# Patient Record
Sex: FEMALE | Race: OTHER | NOT HISPANIC OR LATINO | URBAN - METROPOLITAN AREA
[De-identification: names, ages, dates, MRNs, and addresses within clinical notes are randomized per-mention and may not be internally consistent; named-entity substitution may affect disease eponyms.]

---

## 2018-03-01 ENCOUNTER — OUTPATIENT (OUTPATIENT)
Dept: OUTPATIENT SERVICES | Facility: HOSPITAL | Age: 53
LOS: 1 days | End: 2018-03-01
Payer: MEDICAID

## 2018-03-07 ENCOUNTER — INPATIENT (INPATIENT)
Facility: HOSPITAL | Age: 53
LOS: 0 days | Discharge: ANOTHER HOSPITAL | End: 2018-03-08
Attending: INTERNAL MEDICINE | Admitting: INTERNAL MEDICINE
Payer: MEDICAID

## 2018-03-07 VITALS — WEIGHT: 149.91 LBS | HEIGHT: 65 IN

## 2018-03-07 DIAGNOSIS — I10 ESSENTIAL (PRIMARY) HYPERTENSION: ICD-10-CM

## 2018-03-07 DIAGNOSIS — I21.4 NON-ST ELEVATION (NSTEMI) MYOCARDIAL INFARCTION: ICD-10-CM

## 2018-03-07 DIAGNOSIS — E11.9 TYPE 2 DIABETES MELLITUS WITHOUT COMPLICATIONS: ICD-10-CM

## 2018-03-07 LAB
ADD ON TEST-SPECIMEN IN LAB: SIGNIFICANT CHANGE UP
ALBUMIN SERPL ELPH-MCNC: 3.1 G/DL — LOW (ref 3.3–5)
ALP SERPL-CCNC: 57 U/L — SIGNIFICANT CHANGE UP (ref 40–120)
ALT FLD-CCNC: 57 U/L — SIGNIFICANT CHANGE UP (ref 12–78)
ANION GAP SERPL CALC-SCNC: 6 MMOL/L — SIGNIFICANT CHANGE UP (ref 5–17)
APTT BLD: 33 SEC — SIGNIFICANT CHANGE UP (ref 27.5–37.4)
AST SERPL-CCNC: 53 U/L — HIGH (ref 15–37)
BASOPHILS NFR BLD AUTO: 0 % — SIGNIFICANT CHANGE UP (ref 0–2)
BILIRUB SERPL-MCNC: 0.3 MG/DL — SIGNIFICANT CHANGE UP (ref 0.2–1.2)
BUN SERPL-MCNC: 16 MG/DL — SIGNIFICANT CHANGE UP (ref 7–23)
CALCIUM SERPL-MCNC: 8.7 MG/DL — SIGNIFICANT CHANGE UP (ref 8.5–10.1)
CHLORIDE SERPL-SCNC: 104 MMOL/L — SIGNIFICANT CHANGE UP (ref 96–108)
CK SERPL-CCNC: 103 U/L — SIGNIFICANT CHANGE UP (ref 26–192)
CO2 SERPL-SCNC: 30 MMOL/L — SIGNIFICANT CHANGE UP (ref 22–31)
CREAT SERPL-MCNC: 0.73 MG/DL — SIGNIFICANT CHANGE UP (ref 0.5–1.3)
D DIMER BLD IA.RAPID-MCNC: <150 NG/ML DDU — SIGNIFICANT CHANGE UP
EOSINOPHIL NFR BLD AUTO: 3 % — SIGNIFICANT CHANGE UP (ref 0–6)
GLUCOSE SERPL-MCNC: 174 MG/DL — HIGH (ref 70–99)
HCT VFR BLD CALC: 35.6 % — SIGNIFICANT CHANGE UP (ref 34.5–45)
HGB BLD-MCNC: 11.3 G/DL — LOW (ref 11.5–15.5)
INR BLD: 0.99 RATIO — SIGNIFICANT CHANGE UP (ref 0.88–1.16)
LYMPHOCYTES # BLD AUTO: 4.98 K/UL — HIGH (ref 1–3.3)
LYMPHOCYTES # BLD AUTO: 45 % — HIGH (ref 13–44)
MANUAL SMEAR VERIFICATION: YES — SIGNIFICANT CHANGE UP
MCHC RBC-ENTMCNC: 28.2 PG — SIGNIFICANT CHANGE UP (ref 27–34)
MCHC RBC-ENTMCNC: 31.7 GM/DL — LOW (ref 32–36)
MCV RBC AUTO: 88.8 FL — SIGNIFICANT CHANGE UP (ref 80–100)
MONOCYTES NFR BLD AUTO: 3 % — SIGNIFICANT CHANGE UP (ref 2–14)
NEUTROPHILS # BLD AUTO: 4.76 K/UL — SIGNIFICANT CHANGE UP (ref 1.8–7.4)
NEUTROPHILS NFR BLD AUTO: 43 % — SIGNIFICANT CHANGE UP (ref 43–77)
NRBC # BLD: 0 /100 — SIGNIFICANT CHANGE UP (ref 0–0)
NRBC # BLD: SIGNIFICANT CHANGE UP /100 WBCS (ref 0–0)
NT-PROBNP SERPL-SCNC: 640 PG/ML — HIGH (ref 0–125)
PLAT MORPH BLD: NORMAL — SIGNIFICANT CHANGE UP
PLATELET # BLD AUTO: 240 K/UL — SIGNIFICANT CHANGE UP (ref 150–400)
POTASSIUM SERPL-MCNC: 4.1 MMOL/L — SIGNIFICANT CHANGE UP (ref 3.5–5.3)
POTASSIUM SERPL-SCNC: 4.1 MMOL/L — SIGNIFICANT CHANGE UP (ref 3.5–5.3)
PROT SERPL-MCNC: 7 GM/DL — SIGNIFICANT CHANGE UP (ref 6–8.3)
PROTHROM AB SERPL-ACNC: 10.7 SEC — SIGNIFICANT CHANGE UP (ref 9.8–12.7)
RBC # BLD: 4.01 M/UL — SIGNIFICANT CHANGE UP (ref 3.8–5.2)
RBC # FLD: 13.1 % — SIGNIFICANT CHANGE UP (ref 10.3–14.5)
RBC BLD AUTO: NORMAL — SIGNIFICANT CHANGE UP
SODIUM SERPL-SCNC: 140 MMOL/L — SIGNIFICANT CHANGE UP (ref 135–145)
TROPONIN I SERPL-MCNC: 0.45 NG/ML — HIGH (ref 0.01–0.04)
TSH SERPL-MCNC: 4.82 UIU/ML — HIGH (ref 0.36–3.74)
VARIANT LYMPHS # BLD: 6 % — SIGNIFICANT CHANGE UP (ref 0–6)
WBC # BLD: 11.06 K/UL — HIGH (ref 3.8–10.5)
WBC # FLD AUTO: 11.06 K/UL — HIGH (ref 3.8–10.5)

## 2018-03-07 PROCEDURE — 99223 1ST HOSP IP/OBS HIGH 75: CPT

## 2018-03-07 PROCEDURE — 93010 ELECTROCARDIOGRAM REPORT: CPT

## 2018-03-07 PROCEDURE — 99291 CRITICAL CARE FIRST HOUR: CPT

## 2018-03-07 PROCEDURE — 71046 X-RAY EXAM CHEST 2 VIEWS: CPT | Mod: 26

## 2018-03-07 RX ORDER — DEXTROSE 50 % IN WATER 50 %
12.5 SYRINGE (ML) INTRAVENOUS ONCE
Qty: 0 | Refills: 0 | Status: DISCONTINUED | OUTPATIENT
Start: 2018-03-07 | End: 2018-03-08

## 2018-03-07 RX ORDER — INSULIN LISPRO 100/ML
VIAL (ML) SUBCUTANEOUS EVERY 6 HOURS
Qty: 0 | Refills: 0 | Status: DISCONTINUED | OUTPATIENT
Start: 2018-03-07 | End: 2018-03-08

## 2018-03-07 RX ORDER — HEPARIN SODIUM 5000 [USP'U]/ML
4400 INJECTION INTRAVENOUS; SUBCUTANEOUS ONCE
Qty: 0 | Refills: 0 | Status: COMPLETED | OUTPATIENT
Start: 2018-03-07 | End: 2018-03-07

## 2018-03-07 RX ORDER — ASPIRIN/CALCIUM CARB/MAGNESIUM 324 MG
325 TABLET ORAL ONCE
Qty: 0 | Refills: 0 | Status: COMPLETED | OUTPATIENT
Start: 2018-03-07 | End: 2018-03-07

## 2018-03-07 RX ORDER — DEXTROSE 50 % IN WATER 50 %
1 SYRINGE (ML) INTRAVENOUS ONCE
Qty: 0 | Refills: 0 | Status: DISCONTINUED | OUTPATIENT
Start: 2018-03-07 | End: 2018-03-08

## 2018-03-07 RX ORDER — ONDANSETRON 8 MG/1
4 TABLET, FILM COATED ORAL EVERY 6 HOURS
Qty: 0 | Refills: 0 | Status: DISCONTINUED | OUTPATIENT
Start: 2018-03-07 | End: 2018-03-08

## 2018-03-07 RX ORDER — ASPIRIN/CALCIUM CARB/MAGNESIUM 324 MG
162 TABLET ORAL DAILY
Qty: 0 | Refills: 0 | Status: DISCONTINUED | OUTPATIENT
Start: 2018-03-07 | End: 2018-03-08

## 2018-03-07 RX ORDER — METOPROLOL TARTRATE 50 MG
5 TABLET ORAL ONCE
Qty: 0 | Refills: 0 | Status: COMPLETED | OUTPATIENT
Start: 2018-03-07 | End: 2018-03-07

## 2018-03-07 RX ORDER — GLUCAGON INJECTION, SOLUTION 0.5 MG/.1ML
1 INJECTION, SOLUTION SUBCUTANEOUS ONCE
Qty: 0 | Refills: 0 | Status: DISCONTINUED | OUTPATIENT
Start: 2018-03-07 | End: 2018-03-08

## 2018-03-07 RX ORDER — SODIUM CHLORIDE 9 MG/ML
3 INJECTION INTRAMUSCULAR; INTRAVENOUS; SUBCUTANEOUS ONCE
Qty: 0 | Refills: 0 | Status: COMPLETED | OUTPATIENT
Start: 2018-03-07 | End: 2018-03-07

## 2018-03-07 RX ORDER — METOPROLOL TARTRATE 50 MG
12.5 TABLET ORAL
Qty: 0 | Refills: 0 | Status: DISCONTINUED | OUTPATIENT
Start: 2018-03-07 | End: 2018-03-08

## 2018-03-07 RX ORDER — LEVOTHYROXINE SODIUM 125 MCG
25 TABLET ORAL DAILY
Qty: 0 | Refills: 0 | Status: DISCONTINUED | OUTPATIENT
Start: 2018-03-07 | End: 2018-03-08

## 2018-03-07 RX ORDER — AMLODIPINE BESYLATE 2.5 MG/1
5 TABLET ORAL DAILY
Qty: 0 | Refills: 0 | Status: DISCONTINUED | OUTPATIENT
Start: 2018-03-07 | End: 2018-03-08

## 2018-03-07 RX ORDER — SODIUM CHLORIDE 9 MG/ML
1000 INJECTION, SOLUTION INTRAVENOUS
Qty: 0 | Refills: 0 | Status: DISCONTINUED | OUTPATIENT
Start: 2018-03-07 | End: 2018-03-08

## 2018-03-07 RX ORDER — DEXTROSE 50 % IN WATER 50 %
25 SYRINGE (ML) INTRAVENOUS ONCE
Qty: 0 | Refills: 0 | Status: DISCONTINUED | OUTPATIENT
Start: 2018-03-07 | End: 2018-03-08

## 2018-03-07 RX ORDER — CLOPIDOGREL BISULFATE 75 MG/1
300 TABLET, FILM COATED ORAL ONCE
Qty: 0 | Refills: 0 | Status: COMPLETED | OUTPATIENT
Start: 2018-03-07 | End: 2018-03-07

## 2018-03-07 RX ORDER — HEPARIN SODIUM 5000 [USP'U]/ML
INJECTION INTRAVENOUS; SUBCUTANEOUS
Qty: 25000 | Refills: 0 | Status: DISCONTINUED | OUTPATIENT
Start: 2018-03-07 | End: 2018-03-08

## 2018-03-07 RX ORDER — HEPARIN SODIUM 5000 [USP'U]/ML
4400 INJECTION INTRAVENOUS; SUBCUTANEOUS EVERY 6 HOURS
Qty: 0 | Refills: 0 | Status: DISCONTINUED | OUTPATIENT
Start: 2018-03-07 | End: 2018-03-08

## 2018-03-07 RX ORDER — LISINOPRIL 2.5 MG/1
5 TABLET ORAL DAILY
Qty: 0 | Refills: 0 | Status: COMPLETED | OUTPATIENT
Start: 2018-03-07 | End: 2018-03-07

## 2018-03-07 RX ORDER — CLOPIDOGREL BISULFATE 75 MG/1
75 TABLET, FILM COATED ORAL DAILY
Qty: 0 | Refills: 0 | Status: DISCONTINUED | OUTPATIENT
Start: 2018-03-08 | End: 2018-03-08

## 2018-03-07 RX ORDER — ATORVASTATIN CALCIUM 80 MG/1
40 TABLET, FILM COATED ORAL AT BEDTIME
Qty: 0 | Refills: 0 | Status: DISCONTINUED | OUTPATIENT
Start: 2018-03-07 | End: 2018-03-08

## 2018-03-07 RX ORDER — SODIUM CHLORIDE 9 MG/ML
1000 INJECTION INTRAMUSCULAR; INTRAVENOUS; SUBCUTANEOUS
Qty: 0 | Refills: 0 | Status: DISCONTINUED | OUTPATIENT
Start: 2018-03-07 | End: 2018-03-08

## 2018-03-07 RX ADMIN — Medication 5 MILLIGRAM(S): at 22:30

## 2018-03-07 RX ADMIN — SODIUM CHLORIDE 3 MILLILITER(S): 9 INJECTION INTRAMUSCULAR; INTRAVENOUS; SUBCUTANEOUS at 21:00

## 2018-03-07 RX ADMIN — CLOPIDOGREL BISULFATE 300 MILLIGRAM(S): 75 TABLET, FILM COATED ORAL at 22:50

## 2018-03-07 RX ADMIN — HEPARIN SODIUM 900 UNIT(S)/HR: 5000 INJECTION INTRAVENOUS; SUBCUTANEOUS at 23:08

## 2018-03-07 RX ADMIN — HEPARIN SODIUM 4400 UNIT(S): 5000 INJECTION INTRAVENOUS; SUBCUTANEOUS at 23:08

## 2018-03-07 NOTE — ED ADULT NURSE NOTE - OBJECTIVE STATEMENT
Pt presents to ER c/o midsternal CP and SOB. Pt reports onset of symptoms was a few days ago with sudden onset. Denies radiating pain. Equal b/l chest expansion, clear lung sounds b/l, normal breathing pattern with no difficulty. AO x 3 oriented to baseline.

## 2018-03-07 NOTE — H&P ADULT - ASSESSMENT
53 y.o. female with PMH HTN, hypothyroidism and DM2 presents with chest pain and shortness of breath for the past few days. Pt reports sudden CP, substernal, moderate nonradiating that is associated with shortness of breath. Pt denies prior episodes. Pt states symptoms worse with exertion and asympt at rest. Denies fever, chills, n/v, abd pain, diarrhea or dysuria. Denies hx of cardiac issues. Pt is noncompliant with her medications. Denies stressors recently.    #chest pain due to NSTEMI (type II MI) 53 y.o. female with PMH HTN, hypothyroidism and DM2 presents with chest pain and shortness of breath for the past few days. Pt reports sudden CP, substernal, moderate nonradiating that is associated with shortness of breath. Pt denies prior episodes. Pt states symptoms worse with exertion and asympt at rest. Denies fever, chills, n/v, abd pain, diarrhea or dysuria. Denies hx of cardiac issues. Pt is noncompliant with her medications. Denies stressors recently.    #chest pain due to NSTEMI (type II MI)  -admit to CCU  -serial cardiac enzymes and EKGs  -ASA, plavix, statin  -BB, ACEI  -heparin drip  -iv hydration  -echo  -NPO except meds for cardiac cath in AM  -cardio consult, Dr Galeano    #hypothyroidism  -cont synthroid    #HTN, uncontrolled  -as above    #DM2  -hold metformin  -fingerstick monitoring and ISS    #DVT ppx  -on heparin drip

## 2018-03-07 NOTE — CONSULT NOTE ADULT - SUBJECTIVE AND OBJECTIVE BOX
HPI:  53 y.o. female with PMH HTN, hypothyroidism and DM2 presents with chest pain and shortness of breath for the past few days. Pt reports sub sternal chest pressure, moderate intensity, non- radiating that is associated with shortness of breath occuring a rest  today which prompted her come to hospital.  She has also gotten similair symptoms with less intensity with exertion over the past few days. Denies palpitations, orthopnea, PND , or LE edema. Also denies fever, chills, n/v, abd pain, diarrhea or dysuria. When she presented she was markedly hypertensive and she received medication which has helped her BP.     PAST MEDICAL & SURGICAL HISTORY:  Hypothyroid  DM (diabetes mellitus)  HTN (hypertension)    SOCIAL HISTORY: Non-Smoker/Social ETOH/ No Ilicit Drug use.    FAMILY HISTORY:       Allergies    No Known Allergies    Intolerances        HOME MEDICATIONS:   Home Medications:  aspirin 81 mg oral tablet: 1 tab(s) orally once a day (07 Mar 2018 22:02)  levothyroxine 25 mcg (0.025 mg) oral tablet: 1 tab(s) orally once a day (07 Mar 2018 22:02)  metFORMIN 500 mg oral tablet, extended release: 1 tab(s) orally 2 times a day (07 Mar 2018 22:02)  Norvasc 10 mg oral tablet: 1 tab(s) orally once a day (07 Mar 2018 22:02)      HOSPITAL MEDICATIONS:   MEDICATIONS  (STANDING):  aspirin enteric coated 162 milliGRAM(s) Oral daily  atorvastatin 40 milliGRAM(s) Oral at bedtime  clopidogrel Tablet 75 milliGRAM(s) Oral daily  clopidogrel Tablet 300 milliGRAM(s) Oral once  heparin  Infusion.  Unit(s)/Hr (9 mL/Hr) IV Continuous <Continuous>  heparin  Injectable 4400 Unit(s) IV Push once  lisinopril 5 milliGRAM(s) Oral daily  metoprolol     tartrate 12.5 milliGRAM(s) Oral two times a day  sodium chloride 0.9%. 1000 milliLiter(s) (75 mL/Hr) IV Continuous <Continuous>    MEDICATIONS  (PRN):  heparin  Injectable 4400 Unit(s) IV Push every 6 hours PRN For aPTT less than 40  ondansetron Injectable 4 milliGRAM(s) IV Push every 6 hours PRN Nausea      REVIEW OF SYSTEMS: 13 systems were reviewed and all negative except for comments above.    Vital Signs Last 24 Hrs  T(C): 36.7 (07 Mar 2018 19:49), Max: 36.7 (07 Mar 2018 19:49)  T(F): 98.1 (07 Mar 2018 19:49), Max: 98.1 (07 Mar 2018 19:49)  HR: 86 (07 Mar 2018 19:49) (86 - 86)  BP: 193/101 (07 Mar 2018 19:49) (193/101 - 193/101)  BP(mean): --  RR: 17 (07 Mar 2018 19:49) (17 - 17)  SpO2: 98% (07 Mar 2018 19:49) (98% - 98%)Daily Height in cm: 165.1 (07 Mar 2018 19:40)    Daily I&O's Summary      PHYSICAL EXAM:    Constitutional: NAD, awake and alert, well-developed  HEENT: PERRLA, EOMI,  No oral cyanosis. Oropharynx Clean and Dry.  Neck:  supple,  No JVD, No Thyroid enlargement. No Carotid Bruits bilaterally.  Respiratory: Breath sounds are clear bilaterally, No wheezing, rales or rhonchi  Cardiovascular: NL S1 and S2, RRR, no Murmurs, gallops or rubs  Gastrointestinal: Bowel Sounds present, soft, NT, ND, No HSM.   Extremities: No peripheral edema. No clubbing or cyanosis.  Barbeau Test performed in R+L UE and Negative.  Vascular: 2+ peripheral pulses in LE   Neurological: A/O x 3, no focal motor or sensory deficits  Musculoskeletal: no calf tenderness or joint swelling.  Skin: No rashes or lessions.      LABS: All Labs Reviewed:                        11.3   11.06 )-----------( 240      ( 07 Mar 2018 20:53 )             35.6     07 Mar 2018 20:53    140    |  104    |  16     ----------------------------<  174    4.1     |  30     |  0.73     Ca    8.7        07 Mar 2018 20:53    TPro  7.0    /  Alb  3.1    /  TBili  0.3    /  DBili  x      /  AST  53     /  ALT  57     /  AlkPhos  57     07 Mar 2018 20:53    PT/INR - ( 07 Mar 2018 20:53 )   PT: 10.7 sec;   INR: 0.99 ratio         PTT - ( 07 Mar 2018 20:53 )  PTT:33.0 sec  CARDIAC MARKERS ( 07 Mar 2018 20:53 )  0.448 ng/mL / x     / 103 U/L / x     / x          03-07 @ 20:53  Pro Bnp 640        RADIOLOGY:    EKG:    ECHO:    CARDIAC CATHTERIZATION/STRESS TEST: HPI:  53 y.o. female with PMH HTN, hypothyroidism and DM2 presents with chest pain and shortness of breath for the past few days. Pt reports sub sternal chest pressure, moderate intensity, non- radiating that is associated with shortness of breath occuring a rest  today which prompted her come to hospital.  She has also gotten similair symptoms with less intensity with exertion over the past few days. Denies palpitations, orthopnea, PND , or LE edema. Also denies fever, chills, n/v, abd pain, diarrhea or dysuria. When she presented she was markedly hypertensive and she received medication which has helped her BP.     PAST MEDICAL & SURGICAL HISTORY:  Hypothyroid  DM (diabetes mellitus)  HTN (hypertension)    SOCIAL HISTORY: Non-Smoker/Social ETOH/ No Ilicit Drug use.    FAMILY HISTORY:       Allergies    No Known Allergies    Intolerances        HOME MEDICATIONS:   Home Medications:  aspirin 81 mg oral tablet: 1 tab(s) orally once a day (07 Mar 2018 22:02)  levothyroxine 25 mcg (0.025 mg) oral tablet: 1 tab(s) orally once a day (07 Mar 2018 22:02)  metFORMIN 500 mg oral tablet, extended release: 1 tab(s) orally 2 times a day (07 Mar 2018 22:02)  Norvasc 10 mg oral tablet: 1 tab(s) orally once a day (07 Mar 2018 22:02)      HOSPITAL MEDICATIONS:   MEDICATIONS  (STANDING):  aspirin enteric coated 162 milliGRAM(s) Oral daily  atorvastatin 40 milliGRAM(s) Oral at bedtime  clopidogrel Tablet 75 milliGRAM(s) Oral daily  clopidogrel Tablet 300 milliGRAM(s) Oral once  heparin  Infusion.  Unit(s)/Hr (9 mL/Hr) IV Continuous <Continuous>  heparin  Injectable 4400 Unit(s) IV Push once  lisinopril 5 milliGRAM(s) Oral daily  metoprolol     tartrate 12.5 milliGRAM(s) Oral two times a day  sodium chloride 0.9%. 1000 milliLiter(s) (75 mL/Hr) IV Continuous <Continuous>    MEDICATIONS  (PRN):  heparin  Injectable 4400 Unit(s) IV Push every 6 hours PRN For aPTT less than 40  ondansetron Injectable 4 milliGRAM(s) IV Push every 6 hours PRN Nausea      REVIEW OF SYSTEMS: 13 systems were reviewed and all negative except for comments above.    Vital Signs Last 24 Hrs  T(C): 36.7 (07 Mar 2018 19:49), Max: 36.7 (07 Mar 2018 19:49)  T(F): 98.1 (07 Mar 2018 19:49), Max: 98.1 (07 Mar 2018 19:49)  HR: 86 (07 Mar 2018 19:49) (86 - 86)  BP: 193/101 (07 Mar 2018 19:49) (193/101 - 163/90)  BP(mean): --  RR: 17 (07 Mar 2018 19:49) (17 - 17)  SpO2: 98% (07 Mar 2018 19:49) (98% - 98%)Daily Height in cm: 165.1 (07 Mar 2018 19:40)    Daily I&O's Summary      PHYSICAL EXAM:    Constitutional: NAD, awake and alert, well-developed  HEENT: PERRLA, EOMI,  No oral cyanosis. Oropharynx Clean and Dry.  Neck:  supple,  No JVD, No Thyroid enlargement. No Carotid Bruits bilaterally.  Respiratory: Breath sounds are clear bilaterally, No wheezing, rales or rhonchi  Cardiovascular: NL S1 and S2, RRR, +s4. 1/6 ANGLE to LUSB  Gastrointestinal: Bowel Sounds present, soft, NT, ND.   Extremities: No peripheral edema. No clubbing or cyanosis.   Vascular: 2+ peripheral pulses in LE   Neurological: A/O x 3, no focal motor deficits  Musculoskeletal: no calf tenderness.  Skin: No rashes.      LABS: All Labs Reviewed:                        11.3   11.06 )-----------( 240      ( 07 Mar 2018 20:53 )             35.6     07 Mar 2018 20:53    140    |  104    |  16     ----------------------------<  174    4.1     |  30     |  0.73     Ca    8.7        07 Mar 2018 20:53    TPro  7.0    /  Alb  3.1    /  TBili  0.3    /  DBili  x      /  AST  53     /  ALT  57     /  AlkPhos  57     07 Mar 2018 20:53    PT/INR - ( 07 Mar 2018 20:53 )   PT: 10.7 sec;   INR: 0.99 ratio         PTT - ( 07 Mar 2018 20:53 )  PTT:33.0 sec  CARDIAC MARKERS ( 07 Mar 2018 20:53 )  0.448 ng/mL / x     / 103 U/L / x     / x          03-07 @ 20:53  Pro Bnp 640    RADIOLOGY:    EKG:  < from: 12 Lead ECG (03.07.18 @ 19:45) >  Normal sinus rhythmwith sinus arrhythmia  Possible Left atrial enlargement  ST & T wave abnormality, consider inferolateral ischemia  When compared with ECG of 25-SEP-2015 11:30,  T wave inversion now evident in Inferior leads  T wave inversion now evident in Anterolateral leads  < end of copied text >    ECHO:  2015: NL LV FX. Mild MR and Mild MVP. HPI:  53 y.o. female with PMH HTN, hypothyroidism and DM2 presents with chest pain and shortness of breath for the past few days. Pt reports sub sternal chest pressure, moderate intensity, non- radiating that is associated with shortness of breath occuring a rest  today which prompted her come to hospital.  She has also gotten similair symptoms with less intensity with exertion over the past few days. Denies palpitations, orthopnea, PND , or LE edema. Also denies fever, chills, n/v, abd pain, diarrhea or dysuria. When she presented she was markedly hypertensive and she received medication which has helped her BP.     PAST MEDICAL & SURGICAL HISTORY:  Hypothyroid  DM (diabetes mellitus)  HTN (hypertension)    SOCIAL HISTORY: Non-Smoker/Social ETOH/ No Ilicit Drug use.    FAMILY HISTORY: Mom and brothers have htn. Dad  of heart attack at age 59.    Allergies    No Known Allergies    Intolerances        HOME MEDICATIONS:   Home Medications:  aspirin 81 mg oral tablet: 1 tab(s) orally once a day (07 Mar 2018 22:02)  levothyroxine 25 mcg (0.025 mg) oral tablet: 1 tab(s) orally once a day (07 Mar 2018 22:02)  metFORMIN 500 mg oral tablet, extended release: 1 tab(s) orally 2 times a day (07 Mar 2018 22:02)  Norvasc 10 mg oral tablet: 1 tab(s) orally once a day (07 Mar 2018 22:02)      HOSPITAL MEDICATIONS:   MEDICATIONS  (STANDING):  aspirin enteric coated 162 milliGRAM(s) Oral daily  atorvastatin 40 milliGRAM(s) Oral at bedtime  clopidogrel Tablet 75 milliGRAM(s) Oral daily  clopidogrel Tablet 300 milliGRAM(s) Oral once  heparin  Infusion.  Unit(s)/Hr (9 mL/Hr) IV Continuous <Continuous>  heparin  Injectable 4400 Unit(s) IV Push once  lisinopril 5 milliGRAM(s) Oral daily  metoprolol     tartrate 12.5 milliGRAM(s) Oral two times a day  sodium chloride 0.9%. 1000 milliLiter(s) (75 mL/Hr) IV Continuous <Continuous>    MEDICATIONS  (PRN):  heparin  Injectable 4400 Unit(s) IV Push every 6 hours PRN For aPTT less than 40  ondansetron Injectable 4 milliGRAM(s) IV Push every 6 hours PRN Nausea      REVIEW OF SYSTEMS: 13 systems were reviewed and all negative except for comments above.    Vital Signs Last 24 Hrs  T(C): 36.7 (07 Mar 2018 19:49), Max: 36.7 (07 Mar 2018 19:49)  T(F): 98.1 (07 Mar 2018 19:49), Max: 98.1 (07 Mar 2018 19:49)  HR: 86 (07 Mar 2018 19:49) (86 - 86)  BP: 193/101 (07 Mar 2018 19:49) (193/101 - 163/90)  BP(mean): --  RR: 17 (07 Mar 2018 19:49) (17 - 17)  SpO2: 98% (07 Mar 2018 19:49) (98% - 98%)Daily Height in cm: 165.1 (07 Mar 2018 19:40)    Daily I&O's Summary      PHYSICAL EXAM:    Constitutional: NAD, awake and alert, well-developed  HEENT: PERRLA, EOMI,  No oral cyanosis. Oropharynx Clean and Dry.  Neck:  supple,  No JVD, No Thyroid enlargement. No Carotid Bruits bilaterally.  Respiratory: Breath sounds are clear bilaterally, No wheezing, rales or rhonchi  Cardiovascular: NL S1 and S2, RRR, +s4. 1/6 ANGLE to LUSB  Gastrointestinal: Bowel Sounds present, soft, NT, ND.   Extremities: No peripheral edema. No clubbing or cyanosis.   Vascular: 2+ peripheral pulses in LE   Neurological: A/O x 3, no focal motor deficits  Musculoskeletal: no calf tenderness.  Skin: No rashes.      LABS: All Labs Reviewed:                        11.3   11.06 )-----------( 240      ( 07 Mar 2018 20:53 )             35.6     07 Mar 2018 20:53    140    |  104    |  16     ----------------------------<  174    4.1     |  30     |  0.73     Ca    8.7        07 Mar 2018 20:53    TPro  7.0    /  Alb  3.1    /  TBili  0.3    /  DBili  x      /  AST  53     /  ALT  57     /  AlkPhos  57     07 Mar 2018 20:53    PT/INR - ( 07 Mar 2018 20:53 )   PT: 10.7 sec;   INR: 0.99 ratio         PTT - ( 07 Mar 2018 20:53 )  PTT:33.0 sec  CARDIAC MARKERS ( 07 Mar 2018 20:53 )  0.448 ng/mL / x     / 103 U/L / x     / x          - @ 20:53  Pro Bnp 640    RADIOLOGY:    EKG:  < from: 12 Lead ECG (18 @ 19:45) >  Normal sinus rhythmwith sinus arrhythmia  Possible Left atrial enlargement  ST & T wave abnormality, consider inferolateral ischemia  When compared with ECG of 25-SEP-2015 11:30,  T wave inversion now evident in Inferior leads  T wave inversion now evident in Anterolateral leads  < end of copied text >    ECHO:  2015: NL LV FX. Mild MR and Mild MVP.

## 2018-03-07 NOTE — ED ADULT TRIAGE NOTE - CHIEF COMPLAINT QUOTE
Patient presents to ED complaining of difficulty breathing for one week. Patient denies chest pain. Patient reports history of pneumonia.

## 2018-03-07 NOTE — H&P ADULT - HISTORY OF PRESENT ILLNESS
53 y.o. female with PMH HTN, hypothyroidism and DM2 presents with chest pain and shortness of breath for the past few days. Pt reports sudden CP, substernal, moderate nonradiating that is associated with shortness of breath. Pt denies prior episodes. Pt states symptoms worse with exertion and asympt at rest. Denies fever, chills, n/v, abd pain, diarrhea or dysuria. Denies hx of cardiac issues. Pt is noncompliant with her medications. 53 y.o. female with PMH HTN, hypothyroidism and DM2 presents with chest pain and shortness of breath for the past few days. Pt reports sudden CP, substernal, moderate nonradiating that is associated with shortness of breath. Pt denies prior episodes. Pt states symptoms worse with exertion and asympt at rest. Denies fever, chills, n/v, abd pain, diarrhea or dysuria. Denies hx of cardiac issues. Pt is noncompliant with her medications. Denies stressors recently.    PMH: as above  PSH: denies  Social Hx: denies tobacco, EtOH, drugs  Family Hx: Mother and Father with diabetes  ROS: per HPI

## 2018-03-07 NOTE — ED STATDOCS - PROGRESS NOTE DETAILS
Sharyn JUSTIN for ED attending, Dr. Love: 54 y/o female with a PMHx of DM on Metformin, hypothyroid on Synthroid presents to the ED c/o trouble breathing for a few days on minimal exertion. No h/o heart problems. On ASA. PMD: Anna Thacker. Will send pt to the main ED for further evaluation.

## 2018-03-07 NOTE — ED PROVIDER NOTE - PROGRESS NOTE DETAILS
Roma MEDINA: Case discussed with Dr. Galeano- cardiology on call- would like patient placed in CCU; heparing gtt/bolus; plavix 300mg and lopressor at this time. Dr. Howard aware. Roma MEDINA: (+) troponin- NSTEMI; cardiology paged; endorsed to Dr. Howard for admission.

## 2018-03-07 NOTE — H&P ADULT - NSHPPHYSICALEXAM_GEN_ALL_CORE
Vital Signs Last 24 Hrs  T(C): 36.7 (07 Mar 2018 19:49), Max: 36.7 (07 Mar 2018 19:49)  T(F): 98.1 (07 Mar 2018 19:49), Max: 98.1 (07 Mar 2018 19:49)  HR: 86 (07 Mar 2018 19:49) (86 - 86)  BP: 193/101 (07 Mar 2018 19:49) (193/101 - 193/101)  BP(mean): --  RR: 17 (07 Mar 2018 19:49) (17 - 17)  SpO2: 98% (07 Mar 2018 19:49) (98% - 98%)    GEN: appears comfortable  Neuro: AAOx3, nonfocal  HEENT: NC/AT, EOMI  Neck: no thyroidmegaly, no JVD  Cardiovascular: S1S2 present, regular rhythm, no murmur  Respiratory: breath sounds normal bilaterally, no wheezing, no rales, no rhonchi  Gastrointestinal: bowel sounds normal, soft, no abdominal tenderness  Musculoskeletal: no muscle tenderness  Extremities: No edema  Skin: No rash

## 2018-03-07 NOTE — ED PROVIDER NOTE - OBJECTIVE STATEMENT
54 y/o female with a PMHx of HTN on medication, DM on Metformin, hypothyroid on Synthroid presents to the ED c/o intermittent difficulty breathing, CP x4-5 days. Pt states she has difficulty breathing, SOB with walking a few steps. +mild cough. No fever, leg swelling, recent travel. No h/o heart problems. On ASA 81mg. Pt states she last saw PMD 6 months ago and missed her last 2 appointments. No ETOH use. Non-smoker. No illicit drug use. No family cardiac Hx. PMD: Dr. Anna Brock. 52 y/o female with a PMHx of HTN on medication, DM on Metformin, hypothyroidism on Synthroid presents to the ED c/o intermittent difficulty breathing, CP x4-5 days. Pt states she has difficulty breathing, SOB with walking a few steps. +mild cough. No fever, leg swelling, recent travel. No h/o heart problems. On ASA 81mg. Pt states she last saw PMD 6 months ago and missed her last 2 appointments. No ETOH use. Non-smoker. No illicit drug use. No family cardiac Hx. PMD: Dr. Anna Brock.

## 2018-03-07 NOTE — CONSULT NOTE ADULT - PROBLEM SELECTOR RECOMMENDATION 9
Crescendo angina with NSTEMI.  Aspirin and plavix started. heparin drip. trend CE and repeat ECG in AM. Cardiac cath in AM for further evaluation and treatment. Echocardiogram for assessment of LV FX.

## 2018-03-08 ENCOUNTER — TRANSCRIPTION ENCOUNTER (OUTPATIENT)
Age: 53
End: 2018-03-08

## 2018-03-08 ENCOUNTER — INPATIENT (INPATIENT)
Facility: HOSPITAL | Age: 53
LOS: 8 days | Discharge: HOME | DRG: 235 | End: 2018-03-17
Attending: STUDENT IN AN ORGANIZED HEALTH CARE EDUCATION/TRAINING PROGRAM | Admitting: THORACIC SURGERY (CARDIOTHORACIC VASCULAR SURGERY)
Payer: MEDICAID

## 2018-03-08 VITALS — HEART RATE: 97 BPM | RESPIRATION RATE: 22 BRPM | OXYGEN SATURATION: 97 %

## 2018-03-08 VITALS
OXYGEN SATURATION: 97 % | SYSTOLIC BLOOD PRESSURE: 160 MMHG | TEMPERATURE: 98 F | WEIGHT: 159.39 LBS | RESPIRATION RATE: 25 BRPM | HEIGHT: 64 IN | DIASTOLIC BLOOD PRESSURE: 86 MMHG | HEART RATE: 88 BPM

## 2018-03-08 DIAGNOSIS — I25.10 ATHEROSCLEROTIC HEART DISEASE OF NATIVE CORONARY ARTERY WITHOUT ANGINA PECTORIS: ICD-10-CM

## 2018-03-08 DIAGNOSIS — I25.110 ATHEROSCLEROTIC HEART DISEASE OF NATIVE CORONARY ARTERY WITH UNSTABLE ANGINA PECTORIS: ICD-10-CM

## 2018-03-08 LAB
ALBUMIN SERPL ELPH-MCNC: 3.5 G/DL — SIGNIFICANT CHANGE UP (ref 3.3–5)
ALP SERPL-CCNC: 54 U/L — SIGNIFICANT CHANGE UP (ref 40–120)
ALT FLD-CCNC: 47 U/L RC — HIGH (ref 10–45)
ANION GAP SERPL CALC-SCNC: 13 MMOL/L — SIGNIFICANT CHANGE UP (ref 5–17)
ANION GAP SERPL CALC-SCNC: 6 MMOL/L — SIGNIFICANT CHANGE UP (ref 5–17)
APPEARANCE UR: CLEAR — SIGNIFICANT CHANGE UP
APTT BLD: 39.5 SEC — HIGH (ref 27.5–37.4)
APTT BLD: 59.9 SEC — HIGH (ref 27.5–37.4)
AST SERPL-CCNC: 55 U/L — HIGH (ref 10–40)
BASOPHILS # BLD AUTO: 0.05 K/UL — SIGNIFICANT CHANGE UP (ref 0–0.2)
BASOPHILS NFR BLD AUTO: 0.4 % — SIGNIFICANT CHANGE UP (ref 0–2)
BILIRUB SERPL-MCNC: 0.4 MG/DL — SIGNIFICANT CHANGE UP (ref 0.2–1.2)
BILIRUB UR-MCNC: NEGATIVE — SIGNIFICANT CHANGE UP
BLD GP AB SCN SERPL QL: NEGATIVE — SIGNIFICANT CHANGE UP
BUN SERPL-MCNC: 12 MG/DL — SIGNIFICANT CHANGE UP (ref 7–23)
BUN SERPL-MCNC: 8 MG/DL — SIGNIFICANT CHANGE UP (ref 7–23)
CALCIUM SERPL-MCNC: 8.6 MG/DL — SIGNIFICANT CHANGE UP (ref 8.5–10.1)
CALCIUM SERPL-MCNC: 9 MG/DL — SIGNIFICANT CHANGE UP (ref 8.4–10.5)
CHLORIDE SERPL-SCNC: 102 MMOL/L — SIGNIFICANT CHANGE UP (ref 96–108)
CHLORIDE SERPL-SCNC: 104 MMOL/L — SIGNIFICANT CHANGE UP (ref 96–108)
CHOLEST SERPL-MCNC: 214 MG/DL — HIGH (ref 10–199)
CK SERPL-CCNC: 85 U/L — SIGNIFICANT CHANGE UP (ref 26–192)
CO2 SERPL-SCNC: 26 MMOL/L — SIGNIFICANT CHANGE UP (ref 22–31)
CO2 SERPL-SCNC: 29 MMOL/L — SIGNIFICANT CHANGE UP (ref 22–31)
COLOR SPEC: YELLOW — SIGNIFICANT CHANGE UP
CREAT SERPL-MCNC: 0.6 MG/DL — SIGNIFICANT CHANGE UP (ref 0.5–1.3)
CREAT SERPL-MCNC: 0.66 MG/DL — SIGNIFICANT CHANGE UP (ref 0.5–1.3)
DIFF PNL FLD: ABNORMAL
EOSINOPHIL # BLD AUTO: 0.29 K/UL — SIGNIFICANT CHANGE UP (ref 0–0.5)
EOSINOPHIL NFR BLD AUTO: 2.5 % — SIGNIFICANT CHANGE UP (ref 0–6)
GLUCOSE BLDC GLUCOMTR-MCNC: 147 MG/DL — HIGH (ref 70–99)
GLUCOSE BLDC GLUCOMTR-MCNC: 154 MG/DL — HIGH (ref 70–99)
GLUCOSE BLDC GLUCOMTR-MCNC: 175 MG/DL — HIGH (ref 70–99)
GLUCOSE BLDC GLUCOMTR-MCNC: 220 MG/DL — HIGH (ref 70–99)
GLUCOSE SERPL-MCNC: 140 MG/DL — HIGH (ref 70–99)
GLUCOSE SERPL-MCNC: 176 MG/DL — HIGH (ref 70–99)
GLUCOSE UR QL: 50 MG/DL
HBA1C BLD-MCNC: 7.5 % — HIGH (ref 4–5.6)
HCT VFR BLD CALC: 33.2 % — LOW (ref 34.5–45)
HCT VFR BLD CALC: 34.2 % — LOW (ref 34.5–45)
HDLC SERPL-MCNC: 29 MG/DL — LOW (ref 40–125)
HGB BLD-MCNC: 10.5 G/DL — LOW (ref 11.5–15.5)
HGB BLD-MCNC: 12 G/DL — SIGNIFICANT CHANGE UP (ref 11.5–15.5)
IMM GRANULOCYTES NFR BLD AUTO: 0.2 % — SIGNIFICANT CHANGE UP (ref 0–1.5)
INR BLD: 1 RATIO — SIGNIFICANT CHANGE UP (ref 0.88–1.16)
KETONES UR-MCNC: NEGATIVE — SIGNIFICANT CHANGE UP
LEUKOCYTE ESTERASE UR-ACNC: NEGATIVE — SIGNIFICANT CHANGE UP
LIPID PNL WITH DIRECT LDL SERPL: 115 MG/DL — SIGNIFICANT CHANGE UP
LYMPHOCYTES # BLD AUTO: 55.1 % — HIGH (ref 13–44)
LYMPHOCYTES # BLD AUTO: 6.4 K/UL — HIGH (ref 1–3.3)
MAGNESIUM SERPL-MCNC: 1.9 MG/DL — SIGNIFICANT CHANGE UP (ref 1.6–2.6)
MCHC RBC-ENTMCNC: 28 PG — SIGNIFICANT CHANGE UP (ref 27–34)
MCHC RBC-ENTMCNC: 30.7 PG — SIGNIFICANT CHANGE UP (ref 27–34)
MCHC RBC-ENTMCNC: 31.6 GM/DL — LOW (ref 32–36)
MCHC RBC-ENTMCNC: 35 GM/DL — SIGNIFICANT CHANGE UP (ref 32–36)
MCV RBC AUTO: 87.7 FL — SIGNIFICANT CHANGE UP (ref 80–100)
MCV RBC AUTO: 88.5 FL — SIGNIFICANT CHANGE UP (ref 80–100)
MONOCYTES # BLD AUTO: 0.58 K/UL — SIGNIFICANT CHANGE UP (ref 0–0.9)
MONOCYTES NFR BLD AUTO: 5 % — SIGNIFICANT CHANGE UP (ref 2–14)
NEUTROPHILS # BLD AUTO: 4.27 K/UL — SIGNIFICANT CHANGE UP (ref 1.8–7.4)
NEUTROPHILS NFR BLD AUTO: 36.8 % — LOW (ref 43–77)
NITRITE UR-MCNC: NEGATIVE — SIGNIFICANT CHANGE UP
NRBC # BLD: 0 /100 WBCS — SIGNIFICANT CHANGE UP (ref 0–0)
NT-PROBNP SERPL-SCNC: 614 PG/ML — HIGH (ref 0–300)
PA ADP PRP-ACNC: 277 PRU — SIGNIFICANT CHANGE UP (ref 194–417)
PH UR: 6.5 — SIGNIFICANT CHANGE UP (ref 5–8)
PHOSPHATE SERPL-MCNC: 3.5 MG/DL — SIGNIFICANT CHANGE UP (ref 2.5–4.5)
PLATELET # BLD AUTO: 213 K/UL — SIGNIFICANT CHANGE UP (ref 150–400)
PLATELET # BLD AUTO: 233 K/UL — SIGNIFICANT CHANGE UP (ref 150–400)
POTASSIUM SERPL-MCNC: 3.8 MMOL/L — SIGNIFICANT CHANGE UP (ref 3.5–5.3)
POTASSIUM SERPL-MCNC: 3.8 MMOL/L — SIGNIFICANT CHANGE UP (ref 3.5–5.3)
POTASSIUM SERPL-SCNC: 3.8 MMOL/L — SIGNIFICANT CHANGE UP (ref 3.5–5.3)
POTASSIUM SERPL-SCNC: 3.8 MMOL/L — SIGNIFICANT CHANGE UP (ref 3.5–5.3)
PROT SERPL-MCNC: 7.2 G/DL — SIGNIFICANT CHANGE UP (ref 6–8.3)
PROT UR-MCNC: 150 MG/DL
PROTHROM AB SERPL-ACNC: 10.9 SEC — SIGNIFICANT CHANGE UP (ref 9.8–12.7)
RBC # BLD: 3.75 M/UL — LOW (ref 3.8–5.2)
RBC # BLD: 3.9 M/UL — SIGNIFICANT CHANGE UP (ref 3.8–5.2)
RBC # FLD: 12.6 % — SIGNIFICANT CHANGE UP (ref 10.3–14.5)
RBC # FLD: 13.2 % — SIGNIFICANT CHANGE UP (ref 10.3–14.5)
RH IG SCN BLD-IMP: POSITIVE — SIGNIFICANT CHANGE UP
SODIUM SERPL-SCNC: 139 MMOL/L — SIGNIFICANT CHANGE UP (ref 135–145)
SODIUM SERPL-SCNC: 140 MMOL/L — SIGNIFICANT CHANGE UP (ref 135–145)
SP GR SPEC: 1.02 — SIGNIFICANT CHANGE UP (ref 1.01–1.02)
TOTAL CHOLESTEROL/HDL RATIO MEASUREMENT: 7.4 RATIO — HIGH (ref 3.3–7.1)
TRIGL SERPL-MCNC: 351 MG/DL — HIGH (ref 10–149)
TROPONIN I SERPL-MCNC: 0.29 NG/ML — HIGH (ref 0.01–0.04)
TROPONIN I SERPL-MCNC: 0.56 NG/ML — HIGH (ref 0.01–0.04)
TROPONIN I SERPL-MCNC: 0.63 NG/ML — HIGH (ref 0.01–0.04)
TSH SERPL-MCNC: 4.52 UIU/ML — HIGH (ref 0.27–4.2)
UROBILINOGEN FLD QL: NEGATIVE — SIGNIFICANT CHANGE UP
WBC # BLD: 11.61 K/UL — HIGH (ref 3.8–10.5)
WBC # BLD: 12.2 K/UL — HIGH (ref 3.8–10.5)
WBC # FLD AUTO: 11.61 K/UL — HIGH (ref 3.8–10.5)
WBC # FLD AUTO: 12.2 K/UL — HIGH (ref 3.8–10.5)

## 2018-03-08 PROCEDURE — 93458 L HRT ARTERY/VENTRICLE ANGIO: CPT | Mod: 26

## 2018-03-08 PROCEDURE — 93306 TTE W/DOPPLER COMPLETE: CPT | Mod: 26

## 2018-03-08 PROCEDURE — 93010 ELECTROCARDIOGRAM REPORT: CPT | Mod: 76

## 2018-03-08 PROCEDURE — 93880 EXTRACRANIAL BILAT STUDY: CPT | Mod: 26

## 2018-03-08 PROCEDURE — 93010 ELECTROCARDIOGRAM REPORT: CPT

## 2018-03-08 PROCEDURE — 99233 SBSQ HOSP IP/OBS HIGH 50: CPT

## 2018-03-08 RX ORDER — INSULIN HUMAN 100 [IU]/ML
INJECTION, SOLUTION SUBCUTANEOUS
Qty: 0 | Refills: 0 | Status: DISCONTINUED | OUTPATIENT
Start: 2018-03-08 | End: 2018-03-08

## 2018-03-08 RX ORDER — CHLORHEXIDINE GLUCONATE 213 G/1000ML
15 SOLUTION TOPICAL
Qty: 0 | Refills: 0 | Status: COMPLETED | OUTPATIENT
Start: 2018-03-08 | End: 2018-03-10

## 2018-03-08 RX ORDER — METOPROLOL TARTRATE 50 MG
1 TABLET ORAL
Qty: 0 | Refills: 0 | COMMUNITY
Start: 2018-03-08

## 2018-03-08 RX ORDER — HEPARIN SODIUM 5000 [USP'U]/ML
900 INJECTION INTRAVENOUS; SUBCUTANEOUS
Qty: 25000 | Refills: 0 | Status: DISCONTINUED | OUTPATIENT
Start: 2018-03-08 | End: 2018-03-11

## 2018-03-08 RX ORDER — LABETALOL HCL 100 MG
20 TABLET ORAL ONCE
Qty: 0 | Refills: 0 | Status: COMPLETED | OUTPATIENT
Start: 2018-03-08 | End: 2018-03-08

## 2018-03-08 RX ORDER — CEFUROXIME AXETIL 250 MG
1500 TABLET ORAL ONCE
Qty: 0 | Refills: 0 | Status: DISCONTINUED | OUTPATIENT
Start: 2018-03-08 | End: 2018-03-10

## 2018-03-08 RX ORDER — SODIUM CHLORIDE 9 MG/ML
1000 INJECTION, SOLUTION INTRAVENOUS
Qty: 0 | Refills: 0 | Status: DISCONTINUED | OUTPATIENT
Start: 2018-03-08 | End: 2018-03-11

## 2018-03-08 RX ORDER — HEPARIN SODIUM 5000 [USP'U]/ML
4400 INJECTION INTRAVENOUS; SUBCUTANEOUS EVERY 6 HOURS
Qty: 0 | Refills: 0 | Status: DISCONTINUED | OUTPATIENT
Start: 2018-03-08 | End: 2018-03-08

## 2018-03-08 RX ORDER — ASPIRIN/CALCIUM CARB/MAGNESIUM 324 MG
81 TABLET ORAL DAILY
Qty: 0 | Refills: 0 | Status: DISCONTINUED | OUTPATIENT
Start: 2018-03-08 | End: 2018-03-11

## 2018-03-08 RX ORDER — HEPARIN SODIUM 5000 [USP'U]/ML
INJECTION INTRAVENOUS; SUBCUTANEOUS
Qty: 25000 | Refills: 0 | Status: DISCONTINUED | OUTPATIENT
Start: 2018-03-08 | End: 2018-03-08

## 2018-03-08 RX ORDER — ATORVASTATIN CALCIUM 80 MG/1
1 TABLET, FILM COATED ORAL
Qty: 0 | Refills: 0 | COMMUNITY
Start: 2018-03-08

## 2018-03-08 RX ORDER — LEVOTHYROXINE SODIUM 125 MCG
25 TABLET ORAL DAILY
Qty: 0 | Refills: 0 | Status: DISCONTINUED | OUTPATIENT
Start: 2018-03-08 | End: 2018-03-11

## 2018-03-08 RX ORDER — METOPROLOL TARTRATE 50 MG
25 TABLET ORAL
Qty: 0 | Refills: 0 | Status: DISCONTINUED | OUTPATIENT
Start: 2018-03-08 | End: 2018-03-08

## 2018-03-08 RX ORDER — METFORMIN HYDROCHLORIDE 850 MG/1
1 TABLET ORAL
Qty: 0 | Refills: 0 | COMMUNITY

## 2018-03-08 RX ORDER — AMLODIPINE BESYLATE 2.5 MG/1
10 TABLET ORAL DAILY
Qty: 0 | Refills: 0 | Status: DISCONTINUED | OUTPATIENT
Start: 2018-03-08 | End: 2018-03-11

## 2018-03-08 RX ORDER — METOPROLOL TARTRATE 50 MG
5 TABLET ORAL ONCE
Qty: 0 | Refills: 0 | Status: COMPLETED | OUTPATIENT
Start: 2018-03-08 | End: 2018-03-08

## 2018-03-08 RX ORDER — CHLORHEXIDINE GLUCONATE 213 G/1000ML
1 SOLUTION TOPICAL DAILY
Qty: 0 | Refills: 0 | Status: DISCONTINUED | OUTPATIENT
Start: 2018-03-08 | End: 2018-03-10

## 2018-03-08 RX ORDER — METOPROLOL TARTRATE 50 MG
25 TABLET ORAL EVERY 8 HOURS
Qty: 0 | Refills: 0 | Status: DISCONTINUED | OUTPATIENT
Start: 2018-03-08 | End: 2018-03-09

## 2018-03-08 RX ORDER — INSULIN HUMAN 100 [IU]/ML
3 INJECTION, SOLUTION SUBCUTANEOUS
Qty: 100 | Refills: 0 | Status: DISCONTINUED | OUTPATIENT
Start: 2018-03-08 | End: 2018-03-11

## 2018-03-08 RX ORDER — ATORVASTATIN CALCIUM 80 MG/1
40 TABLET, FILM COATED ORAL AT BEDTIME
Qty: 0 | Refills: 0 | Status: DISCONTINUED | OUTPATIENT
Start: 2018-03-08 | End: 2018-03-11

## 2018-03-08 RX ORDER — ASPIRIN/CALCIUM CARB/MAGNESIUM 324 MG
2 TABLET ORAL
Qty: 0 | Refills: 0 | COMMUNITY
Start: 2018-03-08

## 2018-03-08 RX ORDER — INSULIN HUMAN 100 [IU]/ML
8 INJECTION, SOLUTION SUBCUTANEOUS ONCE
Qty: 0 | Refills: 0 | Status: COMPLETED | OUTPATIENT
Start: 2018-03-08 | End: 2018-03-08

## 2018-03-08 RX ORDER — INSULIN LISPRO 100/ML
VIAL (ML) SUBCUTANEOUS
Qty: 0 | Refills: 0 | Status: DISCONTINUED | OUTPATIENT
Start: 2018-03-08 | End: 2018-03-08

## 2018-03-08 RX ORDER — CLOPIDOGREL BISULFATE 75 MG/1
1 TABLET, FILM COATED ORAL
Qty: 0 | Refills: 0 | COMMUNITY
Start: 2018-03-08

## 2018-03-08 RX ORDER — ASPIRIN/CALCIUM CARB/MAGNESIUM 324 MG
1 TABLET ORAL
Qty: 0 | Refills: 0 | COMMUNITY

## 2018-03-08 RX ADMIN — AMLODIPINE BESYLATE 10 MILLIGRAM(S): 2.5 TABLET ORAL at 21:06

## 2018-03-08 RX ADMIN — INSULIN HUMAN 2 UNIT(S)/HR: 100 INJECTION, SOLUTION SUBCUTANEOUS at 23:19

## 2018-03-08 RX ADMIN — CLOPIDOGREL BISULFATE 75 MILLIGRAM(S): 75 TABLET, FILM COATED ORAL at 07:24

## 2018-03-08 RX ADMIN — ATORVASTATIN CALCIUM 40 MILLIGRAM(S): 80 TABLET, FILM COATED ORAL at 21:56

## 2018-03-08 RX ADMIN — Medication 20 MILLIGRAM(S): at 10:10

## 2018-03-08 RX ADMIN — Medication 12.5 MILLIGRAM(S): at 05:19

## 2018-03-08 RX ADMIN — INSULIN HUMAN 8 UNIT(S): 100 INJECTION, SOLUTION SUBCUTANEOUS at 22:56

## 2018-03-08 RX ADMIN — AMLODIPINE BESYLATE 5 MILLIGRAM(S): 2.5 TABLET ORAL at 05:19

## 2018-03-08 RX ADMIN — Medication 25 MILLIGRAM(S): at 11:24

## 2018-03-08 RX ADMIN — SODIUM CHLORIDE 15 MILLILITER(S): 9 INJECTION, SOLUTION INTRAVENOUS at 23:19

## 2018-03-08 RX ADMIN — SODIUM CHLORIDE 75 MILLILITER(S): 9 INJECTION INTRAMUSCULAR; INTRAVENOUS; SUBCUTANEOUS at 01:49

## 2018-03-08 RX ADMIN — HEPARIN SODIUM 900 UNIT(S)/HR: 5000 INJECTION INTRAVENOUS; SUBCUTANEOUS at 15:06

## 2018-03-08 RX ADMIN — Medication 81 MILLIGRAM(S): at 21:06

## 2018-03-08 RX ADMIN — CHLORHEXIDINE GLUCONATE 15 MILLILITER(S): 213 SOLUTION TOPICAL at 22:55

## 2018-03-08 RX ADMIN — Medication 25 MILLIGRAM(S): at 21:06

## 2018-03-08 RX ADMIN — Medication 25 MILLIGRAM(S): at 21:56

## 2018-03-08 RX ADMIN — Medication 5 MILLIGRAM(S): at 21:56

## 2018-03-08 RX ADMIN — ATORVASTATIN CALCIUM 40 MILLIGRAM(S): 80 TABLET, FILM COATED ORAL at 00:46

## 2018-03-08 RX ADMIN — HEPARIN SODIUM 900 UNIT(S)/HR: 5000 INJECTION INTRAVENOUS; SUBCUTANEOUS at 06:42

## 2018-03-08 RX ADMIN — Medication 25 MICROGRAM(S): at 05:19

## 2018-03-08 RX ADMIN — Medication 25 MICROGRAM(S): at 21:06

## 2018-03-08 RX ADMIN — Medication 162 MILLIGRAM(S): at 07:24

## 2018-03-08 RX ADMIN — LISINOPRIL 5 MILLIGRAM(S): 2.5 TABLET ORAL at 00:46

## 2018-03-08 RX ADMIN — Medication 1: at 06:46

## 2018-03-08 NOTE — PROGRESS NOTE ADULT - PROBLEM SELECTOR PLAN 1
Cath shows 3 vessel CAD and mild impaired LV FX.  Will need CABG.  Txf to [facility] for Dr. Grant.  Restart heparin 6 hrs post sheath removal. Increase metoprolol. follow up echo results.

## 2018-03-08 NOTE — DISCHARGE NOTE ADULT - CARE PLAN
Principal Discharge DX:	NSTEMI (non-ST elevated myocardial infarction)  Goal:	transfer to [facility]  Assessment and plan of treatment:	transfer to [facility] for CABG  resume heparin 6hr post sheath removal  Secondary Diagnosis:	DM (diabetes mellitus)  Goal:	diabetic control  Assessment and plan of treatment:	insulin sliding scale  Secondary Diagnosis:	Hypothyroid

## 2018-03-08 NOTE — H&P ADULT - NSHPLABSRESULTS_GEN_ALL_CORE
10.5   11.61 )-----------( 213      ( 08 Mar 2018 05:01 )               33.2   03-08    139  |  104  |  12  ----------------------------<  140<H>  3.8   |  29  |  0.66    Ca    8.6      08 Mar 2018 05:01  Phos  3.5     03-08  Mg     1.9     03-08    TPro  7.0  /  Alb  3.1<L>  /  TBili  0.3  /  DBili  x   /  AST  53<H>  /  ALT  57  /  AlkPhos  57  03-07

## 2018-03-08 NOTE — DISCHARGE NOTE ADULT - HOSPITAL COURSE
53y female w/      *NSTEMI   s/p cath this morning showing left main and 3 vessel disease  - Cardio f/u appreciated- transfer to Dr. Rudy Hazel  - restart heparin 6hr post sheath removal  - echo pending  - -ASA, plavix, statin, BB

## 2018-03-08 NOTE — DISCHARGE NOTE ADULT - PLAN OF CARE
transfer to [facility] transfer to [facility] for CABG  resume heparin 6hr post sheath removal diabetic control insulin sliding scale

## 2018-03-08 NOTE — PROGRESS NOTE ADULT - SUBJECTIVE AND OBJECTIVE BOX
HPI:  53 y.o. female with PMH HTN, hypothyroidism and DM2 presents with chest pain and shortness of breath for the past few days. Pt reports sub sternal chest pressure, moderate intensity, non- radiating that is associated with shortness of breath occuring a rest  today which prompted her come to hospital.  She has also gotten similair symptoms with less intensity with exertion over the past few days. Denies palpitations, orthopnea, PND , or LE edema. Also denies fever, chills, n/v, abd pain, diarrhea or dysuria. When she presented she was markedly hypertensive and she received medication which has helped her BP.     3/8/18: s/p CC.  3 vessel CAD and mildly impaired LV FX.  Echo to be done this am and pending.    PAST MEDICAL & SURGICAL HISTORY:  Hypothyroid  DM (diabetes mellitus)  HTN (hypertension)    SOCIAL HISTORY: Non-Smoker/Social ETOH/ No Ilicit Drug use.    FAMILY HISTORY: Mom and brothers have htn. Dad  of heart attack at age 59.    Allergies    No Known Allergies    Intolerances    HOME MEDICATIONS:   Home Medications:  aspirin 81 mg oral tablet: 1 tab(s) orally once a day (07 Mar 2018 22:02)  levothyroxine 25 mcg (0.025 mg) oral tablet: 1 tab(s) orally once a day (07 Mar 2018 22:02)  metFORMIN 500 mg oral tablet, extended release: 1 tab(s) orally 2 times a day (07 Mar 2018 22:02)  Norvasc 10 mg oral tablet: 1 tab(s) orally once a day (07 Mar 2018 22:02)      MEDICATIONS  (STANDING):  amLODIPine   Tablet 5 milliGRAM(s) Oral daily  aspirin enteric coated 162 milliGRAM(s) Oral daily  atorvastatin 40 milliGRAM(s) Oral at bedtime  clopidogrel Tablet 75 milliGRAM(s) Oral daily  dextrose 5%. 1000 milliLiter(s) (50 mL/Hr) IV Continuous <Continuous>  dextrose 50% Injectable 12.5 Gram(s) IV Push once  dextrose 50% Injectable 25 Gram(s) IV Push once  dextrose 50% Injectable 25 Gram(s) IV Push once  insulin lispro (HumaLOG) corrective regimen sliding scale   SubCutaneous every 6 hours  levothyroxine 25 MICROGram(s) Oral daily  metoprolol     tartrate 12.5 milliGRAM(s) Oral two times a day  sodium chloride 0.9%. 1000 milliLiter(s) (75 mL/Hr) IV Continuous <Continuous>    MEDICATIONS  (PRN):  dextrose Gel 1 Dose(s) Oral once PRN Blood Glucose LESS THAN 70 milliGRAM(s)/deciliter  glucagon  Injectable 1 milliGRAM(s) IntraMuscular once PRN Glucose LESS THAN 70 milligrams/deciliter  ondansetron Injectable 4 milliGRAM(s) IV Push every 6 hours PRN Nausea    Vital Signs Last 24 Hrs  T(C): 36.6 (08 Mar 2018 07:50), Max: 36.9 (08 Mar 2018 05:00)  T(F): 97.8 (08 Mar 2018 07:50), Max: 98.4 (08 Mar 2018 05:00)  HR: 83 (08 Mar 2018 07:50) (71 - 88)  BP: 177/95 (08 Mar 2018 07:50) (144/75 - 193/101)  BP(mean): 101 (08 Mar 2018 06:01) (101 - 115)  RR: 16 (08 Mar 2018 07:50) (16 - 26)  SpO2: 99% (08 Mar 2018 07:50) (93% - 99%)    I&O's Detail    07 Mar 2018 07:01  -  08 Mar 2018 07:00  --------------------------------------------------------  IN:    heparin  Infusion.: 66.4 mL    sodium chloride 0.9%.: 368.2 mL  Total IN: 434.6 mL    OUT:  Total OUT: 0 mL    Total NET: 434.6 mL          Daily Height in cm: 165.1 (07 Mar 2018 19:40)    Daily Weight in k.2 (08 Mar 2018 01:55)    REVIEW OF SYSTEMS: 13 systems were reviewed and all negative except for comments above.    PHYSICAL EXAM:    Constitutional: NAD, awake and alert, well-developed  HEENT: PERRLA, EOMI,  No oral cyanosis. Oropharynx Clean and Dry.  Neck:  supple,  No JVD, No Thyroid enlargement. No Carotid Bruits bilaterally.  Respiratory: Breath sounds are clear bilaterally, No wheezing, rales or rhonchi  Cardiovascular: NL S1 and S2, RRR, +s4. 1/6 ANGLE to LUSB  Gastrointestinal: Bowel Sounds present, soft, NT, ND.   Extremities: No peripheral edema. No clubbing or cyanosis.   Vascular: 2+ peripheral pulses in LE   Neurological: A/O x 3, no focal motor deficits  Musculoskeletal: no calf tenderness.  Skin: No rashes.      LABS: All Labs Reviewed:                                   10.5   11.61 )-----------( 213      ( 08 Mar 2018 05:01 )             33.2     03-08    139  |  104  |  12  ----------------------------<  140<H>  3.8   |  29  |  0.66    Ca    8.6      08 Mar 2018 05:01  Phos  3.5     03-08  Mg     1.9     03-08    TPro  7.0  /  Alb  3.1<L>  /  TBili  0.3  /  DBili  x   /  AST  53<H>  /  ALT  57  /  AlkPhos  57  03-07    CARDIAC MARKERS ( 08 Mar 2018 05:01 )  0.559 ng/mL / x     / x     / x     / x      CARDIAC MARKERS ( 07 Mar 2018 23:36 )  0.626 ng/mL / x     / x     / x     / x      CARDIAC MARKERS ( 07 Mar 2018 20:53 )  0.448 ng/mL / x     / 103 U/L / x     / x          LIVER FUNCTIONS - ( 07 Mar 2018 20:53 )  Alb: 3.1 g/dL / Pro: 7.0 gm/dL / ALK PHOS: 57 U/L / ALT: 57 U/L / AST: 53 U/L / GGT: x           PT/INR - ( 07 Mar 2018 20:53 )   PT: 10.7 sec;   INR: 0.99 ratio         PTT - ( 08 Mar 2018 05:01 )  PTT:59.9 sec    RADIOLOGY:    EKG:  < from: 12 Lead ECG (18 @ 19:45) >  Normal sinus rhythmwith sinus arrhythmia  Possible Left atrial enlargement  ST & T wave abnormality, consider inferolateral ischemia  When compared with ECG of 25-SEP-2015 11:30,  T wave inversion now evident in Inferior leads  T wave inversion now evident in Anterolateral leads  < end of copied text >    ECHO:  2015: NL LV FX. Mild MR and Mild MVP.    Cardiac cath:  L main and 3 vessel CAD and Mild Impaired LV FX.

## 2018-03-08 NOTE — H&P ADULT - ASSESSMENT
This is a 53 y.o. female with PMH HTN, hypothyroidism and DM2 presented to [facility] 3/8/18  with c/o  with chest pain and shortness of breath for the past few days. Pt reports sudden CP, substernal, moderate non radiating that is associated with shortness of breath.  Reports unstable angina . Denies fever, chills, n/v, abd pain, diarrhea or dysuria. Denies hx of cardiac issues. Pt is noncompliant with her medications. Denies stressors recently.     Ruled in NSTEMI Underwent cardiac cath via right radial at [facility] found  to have Left main and 3 vessel CAD with Mildly impaired LV FX. Plavix loaded. Transferred to  [facility]  for CABG in am.  Heparin GTT -P2y12 stat and again in am scheduled for CABG 3/8/17

## 2018-03-08 NOTE — H&P ADULT - NSHPREVIEWOFSYSTEMS_GEN_ALL_CORE
GENERAL SYMPTOMS: No weakness, fevers or chills  ENT: No visual changes;  No vertigo or throat pain   SKIN/BREAST: Negative  NECK: No pain or stiffness  RESPIRATORY/THORAX:  PRADHAN No cough, wheezing, hemoptysis  CARDIOVASCULAR: No chest pain or palpitations  GASTROINTESTINAL: No abdominal or epigastric pain. No nausea, vomiting, or hematemesis; No diarrhea or constipation.   GENITOURINARY: No dysuria, frequency or hematuria  NEUROLOGICAL: No numbness or weakness  MUSCULOSKELETAL: equal strength throughout  SKIN: No itching, burning, rashes, or lesions   All other review of systems is negative unless indicated above.  HEMATOLOGY/LYMPHATICS: Negative  ENDOCRINE: Negative

## 2018-03-08 NOTE — PROGRESS NOTE ADULT - SUBJECTIVE AND OBJECTIVE BOX
Cardiac Surgery Pre-op Note:    CC: Patient is a 53y old  Female who presents with a chief complaint of I need heart surgery  Referring Physician:Dr Galeano                                                                                                           Surgeon:Dr Rudy brito: 3/9/18)CABG  No Known Allergies    HPI:  This is a a53 y.o. female with PMH HTN, hypothyroidism and DM2 presented to [facility] 3/8/18  with c/o  with chest pain and shortness of breath for the past few days. Pt reports sudden CP, substernal, moderate non radiating that is associated with shortness of breath.  Reports unstable angina . Denies fever, chills, n/v, abd pain, diarrhea or dysuria. Denies hx of cardiac issues. Pt is noncompliant with her medications. Denies stressors recently.     Ruled in NSTEMI Underwent cardiac cath via right radial at [facility] found  to have Left main and 3 vessel CAD with Mildly impaired LV FX. Plavix loaded.   Transferred to  [facility]  for CABG in am. (08 Mar 2018 17:32)      PAST MEDICAL & SURGICAL HISTORY:  Hypothyroid  DM (diabetes mellitus)  HTN (hypertension)  No significant past surgical history      MEDICATIONS  (STANDING):  amLODIPine   Tablet 10 milliGRAM(s) Oral daily  aspirin enteric coated 81 milliGRAM(s) Oral daily  atorvastatin 40 milliGRAM(s) Oral at bedtime  cefuroxime  IVPB 1500 milliGRAM(s) IV Intermittent once  chlorhexidine 0.12% Liquid 15 milliLiter(s) Swish and Spit two times a day  chlorhexidine 4% Liquid 1 Application(s) Topical daily  heparin  Infusion 900 Unit(s)/Hr (9 mL/Hr) IV Continuous <Continuous>  insulin lispro (HumaLOG) corrective regimen sliding scale   SubCutaneous Before meals and at bedtime  levothyroxine 25 MICROGram(s) Oral daily  metoprolol     tartrate 25 milliGRAM(s) Oral two times a day    MEDICATIONS  (PRN):        Labs:                        12.0   12.2  )-----------( 233      ( 08 Mar 2018 18:34 )             34.2     03-08    140  |  102  |  x   ----------------------------<  x   3.8   |  x   |  x     Ca    8.6      08 Mar 2018 05:01  Phos  3.5     03-08  Mg     1.9     03-08    TPro  7.0  /  Alb  3.1<L>  /  TBili  0.3  /  DBili  x   /  AST  53<H>  /  ALT  57  /  AlkPhos  57      PT/INR - ( 08 Mar 2018 18:34 )   PT: 10.9 sec;   INR: 1.00 ratio         PTT - ( 08 Mar 2018 18:34 )  PTT:39.5 sec    Blood Type:   HGB A1C: Hemoglobin A1C, Whole Blood: 7.5 % ( @ 05:01)    Prealbumin:   Pro-BNP: Serum Pro-Brain Natriuretic Peptide: 640 pg/mL ( @ 20:53)    Thyroid Panel:  @ 20:53/4.820  --/--/--    MRSA:  / MSSA:   Urinalysis Basic - ( 08 Mar 2018 18:34 )    Color: Yellow / Appearance: Clear / S.018 / pH: x  Gluc: x / Ketone: Negative  / Bili: Negative / Urobili: Negative   Blood: x / Protein: 150 mg/dL / Nitrite: Negative   Leuk Esterase: Negative / RBC: 25-50 /HPF / WBC 2-5 /HPF   Sq Epi: x / Non Sq Epi: Occasional /HPF / Bacteria: x        CXR: < from: Xray Chest 2 Views PA/Lat (18 @ 21:12) >  e lungs are clear.  Heart: The heart is normal in size.  Mediastinum: The mediastinum is within normal limits.    IMPRESSION:    Clear lungs.  EKG:< from: 12 Lead ECG (18 @ 06:46) >  ssible Left atrial enlargement  ST & T wave abnormality, consider inferolateral ischemia  Abnormal ECG  When compared with ECG of 07-MAR-2018 19:45,  No significant change was found  Carotid Duplex:      PFT's:pending    Echocardiogram :pending     Cardiac catheterization:< from: Cardiac Cath Lab - Adult (18 @ 10:42) >   Left main and 3 vessel CAd with Mildly impaired LV FX.     Recommendations     Urgent CABG is recommended.    Vein Mapping:    Gen:  WN/WD NAD  Neuro:  AAOx3, nonfocal  Pulm:  CTA B/L  CV: RRR, S1S2  Abd:  Soft, NT, ND +BS  Ext: No edema, + peripheral pulses no varicosities       Pt has AICD/PPM [ ] Yes  [x ] No             Brand Name:  Pre-op Beta Blocker ordered within 24 hrs of surgery (CABG ONLY)?  [ x] Yes  [ ] No  If not, Why?  Type & Cross  [x ] Yes  [ ] No  NPO after Midnight [ x] Yes  [ ] No  Pre-op ABX ordered, to be taped on chart:  [ x] Yes  [ ] No     Hibiclens/Peridex ordered [x ] Yes  [ ] No  Intraop on Hold: PRBCs, CXR, MADDIE x ]   Consent obtained  [x ] Yes  [ ] No

## 2018-03-08 NOTE — DISCHARGE NOTE ADULT - MEDICATION SUMMARY - MEDICATIONS TO TAKE
I will START or STAY ON the medications listed below when I get home from the hospital:    aspirin 81 mg oral delayed release tablet  -- 2 tab(s) by mouth once a day  -- Indication: For CAD    atorvastatin 40 mg oral tablet  -- 1 tab(s) by mouth once a day (at bedtime)  -- Indication: For CAD    clopidogrel 75 mg oral tablet  -- 1 tab(s) by mouth once a day  -- Indication: For CAD    metoprolol tartrate 25 mg oral tablet  -- 1 tab(s) by mouth 2 times a day  -- Indication: For CAD    Norvasc 10 mg oral tablet  -- 1 tab(s) by mouth once a day  -- Indication: For HTN (hypertension)    levothyroxine 25 mcg (0.025 mg) oral tablet  -- 1 tab(s) by mouth once a day  -- Indication: For Hypothyroid

## 2018-03-08 NOTE — DISCHARGE NOTE ADULT - MEDICATION SUMMARY - MEDICATIONS TO STOP TAKING
I will STOP taking the medications listed below when I get home from the hospital:    metFORMIN 500 mg oral tablet, extended release  -- 1 tab(s) by mouth 2 times a day

## 2018-03-08 NOTE — DISCHARGE NOTE ADULT - CARE PROVIDER_API CALL
Artemio Grant), Surgery; Thoracic and Cardiac Surgery  [address] [place] [address]  Phone: [phone]  Fax: [phone]

## 2018-03-08 NOTE — H&P ADULT - HISTORY OF PRESENT ILLNESS
This is a a53 y.o. female with PMH HTN, hypothyroidism and DM2 presented to [facility] 3/8/18  with c/o  with chest pain and shortness of breath for the past few days. Pt reports sudden CP, substernal, moderate non radiating that is associated with shortness of breath.  Reports unstable angina . Denies fever, chills, n/v, abd pain, diarrhea or dysuria. Denies hx of cardiac issues. Pt is noncompliant with her medications. Denies stressors recently.     Ruled in NSTEMI [facility] found  to have Left main and 3 vessel CAD with Mildly impaired LV FX. Plavix loaded.   Transferred to  [facility]  for CABG in am.

## 2018-03-08 NOTE — DISCHARGE NOTE ADULT - PATIENT PORTAL LINK FT
You can access the [facility] Patient Portal, offered by [facility], by registering with the following website: [email or web address]

## 2018-03-08 NOTE — PROGRESS NOTE ADULT - SUBJECTIVE AND OBJECTIVE BOX
s/p C revealing 3V CAD    Patient feels well.  Denies chest pain, shortness of breath, dizziness or palpitations at this time    Right radial hemoband in place.  Procedure site CDI, no bleeding, no hematoma, able to move all digits with capillary refill < 3 seconds, fingers warm      HPI:  53 y.o. female with PMH HTN, hypothyroidism and DM2 presents with chest pain and shortness of breath for the past few days. Pt reports sudden CP, substernal, moderate nonradiating that is associated with shortness of breath. Pt denies prior episodes. Pt states symptoms worse with exertion and asympt at rest. Pt is noncompliant with her medications. Denies stressors recently.  now s/p [facility] revealing 3V obstructive CAD    -transfer to [facility] for CT surgery consult  -continue tele in CCU  -continue hospitalist service  -restart Heparin gtt 6 hours post radial band removal  -vital signs, labs, diet and activity per post procedure orders  -ambulate ad alex post bedrest  -iv hydration  -encourage PO fluids  -plan of care discussed with patient, family and MD Galeano  -post procedure instructions reviewed  -Discussed therapeutic lifestyle changes to reduce risk factors such as following a cardiac diet, weight loss, maintaining a healthy weight, exercise, smoking cessation, medication compliance, and regular follow-up  with MD to know your numbers (BP, cholesterol, weight, and glucose) s/p C revealing 3V CAD    Patient feels well.  Denies chest pain, shortness of breath, dizziness or palpitations at this time    Right radial hemoband in place.  Procedure site CDI, no bleeding, no hematoma, able to move all digits with capillary refill < 3 seconds, fingers warm      HPI:  53 y.o. female with PMH HTN, hypothyroidism and DM2 presents with chest pain and shortness of breath for the past few days. Pt reports sudden CP, substernal, moderate nonradiating that is associated with shortness of breath. Pt denies prior episodes. Pt states symptoms worse with exertion and asympt at rest. Pt is noncompliant with her medications. Denies stressors recently.  now s/p [facility] revealing 3V CAD    -transfer to [facility] for CT surgery consult with MD Grant  -continue tele in CCU  -continue hospitalist service, MD Wood aware  -restart Heparin gtt 6 hours post radial band removal  -vital signs, labs, diet and activity per post procedure orders  -ambulate ad alex post bedrest  -iv hydration  -encourage PO fluids  -plan of care discussed with patient, family and MD Galeano  -post procedure instructions reviewed  -Discussed therapeutic lifestyle changes to reduce risk factors such as following a cardiac diet, weight loss, maintaining a healthy weight, exercise, smoking cessation, medication compliance, and regular follow-up  with MD to know your numbers (BP, cholesterol, weight, and glucose)  -awaiting transport  -echo pending

## 2018-03-08 NOTE — PROGRESS NOTE ADULT - SUBJECTIVE AND OBJECTIVE BOX
cc: cp  hpi: 53y female w/ PMH HTN, hypothryoidism, T2D presents w/ sob, cp and admitted w/ NSTEMI.   Now s/p cath w/ 3 vessel disease and will be transferred to [place].  Pt seen early this morning w/  at bedside. She denies cp, sob, palp, abd pain, n/v/d.  No wrist pain.      ros- 10pt ros neg    Vital Signs Last 24 Hrs  T(C): 36.6 (08 Mar 2018 07:50), Max: 36.9 (08 Mar 2018 05:00)  T(F): 97.8 (08 Mar 2018 07:50), Max: 98.4 (08 Mar 2018 05:00)  HR: 95 (08 Mar 2018 13:21) (71 - 95)  BP: 156/84 (08 Mar 2018 13:00) (144/75 - 193/101)  BP(mean): 101 (08 Mar 2018 13:00) (97 - 115)  RR: 23 (08 Mar 2018 13:21) (13 - 26)  SpO2: 93% (08 Mar 2018 11:00) (90% - 99%)      PHYSICAL EXAM:  General: NAD, comfortable  Neuro: AAOx3, no focal deficits  HEENT: NCAT  Neck: Soft and supple, No JVD  Respiratory: CTA b/l, no w/r/r  Cardiovascular: S1 and S2, RRR  Gastrointestinal: +BS, non ttp   Extremities: No c/c/e; right wrist bandaged  Vascular: 2+ peripheral pulses  Musculoskeletal: full rom         LABS: All Labs Reviewed:                        10.5   11.61 )-----------( 213      ( 08 Mar 2018 05:01 )             33.2     03-08    139  |  104  |  12  ----------------------------<  140<H>  3.8   |  29  |  0.66    Ca    8.6      08 Mar 2018 05:01  Phos  3.5     03-08  Mg     1.9     03-08    TPro  7.0  /  Alb  3.1<L>  /  TBili  0.3  /  DBili  x   /  AST  53<H>  /  ALT  57  /  AlkPhos  57  03-07    PT/INR - ( 07 Mar 2018 20:53 )   PT: 10.7 sec;   INR: 0.99 ratio         PTT - ( 08 Mar 2018 05:01 )  PTT:59.9 sec  CARDIAC MARKERS ( 08 Mar 2018 10:54 )  0.289 ng/mL / x     / 85 U/L / x     / x      CARDIAC MARKERS ( 08 Mar 2018 05:01 )  0.559 ng/mL / x     / x     / x     / x      CARDIAC MARKERS ( 07 Mar 2018 23:36 )  0.626 ng/mL / x     / x     / x     / x      CARDIAC MARKERS ( 07 Mar 2018 20:53 )  0.448 ng/mL / x     / 103 U/L / x     / x        ekg- twi inferior and lateral leads     MEDICATIONS  (STANDING):  amLODIPine   Tablet 5 milliGRAM(s) Oral daily  aspirin enteric coated 162 milliGRAM(s) Oral daily  atorvastatin 40 milliGRAM(s) Oral at bedtime  clopidogrel Tablet 75 milliGRAM(s) Oral daily  dextrose 5%. 1000 milliLiter(s) (50 mL/Hr) IV Continuous <Continuous>  dextrose 50% Injectable 12.5 Gram(s) IV Push once  dextrose 50% Injectable 25 Gram(s) IV Push once  dextrose 50% Injectable 25 Gram(s) IV Push once  insulin lispro (HumaLOG) corrective regimen sliding scale   SubCutaneous every 6 hours  levothyroxine 25 MICROGram(s) Oral daily  metoprolol     tartrate 25 milliGRAM(s) Oral two times a day  sodium chloride 0.9%. 1000 milliLiter(s) (75 mL/Hr) IV Continuous <Continuous>    MEDICATIONS  (PRN):  dextrose Gel 1 Dose(s) Oral once PRN Blood Glucose LESS THAN 70 milliGRAM(s)/deciliter  glucagon  Injectable 1 milliGRAM(s) IntraMuscular once PRN Glucose LESS THAN 70 milligrams/deciliter  ondansetron Injectable 4 milliGRAM(s) IV Push every 6 hours PRN Nausea    Assessment and Plan:           53y female w/      *NSTEMI   s/p cath this morning showing left main and 3 vessel disease  - Cardio f/u appreciated- transfer to Dr. Rudy Hazel  - restart heparin 6hr post sheath removal  - echo pending  - -ASA, plavix, statin, BB     *hypothyroidism  -cont synthroid    *HTN, uncontrolled  -as above    *DM2  -hold metformin  -fingerstick monitoring and ISS    #DVT px  -heparin drip

## 2018-03-08 NOTE — H&P ADULT - NSHPPHYSICALEXAM_GEN_ALL_CORE
Constitutional:  Well developed, well nourished  Eyes:  EOMI,PERRL  ENMT:  WDL  Neck:  no bruits ,no thyromegaly  Breasts :not examined  Back: no deformities no kyphosis  Respiratory: Breath  sounds equal& clear throughout  Cardiovascular:  S1 S2 RRR regular rate and rhythm no murmurs rubs  Gastrointestinal: Soft nontender positive bowels sounds   Genitourinary:  not examined  Rectal: not examined  Extremities:  B/L LE warm + pedal pulse no edema  Vascular :Equal and normal throughout  Neurological: Alert and oriented no focal deficits  Skin: normal no rashes   Lymph Nodes: non tender   Musculoskeletal: equal  strength throughout

## 2018-03-08 NOTE — H&P ADULT - PROBLEM SELECTOR PLAN 1
heparin gtt for CAD  NPO at midnight  P2y12 now and in am   LABS Type and screen  Carotids  ECHO in am  EKG Hibiclens tonight and in am  CABG 3/9/18

## 2018-03-09 DIAGNOSIS — E11.9 TYPE 2 DIABETES MELLITUS WITHOUT COMPLICATIONS: ICD-10-CM

## 2018-03-09 DIAGNOSIS — E03.9 HYPOTHYROIDISM, UNSPECIFIED: ICD-10-CM

## 2018-03-09 LAB
ANION GAP SERPL CALC-SCNC: 11 MMOL/L — SIGNIFICANT CHANGE UP (ref 5–17)
APTT BLD: 58.3 SEC — HIGH (ref 27.5–37.4)
APTT BLD: 72.1 SEC — HIGH (ref 27.5–37.4)
APTT BLD: 73.1 SEC — HIGH (ref 27.5–37.4)
BUN SERPL-MCNC: 10 MG/DL — SIGNIFICANT CHANGE UP (ref 7–23)
CALCIUM SERPL-MCNC: 9.1 MG/DL — SIGNIFICANT CHANGE UP (ref 8.4–10.5)
CHLORIDE SERPL-SCNC: 103 MMOL/L — SIGNIFICANT CHANGE UP (ref 96–108)
CO2 SERPL-SCNC: 25 MMOL/L — SIGNIFICANT CHANGE UP (ref 22–31)
CREAT SERPL-MCNC: 0.68 MG/DL — SIGNIFICANT CHANGE UP (ref 0.5–1.3)
GLUCOSE BLDC GLUCOMTR-MCNC: 105 MG/DL — HIGH (ref 70–99)
GLUCOSE BLDC GLUCOMTR-MCNC: 106 MG/DL — HIGH (ref 70–99)
GLUCOSE BLDC GLUCOMTR-MCNC: 108 MG/DL — HIGH (ref 70–99)
GLUCOSE BLDC GLUCOMTR-MCNC: 110 MG/DL — HIGH (ref 70–99)
GLUCOSE BLDC GLUCOMTR-MCNC: 118 MG/DL — HIGH (ref 70–99)
GLUCOSE BLDC GLUCOMTR-MCNC: 121 MG/DL — HIGH (ref 70–99)
GLUCOSE BLDC GLUCOMTR-MCNC: 122 MG/DL — HIGH (ref 70–99)
GLUCOSE BLDC GLUCOMTR-MCNC: 129 MG/DL — HIGH (ref 70–99)
GLUCOSE BLDC GLUCOMTR-MCNC: 129 MG/DL — HIGH (ref 70–99)
GLUCOSE BLDC GLUCOMTR-MCNC: 133 MG/DL — HIGH (ref 70–99)
GLUCOSE BLDC GLUCOMTR-MCNC: 136 MG/DL — HIGH (ref 70–99)
GLUCOSE BLDC GLUCOMTR-MCNC: 137 MG/DL — HIGH (ref 70–99)
GLUCOSE BLDC GLUCOMTR-MCNC: 139 MG/DL — HIGH (ref 70–99)
GLUCOSE BLDC GLUCOMTR-MCNC: 141 MG/DL — HIGH (ref 70–99)
GLUCOSE BLDC GLUCOMTR-MCNC: 142 MG/DL — HIGH (ref 70–99)
GLUCOSE BLDC GLUCOMTR-MCNC: 144 MG/DL — HIGH (ref 70–99)
GLUCOSE BLDC GLUCOMTR-MCNC: 148 MG/DL — HIGH (ref 70–99)
GLUCOSE BLDC GLUCOMTR-MCNC: 150 MG/DL — HIGH (ref 70–99)
GLUCOSE BLDC GLUCOMTR-MCNC: 167 MG/DL — HIGH (ref 70–99)
GLUCOSE BLDC GLUCOMTR-MCNC: 179 MG/DL — HIGH (ref 70–99)
GLUCOSE BLDC GLUCOMTR-MCNC: 191 MG/DL — HIGH (ref 70–99)
GLUCOSE BLDC GLUCOMTR-MCNC: 222 MG/DL — HIGH (ref 70–99)
GLUCOSE SERPL-MCNC: 154 MG/DL — HIGH (ref 70–99)
HCT VFR BLD CALC: 31.7 % — LOW (ref 34.5–45)
HGB BLD-MCNC: 11 G/DL — LOW (ref 11.5–15.5)
INR BLD: 1.03 RATIO — SIGNIFICANT CHANGE UP (ref 0.88–1.16)
INR BLD: 1.06 RATIO — SIGNIFICANT CHANGE UP (ref 0.88–1.16)
MCHC RBC-ENTMCNC: 30.5 PG — SIGNIFICANT CHANGE UP (ref 27–34)
MCHC RBC-ENTMCNC: 34.8 GM/DL — SIGNIFICANT CHANGE UP (ref 32–36)
MCV RBC AUTO: 87.7 FL — SIGNIFICANT CHANGE UP (ref 80–100)
MRSA PCR RESULT.: SIGNIFICANT CHANGE UP
PA ADP PRP-ACNC: 307 PRU — SIGNIFICANT CHANGE UP (ref 194–417)
PLATELET # BLD AUTO: 239 K/UL — SIGNIFICANT CHANGE UP (ref 150–400)
POTASSIUM SERPL-MCNC: 3.5 MMOL/L — SIGNIFICANT CHANGE UP (ref 3.5–5.3)
POTASSIUM SERPL-SCNC: 3.5 MMOL/L — SIGNIFICANT CHANGE UP (ref 3.5–5.3)
PROTHROM AB SERPL-ACNC: 11.2 SEC — SIGNIFICANT CHANGE UP (ref 9.8–12.7)
PROTHROM AB SERPL-ACNC: 11.6 SEC — SIGNIFICANT CHANGE UP (ref 9.8–12.7)
RBC # BLD: 3.61 M/UL — LOW (ref 3.8–5.2)
RBC # FLD: 12.5 % — SIGNIFICANT CHANGE UP (ref 10.3–14.5)
RH IG SCN BLD-IMP: POSITIVE — SIGNIFICANT CHANGE UP
S AUREUS DNA NOSE QL NAA+PROBE: SIGNIFICANT CHANGE UP
SODIUM SERPL-SCNC: 139 MMOL/L — SIGNIFICANT CHANGE UP (ref 135–145)
WBC # BLD: 12.6 K/UL — HIGH (ref 3.8–10.5)
WBC # FLD AUTO: 12.6 K/UL — HIGH (ref 3.8–10.5)

## 2018-03-09 PROCEDURE — 93010 ELECTROCARDIOGRAM REPORT: CPT

## 2018-03-09 PROCEDURE — 93306 TTE W/DOPPLER COMPLETE: CPT | Mod: 26

## 2018-03-09 PROCEDURE — 94010 BREATHING CAPACITY TEST: CPT | Mod: 26

## 2018-03-09 RX ORDER — POTASSIUM CHLORIDE 20 MEQ
10 PACKET (EA) ORAL
Qty: 0 | Refills: 0 | Status: DISCONTINUED | OUTPATIENT
Start: 2018-03-11 | End: 2018-03-12

## 2018-03-09 RX ORDER — ASPIRIN/CALCIUM CARB/MAGNESIUM 324 MG
325 TABLET ORAL DAILY
Qty: 0 | Refills: 0 | Status: DISCONTINUED | OUTPATIENT
Start: 2018-03-11 | End: 2018-03-12

## 2018-03-09 RX ORDER — DEXTROSE 50 % IN WATER 50 %
50 SYRINGE (ML) INTRAVENOUS
Qty: 0 | Refills: 0 | Status: DISCONTINUED | OUTPATIENT
Start: 2018-03-11 | End: 2018-03-17

## 2018-03-09 RX ORDER — METOPROLOL TARTRATE 50 MG
25 TABLET ORAL ONCE
Qty: 0 | Refills: 0 | Status: COMPLETED | OUTPATIENT
Start: 2018-03-09 | End: 2018-03-09

## 2018-03-09 RX ORDER — DOCUSATE SODIUM 100 MG
100 CAPSULE ORAL THREE TIMES A DAY
Qty: 0 | Refills: 0 | Status: DISCONTINUED | OUTPATIENT
Start: 2018-03-11 | End: 2018-03-17

## 2018-03-09 RX ORDER — SODIUM CHLORIDE 9 MG/ML
1000 INJECTION, SOLUTION INTRAVENOUS
Qty: 0 | Refills: 0 | Status: DISCONTINUED | OUTPATIENT
Start: 2018-03-11 | End: 2018-03-13

## 2018-03-09 RX ORDER — INFLUENZA VIRUS VACCINE 15; 15; 15; 15 UG/.5ML; UG/.5ML; UG/.5ML; UG/.5ML
0.5 SUSPENSION INTRAMUSCULAR ONCE
Qty: 0 | Refills: 0 | Status: DISCONTINUED | OUTPATIENT
Start: 2018-03-09 | End: 2018-03-12

## 2018-03-09 RX ORDER — POTASSIUM CHLORIDE 20 MEQ
10 PACKET (EA) ORAL ONCE
Qty: 0 | Refills: 0 | Status: DISCONTINUED | OUTPATIENT
Start: 2018-03-11 | End: 2018-03-12

## 2018-03-09 RX ORDER — NITROGLYCERIN 6.5 MG
1 CAPSULE, EXTENDED RELEASE ORAL EVERY 6 HOURS
Qty: 0 | Refills: 0 | Status: DISCONTINUED | OUTPATIENT
Start: 2018-03-09 | End: 2018-03-11

## 2018-03-09 RX ORDER — DEXTROSE 50 % IN WATER 50 %
25 SYRINGE (ML) INTRAVENOUS
Qty: 0 | Refills: 0 | Status: DISCONTINUED | OUTPATIENT
Start: 2018-03-11 | End: 2018-03-17

## 2018-03-09 RX ORDER — INSULIN HUMAN 100 [IU]/ML
2 INJECTION, SOLUTION SUBCUTANEOUS
Qty: 100 | Refills: 0 | Status: DISCONTINUED | OUTPATIENT
Start: 2018-03-11 | End: 2018-03-13

## 2018-03-09 RX ORDER — METOPROLOL TARTRATE 50 MG
50 TABLET ORAL EVERY 8 HOURS
Qty: 0 | Refills: 0 | Status: DISCONTINUED | OUTPATIENT
Start: 2018-03-09 | End: 2018-03-11

## 2018-03-09 RX ORDER — PANTOPRAZOLE SODIUM 20 MG/1
40 TABLET, DELAYED RELEASE ORAL DAILY
Qty: 0 | Refills: 0 | Status: DISCONTINUED | OUTPATIENT
Start: 2018-03-11 | End: 2018-03-12

## 2018-03-09 RX ADMIN — Medication 25 MILLIGRAM(S): at 14:49

## 2018-03-09 RX ADMIN — CHLORHEXIDINE GLUCONATE 15 MILLILITER(S): 213 SOLUTION TOPICAL at 06:00

## 2018-03-09 RX ADMIN — CHLORHEXIDINE GLUCONATE 15 MILLILITER(S): 213 SOLUTION TOPICAL at 17:36

## 2018-03-09 RX ADMIN — Medication 1 INCH(S): at 14:52

## 2018-03-09 RX ADMIN — Medication 25 MICROGRAM(S): at 06:01

## 2018-03-09 RX ADMIN — SODIUM CHLORIDE 15 MILLILITER(S): 9 INJECTION, SOLUTION INTRAVENOUS at 16:22

## 2018-03-09 RX ADMIN — HEPARIN SODIUM 12.5 UNIT(S)/HR: 5000 INJECTION INTRAVENOUS; SUBCUTANEOUS at 08:56

## 2018-03-09 RX ADMIN — Medication 25 MILLIGRAM(S): at 06:00

## 2018-03-09 RX ADMIN — ATORVASTATIN CALCIUM 40 MILLIGRAM(S): 80 TABLET, FILM COATED ORAL at 21:06

## 2018-03-09 RX ADMIN — Medication 81 MILLIGRAM(S): at 13:25

## 2018-03-09 RX ADMIN — Medication 25 MILLIGRAM(S): at 13:31

## 2018-03-09 RX ADMIN — Medication 50 MILLIGRAM(S): at 21:06

## 2018-03-09 RX ADMIN — Medication 1 INCH(S): at 20:45

## 2018-03-09 NOTE — PROVIDER CONTACT NOTE (OTHER) - ACTION/TREATMENT ORDERED:
NP and MD made aware. Metoprolol to be increased and nitro-paste added. 25mg metoprolol to be given now.

## 2018-03-09 NOTE — PROGRESS NOTE ADULT - SUBJECTIVE AND OBJECTIVE BOX
HPI:  This is a a53 y.o. female with PMH HTN, hypothyroidism and DM2 presented to [facility] 3/8/18  with c/o  with chest pain and shortness of breath for the past few days. Pt reports sudden CP, substernal, moderate non radiating that is associated with shortness of breath.  Reports unstable angina . Denies fever, chills, n/v, abd pain, diarrhea or dysuria. Denies hx of cardiac issues. Pt is noncompliant with her medications. Denies stressors recently.     Ruled in NSTEMI Underwent cardiac cath via right radial at [facility] found  to have Left main and 3 vessel CAD with Mildly impaired LV FX. Plavix loaded.   Transferred to  [facility]  for CABG in am. (08 Mar 2018 17:32)  Patient has history of diabetes, on oral meds at home, no recent hypoglycemic episodes, no polyuria polydipsia. Patient follows up with PCP for diabetes management.    PAST MEDICAL & SURGICAL HISTORY:  Hypothyroid  DM (diabetes mellitus)  HTN (hypertension)  No significant past surgical history      FAMILY HISTORY:      Social History:    Outpatient Medications:    MEDICATIONS  (STANDING):  amLODIPine   Tablet 10 milliGRAM(s) Oral daily  aspirin enteric coated 81 milliGRAM(s) Oral daily  atorvastatin 40 milliGRAM(s) Oral at bedtime  cefuroxime  IVPB 1500 milliGRAM(s) IV Intermittent once  chlorhexidine 0.12% Liquid 15 milliLiter(s) Swish and Spit two times a day  chlorhexidine 4% Liquid 1 Application(s) Topical daily  dextrose 5%. 1000 milliLiter(s) (15 mL/Hr) IV Continuous <Continuous>  heparin  Infusion 900 Unit(s)/Hr (12.5 mL/Hr) IV Continuous <Continuous>  insulin Infusion 3 Unit(s)/Hr (3 mL/Hr) IV Continuous <Continuous>  levothyroxine 25 MICROGram(s) Oral daily  metoprolol     tartrate 25 milliGRAM(s) Oral every 8 hours    MEDICATIONS  (PRN):      Allergies    No Known Allergies    Intolerances      Review of Systems:  Constitutional: No fever, no chills  Eyes: No blurry vision  Neuro: No tremors  HEENT: No pain, no neck swelling  Cardiovascular: No chest pain, no palpitations  Respiratory: Has SOB, no cough  GI: No nausea, vomiting, abdominal pain  : No dysuria  Skin: no rash  MSK: Has leg swelling, no foot ulcers.  Psych: no depression  Endocrine: no polyuria, polydipsia    ALL OTHER SYSTEMS REVIEWED AND NEGATIVE    UNABLE TO OBTAIN    PHYSICAL EXAM:  VITALS: T(C): 36.6 (03-09-18 @ 11:00)  T(F): 97.9 (03-09-18 @ 11:00), Max: 98.6 (03-08-18 @ 23:22)  HR: 74 (03-09-18 @ 11:00) (68 - 98)  BP: 149/83 (03-09-18 @ 11:00) (123/69 - 176/81)  RR:  (12 - 33)  SpO2:  (93% - 98%)  Wt(kg): --  GENERAL: NAD, well-groomed, well-developed  EYES: No proptosis, no lid lag  HEENT:  Atraumatic, Normocephalic  THYROID: Normal size, no palpable nodules  RESPIRATORY: Clear to auscultation bilaterally; No rales, rhonchi, wheezing  CARDIOVASCULAR: Si S2, No murmurs;  GI: Soft, non distended, normal bowel sounds  SKIN: Dry, intact, No rashes or lesions  MUSCULOSKELETAL: Has BL lower extremity edema.  NEURO:  no tremor, sensation decreased in feet BL,    POCT Blood Glucose.: 108 mg/dL (03-09-18 @ 11:00)  POCT Blood Glucose.: 110 mg/dL (03-09-18 @ 09:59)  POCT Blood Glucose.: 142 mg/dL (03-09-18 @ 09:00)  POCT Blood Glucose.: 137 mg/dL (03-09-18 @ 08:01)  POCT Blood Glucose.: 139 mg/dL (03-09-18 @ 06:59)  POCT Blood Glucose.: 129 mg/dL (03-09-18 @ 06:05)  POCT Blood Glucose.: 121 mg/dL (03-09-18 @ 04:56)  POCT Blood Glucose.: 133 mg/dL (03-09-18 @ 04:05)  POCT Blood Glucose.: 136 mg/dL (03-09-18 @ 03:08)  POCT Blood Glucose.: 148 mg/dL (03-09-18 @ 02:12)  POCT Blood Glucose.: 144 mg/dL (03-09-18 @ 00:59)  POCT Blood Glucose.: 129 mg/dL (03-08-18 @ 23:58)  POCT Blood Glucose.: 220 mg/dL (03-08-18 @ 22:06)  POCT Blood Glucose.: 175 mg/dL (03-08-18 @ 18:12)  POCT Blood Glucose.: 154 mg/dL (03-08-18 @ 06:43)  POCT Blood Glucose.: 147 mg/dL (03-08-18 @ 01:46)                            11.0   12.6  )-----------( 239      ( 09 Mar 2018 02:21 )             31.7       03-09    139  |  103  |  10  ----------------------------<  154<H>  3.5   |  25  |  0.68    EGFR if : 115  EGFR if non : 99    Ca    9.1      03-09  Mg     1.9     03-08  Phos  3.5     03-08    TPro  7.2  /  Alb  3.5  /  TBili  0.4  /  DBili  x   /  AST  55<H>  /  ALT  47<H>  /  AlkPhos  54  03-08      Thyroid Function Tests:  03-08 @ 20:40 TSH 4.52 FreeT4 -- T3 -- Anti TPO -- Anti Thyroglobulin Ab -- TSI --  03-07 @ 20:53 TSH 4.820 FreeT4 -- T3 -- Anti TPO -- Anti Thyroglobulin Ab -- TSI --      Hemoglobin A1C, Whole Blood: 7.5 % <H> [4.0 - 5.6] (03-08-18 @ 05:01)      03-08 Chol 214<H>  HDL 29<L> Trig 351<H>    Radiology:

## 2018-03-09 NOTE — PROVIDER CONTACT NOTE (OTHER) - ASSESSMENT
Patient with second therapeutic PTT on patient specific heparin infusion at 1250 units/hour.  PTT = 72.1

## 2018-03-09 NOTE — PROVIDER CONTACT NOTE (OTHER) - ASSESSMENT
Patient alert and oriented x4 with complaint of intermittent 3/10 left arm pain, but not at this time. RN obtained 12-lead ekg, no noted changes from previous EKG.

## 2018-03-09 NOTE — PROVIDER CONTACT NOTE (OTHER) - ACTION/TREATMENT ORDERED:
NP made aware and stated to maintain infusion at current rate. Next PTT due in AM as per patient specific protocol.

## 2018-03-10 DIAGNOSIS — Z83.3 FAMILY HISTORY OF DIABETES MELLITUS: ICD-10-CM

## 2018-03-10 DIAGNOSIS — I10 ESSENTIAL (PRIMARY) HYPERTENSION: ICD-10-CM

## 2018-03-10 DIAGNOSIS — Z91.14 PATIENT'S OTHER NONCOMPLIANCE WITH MEDICATION REGIMEN: ICD-10-CM

## 2018-03-10 DIAGNOSIS — I21.A1 MYOCARDIAL INFARCTION TYPE 2: ICD-10-CM

## 2018-03-10 DIAGNOSIS — Z79.82 LONG TERM (CURRENT) USE OF ASPIRIN: ICD-10-CM

## 2018-03-10 DIAGNOSIS — E03.9 HYPOTHYROIDISM, UNSPECIFIED: ICD-10-CM

## 2018-03-10 DIAGNOSIS — E11.9 TYPE 2 DIABETES MELLITUS WITHOUT COMPLICATIONS: ICD-10-CM

## 2018-03-10 DIAGNOSIS — Z79.84 LONG TERM (CURRENT) USE OF ORAL HYPOGLYCEMIC DRUGS: ICD-10-CM

## 2018-03-10 LAB
ALBUMIN SERPL ELPH-MCNC: 3.4 G/DL — SIGNIFICANT CHANGE UP (ref 3.3–5)
ALP SERPL-CCNC: 50 U/L — SIGNIFICANT CHANGE UP (ref 40–120)
ALT FLD-CCNC: 34 U/L RC — SIGNIFICANT CHANGE UP (ref 10–45)
ANION GAP SERPL CALC-SCNC: 12 MMOL/L — SIGNIFICANT CHANGE UP (ref 5–17)
APTT BLD: 67.9 SEC — HIGH (ref 27.5–37.4)
AST SERPL-CCNC: 36 U/L — SIGNIFICANT CHANGE UP (ref 10–40)
BILIRUB SERPL-MCNC: 0.5 MG/DL — SIGNIFICANT CHANGE UP (ref 0.2–1.2)
BUN SERPL-MCNC: 14 MG/DL — SIGNIFICANT CHANGE UP (ref 7–23)
CALCIUM SERPL-MCNC: 8.8 MG/DL — SIGNIFICANT CHANGE UP (ref 8.4–10.5)
CHLORIDE SERPL-SCNC: 102 MMOL/L — SIGNIFICANT CHANGE UP (ref 96–108)
CO2 SERPL-SCNC: 26 MMOL/L — SIGNIFICANT CHANGE UP (ref 22–31)
CREAT SERPL-MCNC: 0.71 MG/DL — SIGNIFICANT CHANGE UP (ref 0.5–1.3)
GAS PNL BLDA: SIGNIFICANT CHANGE UP
GLUCOSE BLDC GLUCOMTR-MCNC: 106 MG/DL — HIGH (ref 70–99)
GLUCOSE BLDC GLUCOMTR-MCNC: 114 MG/DL — HIGH (ref 70–99)
GLUCOSE BLDC GLUCOMTR-MCNC: 115 MG/DL — HIGH (ref 70–99)
GLUCOSE BLDC GLUCOMTR-MCNC: 120 MG/DL — HIGH (ref 70–99)
GLUCOSE BLDC GLUCOMTR-MCNC: 128 MG/DL — HIGH (ref 70–99)
GLUCOSE BLDC GLUCOMTR-MCNC: 128 MG/DL — HIGH (ref 70–99)
GLUCOSE BLDC GLUCOMTR-MCNC: 144 MG/DL — HIGH (ref 70–99)
GLUCOSE BLDC GLUCOMTR-MCNC: 164 MG/DL — HIGH (ref 70–99)
GLUCOSE BLDC GLUCOMTR-MCNC: 167 MG/DL — HIGH (ref 70–99)
GLUCOSE BLDC GLUCOMTR-MCNC: 178 MG/DL — HIGH (ref 70–99)
GLUCOSE BLDC GLUCOMTR-MCNC: 182 MG/DL — HIGH (ref 70–99)
GLUCOSE BLDC GLUCOMTR-MCNC: 191 MG/DL — HIGH (ref 70–99)
GLUCOSE BLDC GLUCOMTR-MCNC: 202 MG/DL — HIGH (ref 70–99)
GLUCOSE BLDC GLUCOMTR-MCNC: 221 MG/DL — HIGH (ref 70–99)
GLUCOSE BLDC GLUCOMTR-MCNC: 224 MG/DL — HIGH (ref 70–99)
GLUCOSE BLDC GLUCOMTR-MCNC: 231 MG/DL — HIGH (ref 70–99)
GLUCOSE BLDC GLUCOMTR-MCNC: 267 MG/DL — HIGH (ref 70–99)
GLUCOSE BLDC GLUCOMTR-MCNC: 275 MG/DL — HIGH (ref 70–99)
GLUCOSE BLDC GLUCOMTR-MCNC: 96 MG/DL — SIGNIFICANT CHANGE UP (ref 70–99)
GLUCOSE BLDC GLUCOMTR-MCNC: 98 MG/DL — SIGNIFICANT CHANGE UP (ref 70–99)
GLUCOSE SERPL-MCNC: 119 MG/DL — HIGH (ref 70–99)
HCT VFR BLD CALC: 32.3 % — LOW (ref 34.5–45)
HGB BLD-MCNC: 11.2 G/DL — LOW (ref 11.5–15.5)
INR BLD: 1.07 RATIO — SIGNIFICANT CHANGE UP (ref 0.88–1.16)
MAGNESIUM SERPL-MCNC: 2 MG/DL — SIGNIFICANT CHANGE UP (ref 1.6–2.6)
MCHC RBC-ENTMCNC: 30.6 PG — SIGNIFICANT CHANGE UP (ref 27–34)
MCHC RBC-ENTMCNC: 34.6 GM/DL — SIGNIFICANT CHANGE UP (ref 32–36)
MCV RBC AUTO: 88.5 FL — SIGNIFICANT CHANGE UP (ref 80–100)
PHOSPHATE SERPL-MCNC: 4 MG/DL — SIGNIFICANT CHANGE UP (ref 2.5–4.5)
PLATELET # BLD AUTO: 221 K/UL — SIGNIFICANT CHANGE UP (ref 150–400)
POTASSIUM SERPL-MCNC: 3.9 MMOL/L — SIGNIFICANT CHANGE UP (ref 3.5–5.3)
POTASSIUM SERPL-SCNC: 3.9 MMOL/L — SIGNIFICANT CHANGE UP (ref 3.5–5.3)
PROT SERPL-MCNC: 7 G/DL — SIGNIFICANT CHANGE UP (ref 6–8.3)
PROTHROM AB SERPL-ACNC: 11.6 SEC — SIGNIFICANT CHANGE UP (ref 9.8–12.7)
RBC # BLD: 3.65 M/UL — LOW (ref 3.8–5.2)
RBC # FLD: 12.7 % — SIGNIFICANT CHANGE UP (ref 10.3–14.5)
SODIUM SERPL-SCNC: 140 MMOL/L — SIGNIFICANT CHANGE UP (ref 135–145)
WBC # BLD: 12.9 K/UL — HIGH (ref 3.8–10.5)
WBC # FLD AUTO: 12.9 K/UL — HIGH (ref 3.8–10.5)

## 2018-03-10 PROCEDURE — 36620 INSERTION CATHETER ARTERY: CPT

## 2018-03-10 PROCEDURE — 99291 CRITICAL CARE FIRST HOUR: CPT | Mod: 25

## 2018-03-10 RX ORDER — FUROSEMIDE 40 MG
60 TABLET ORAL ONCE
Qty: 0 | Refills: 0 | Status: DISCONTINUED | OUTPATIENT
Start: 2018-03-10 | End: 2018-03-10

## 2018-03-10 RX ORDER — FENTANYL CITRATE 50 UG/ML
50 INJECTION INTRAVENOUS ONCE
Qty: 0 | Refills: 0 | Status: DISCONTINUED | OUTPATIENT
Start: 2018-03-10 | End: 2018-03-10

## 2018-03-10 RX ORDER — CHLORHEXIDINE GLUCONATE 213 G/1000ML
1 SOLUTION TOPICAL ONCE
Qty: 0 | Refills: 0 | Status: COMPLETED | OUTPATIENT
Start: 2018-03-10 | End: 2018-03-11

## 2018-03-10 RX ORDER — CEFUROXIME AXETIL 250 MG
1500 TABLET ORAL ONCE
Qty: 0 | Refills: 0 | Status: DISCONTINUED | OUTPATIENT
Start: 2018-03-10 | End: 2018-03-11

## 2018-03-10 RX ADMIN — Medication 1 INCH(S): at 03:12

## 2018-03-10 RX ADMIN — Medication 25 MICROGRAM(S): at 06:15

## 2018-03-10 RX ADMIN — Medication 81 MILLIGRAM(S): at 12:58

## 2018-03-10 RX ADMIN — Medication 1 INCH(S): at 21:23

## 2018-03-10 RX ADMIN — Medication 1 INCH(S): at 02:55

## 2018-03-10 RX ADMIN — CHLORHEXIDINE GLUCONATE 1 APPLICATION(S): 213 SOLUTION TOPICAL at 00:00

## 2018-03-10 RX ADMIN — AMLODIPINE BESYLATE 10 MILLIGRAM(S): 2.5 TABLET ORAL at 06:15

## 2018-03-10 RX ADMIN — Medication 1 INCH(S): at 16:54

## 2018-03-10 RX ADMIN — FENTANYL CITRATE 50 MICROGRAM(S): 50 INJECTION INTRAVENOUS at 19:15

## 2018-03-10 RX ADMIN — ATORVASTATIN CALCIUM 40 MILLIGRAM(S): 80 TABLET, FILM COATED ORAL at 21:23

## 2018-03-10 RX ADMIN — Medication 50 MILLIGRAM(S): at 06:15

## 2018-03-10 RX ADMIN — FENTANYL CITRATE 50 MICROGRAM(S): 50 INJECTION INTRAVENOUS at 19:00

## 2018-03-10 RX ADMIN — Medication 1 INCH(S): at 09:22

## 2018-03-10 RX ADMIN — CHLORHEXIDINE GLUCONATE 15 MILLILITER(S): 213 SOLUTION TOPICAL at 06:15

## 2018-03-10 RX ADMIN — Medication 50 MILLIGRAM(S): at 14:47

## 2018-03-10 RX ADMIN — Medication 1 INCH(S): at 09:13

## 2018-03-10 RX ADMIN — Medication 50 MILLIGRAM(S): at 21:23

## 2018-03-10 NOTE — PROGRESS NOTE ADULT - SUBJECTIVE AND OBJECTIVE BOX
Chief complaint  Patient is a 53y old  Female who presents with a chief complaint of I need heart surgery (08 Mar 2018 17:32)   Review of systems  Patient in bed, looks comfortable, no fever, no hypoglycemia.    Labs and Fingersticks  CAPILLARY BLOOD GLUCOSE  178 (10 Mar 2018 15:00)  167 (10 Mar 2018 14:00)  191 (10 Mar 2018 13:00)  98 (10 Mar 2018 12:00)  221 (10 Mar 2018 11:00)  275 (10 Mar 2018 10:00)  224 (10 Mar 2018 09:00)  128 (10 Mar 2018 08:30)  98 (10 Mar 2018 08:00)  128 (10 Mar 2018 07:00)  114 (10 Mar 2018 06:00)  114 (10 Mar 2018 05:00)  120 (10 Mar 2018 04:00)  115 (10 Mar 2018 03:00)  105 (10 Mar 2018 02:00)  120 (10 Mar 2018 01:00)  114 (10 Mar 2018 00:00)  122 (09 Mar 2018 23:00)  122 (09 Mar 2018 22:00)  141 (09 Mar 2018 21:00)  222 (09 Mar 2018 20:00)  179 (09 Mar 2018 19:00)      POCT Blood Glucose.: 178 mg/dL (10 Mar 2018 15:29)  POCT Blood Glucose.: 167 mg/dL (10 Mar 2018 14:44)  POCT Blood Glucose.: 191 mg/dL (10 Mar 2018 13:54)  POCT Blood Glucose.: 96 mg/dL (10 Mar 2018 12:45)  POCT Blood Glucose.: 221 mg/dL (10 Mar 2018 11:28)  POCT Blood Glucose.: 275 mg/dL (10 Mar 2018 10:32)  POCT Blood Glucose.: 224 mg/dL (10 Mar 2018 09:28)  POCT Blood Glucose.: 128 mg/dL (10 Mar 2018 08:33)  POCT Blood Glucose.: 98 mg/dL (10 Mar 2018 08:00)  POCT Blood Glucose.: 128 mg/dL (10 Mar 2018 06:55)  POCT Blood Glucose.: 114 mg/dL (10 Mar 2018 06:13)  POCT Blood Glucose.: 114 mg/dL (10 Mar 2018 05:11)  POCT Blood Glucose.: 120 mg/dL (10 Mar 2018 04:12)  POCT Blood Glucose.: 115 mg/dL (10 Mar 2018 03:10)  POCT Blood Glucose.: 106 mg/dL (10 Mar 2018 02:09)  POCT Blood Glucose.: 120 mg/dL (10 Mar 2018 01:23)  POCT Blood Glucose.: 114 mg/dL (10 Mar 2018 00:22)  POCT Blood Glucose.: 122 mg/dL (09 Mar 2018 23:18)  POCT Blood Glucose.: 122 mg/dL (09 Mar 2018 22:09)  POCT Blood Glucose.: 141 mg/dL (09 Mar 2018 21:01)  POCT Blood Glucose.: 222 mg/dL (09 Mar 2018 20:02)  POCT Blood Glucose.: 179 mg/dL (09 Mar 2018 18:42)  POCT Blood Glucose.: 122 mg/dL (09 Mar 2018 17:59)  POCT Blood Glucose.: 118 mg/dL (09 Mar 2018 17:01)  POCT Blood Glucose.: 150 mg/dL (09 Mar 2018 15:57)      Anion Gap, Serum: 12 (03-10 @ 01:26)  Anion Gap, Serum: 11 (03-09 @ 02:21)  Anion Gap, Serum: 13 (03-08 @ 18:34)      Calcium, Total Serum: 8.8 (03-10 @ 01:26)  Calcium, Total Serum: 9.1 (03-09 @ 02:21)  Calcium, Total Serum: 9.0 (03-08 @ 18:34)  Albumin, Serum: 3.4 (03-10 @ 01:26)  Albumin, Serum: 3.5 (03-08 @ 18:34)    Alanine Aminotransferase (ALT/SGPT): 34 (03-10 @ 01:26)  Alanine Aminotransferase (ALT/SGPT): 47 <H> (03-08 @ 18:34)  Alkaline Phosphatase, Serum: 50 (03-10 @ 01:26)  Alkaline Phosphatase, Serum: 54 (03-08 @ 18:34)  Aspartate Aminotransferase (AST/SGOT): 36 (03-10 @ 01:26)  Aspartate Aminotransferase (AST/SGOT): 55 <H> (03-08 @ 18:34)        03-10    140  |  102  |  14  ----------------------------<  119<H>  3.9   |  26  |  0.71    Ca    8.8      10 Mar 2018 01:26  Phos  4.0     03-10  Mg     2.0     03-10    TPro  7.0  /  Alb  3.4  /  TBili  0.5  /  DBili  x   /  AST  36  /  ALT  34  /  AlkPhos  50  03-10                        11.2   12.9  )-----------( 221      ( 10 Mar 2018 01:26 )             32.3     Medications  MEDICATIONS  (STANDING):  amLODIPine   Tablet 10 milliGRAM(s) Oral daily  aspirin enteric coated 81 milliGRAM(s) Oral daily  atorvastatin 40 milliGRAM(s) Oral at bedtime  cefuroxime  IVPB 1500 milliGRAM(s) IV Intermittent once  chlorhexidine 4% Liquid 1 Application(s) Topical daily  dextrose 5%. 1000 milliLiter(s) (15 mL/Hr) IV Continuous <Continuous>  heparin  Infusion 900 Unit(s)/Hr (12.5 mL/Hr) IV Continuous <Continuous>  influenza   Vaccine 0.5 milliLiter(s) IntraMuscular once  insulin Infusion 3 Unit(s)/Hr (3 mL/Hr) IV Continuous <Continuous>  levothyroxine 25 MICROGram(s) Oral daily  metoprolol     tartrate 50 milliGRAM(s) Oral every 8 hours  nitroglycerin    2% Ointment 1 Inch(s) Transdermal every 6 hours      Physical Exam  General: Patient comfortable in bed  Vital Signs Last 12 Hrs  T(F): 98.1 (03-10-18 @ 12:00), Max: 98.1 (03-10-18 @ 12:00)  HR: 78 (03-10-18 @ 15:00) (63 - 83)  BP: 153/74 (03-10-18 @ 15:00) (119/57 - 156/81)  BP(mean): 105 (03-10-18 @ 15:00) (77 - 111)  RR: 23 (03-10-18 @ 15:00) (17 - 44)  SpO2: 100% (03-10-18 @ 15:00) (95% - 100%)  Neck: No palpable thyroid nodules.  CVS: S1S2, No murmurs  Respiratory: No wheezing, no crepitations  GI: Abdomen soft, bowel sounds positive  Musculoskeletal:  edema lower extremities.   Skin: No skin rashes, no ecchymosis    Diagnostics

## 2018-03-10 NOTE — PROGRESS NOTE ADULT - SUBJECTIVE AND OBJECTIVE BOX
6pm-6:30 pm    Remained in ICU, ongoing management for CAD, Hyperglycemia.     OBJECTIVE:  ICU Vital Signs Last 24 Hrs  T(C): 36.8 (10 Mar 2018 16:00), Max: 37 (09 Mar 2018 18:26)  T(F): 98.3 (10 Mar 2018 16:00), Max: 98.6 (09 Mar 2018 18:26)  HR: 66 (10 Mar 2018 17:00) (63 - 96)  BP: 140/72 (10 Mar 2018 17:00) (119/57 - 157/76)  BP(mean): 99 (10 Mar 2018 17:00) (77 - 119)  ABP: --  ABP(mean): --  RR: 34 (10 Mar 2018 17:00) (17 - 44)  SpO2: 99% (10 Mar 2018 17:00) (95% - 100%)         @ 07:01  -  10 @ 07:00  --------------------------------------------------------  IN: 666.5 mL / OUT: 1200 mL / NET: -533.5 mL    03-10 @ 06:01  -  03-10 @ 18:04  --------------------------------------------------------  IN: 27.5 mL / OUT: 300 mL / NET: -272.5 mL      CAPILLARY BLOOD GLUCOSE  120 (10 Mar 2018 17:00)      POCT Blood Glucose.: 120 mg/dL (10 Mar 2018 16:56)      PHYSICAL EXAM:Daily     Daily Weight in k.9 (10 Mar 2018 00:00)  General: WN/WD NAD  Neurology: A&Ox3, nonfocal, MAYORGA x 4  Eyes: PERRLA/ EOMI, Gross vision intact  ENT/Neck: Neck supple, trachea midline, No JVD, Gross hearing intact  Respiratory: CTA B/L, No wheezing, rales, rhonchi  CV: RRR, S1S2, no murmurs, rubs or gallops  Abdominal: Soft, NT, ND +BS,   Extremities: No edema, + peripheral pulses  Skin: No Rashes, Hematoma, Ecchymosis           HOSPITAL MEDICATIONS:  MEDICATIONS  (STANDING):  amLODIPine   Tablet 10 milliGRAM(s) Oral daily  aspirin enteric coated 81 milliGRAM(s) Oral daily  atorvastatin 40 milliGRAM(s) Oral at bedtime  cefuroxime  IVPB 1500 milliGRAM(s) IV Intermittent once  chlorhexidine 4% Liquid 1 Application(s) Topical once  dextrose 5%. 1000 milliLiter(s) (15 mL/Hr) IV Continuous <Continuous>  heparin  Infusion 900 Unit(s)/Hr (12.5 mL/Hr) IV Continuous <Continuous>  influenza   Vaccine 0.5 milliLiter(s) IntraMuscular once  insulin Infusion 3 Unit(s)/Hr (3 mL/Hr) IV Continuous <Continuous>  levothyroxine 25 MICROGram(s) Oral daily  metoprolol     tartrate 50 milliGRAM(s) Oral every 8 hours  nitroglycerin    2% Ointment 1 Inch(s) Transdermal every 6 hours    MEDICATIONS  (PRN):      LABS:                        11.2   12.9  )-----------( 221      ( 10 Mar 2018 01:26 )             32.3     03-10    140  |  102  |  14  ----------------------------<  119<H>  3.9   |  26  |  0.71    Ca    8.8      10 Mar 2018 01:26  Phos  4.0     03-10  Mg     2.0     03-10    TPro  7.0  /  Alb  3.4  /  TBili  0.5  /  DBili  x   /  AST  36  /  ALT  34  /  AlkPhos  50  03-10    PT/INR - ( 10 Mar 2018 01:26 )   PT: 11.6 sec;   INR: 1.07 ratio         PTT - ( 10 Mar 2018 01:26 )  PTT:67.9 sec  Urinalysis Basic - ( 08 Mar 2018 18:34 )    Color: Yellow / Appearance: Clear / S.018 / pH: x  Gluc: x / Ketone: Negative  / Bili: Negative / Urobili: Negative   Blood: x / Protein: 150 mg/dL / Nitrite: Negative   Leuk Esterase: Negative / RBC: 25-50 /HPF / WBC 2-5 /HPF   Sq Epi: x / Non Sq Epi: Occasional /HPF / Bacteria: x            LIVER FUNCTIONS - ( 10 Mar 2018 01:26 )  Alb: 3.4 g/dL / Pro: 7.0 g/dL / ALK PHOS: 50 U/L / ALT: 34 U/L RC / AST: 36 U/L / GGT: x           Urinalysis Basic - ( 08 Mar 2018 18:34 )    Color: Yellow / Appearance: Clear / S.018 / pH: x  Gluc: x / Ketone: Negative  / Bili: Negative / Urobili: Negative   Blood: x / Protein: 150 mg/dL / Nitrite: Negative   Leuk Esterase: Negative / RBC: 25-50 /HPF / WBC 2-5 /HPF   Sq Epi: x / Non Sq Epi: Occasional /HPF / Bacteria: x    MICROBIOLOGY:     RADIOLOGY:  X Reviewed and interpreted by me

## 2018-03-11 ENCOUNTER — APPOINTMENT (OUTPATIENT)
Dept: CARDIOTHORACIC SURGERY | Facility: HOSPITAL | Age: 53
End: 2018-03-11

## 2018-03-11 LAB
ALBUMIN SERPL ELPH-MCNC: 2.1 G/DL — LOW (ref 3.3–5)
ALBUMIN SERPL ELPH-MCNC: 3.1 G/DL — LOW (ref 3.3–5)
ALP SERPL-CCNC: 31 U/L — LOW (ref 40–120)
ALP SERPL-CCNC: 50 U/L — SIGNIFICANT CHANGE UP (ref 40–120)
ALT FLD-CCNC: 27 U/L RC — SIGNIFICANT CHANGE UP (ref 10–45)
ALT FLD-CCNC: 38 U/L RC — SIGNIFICANT CHANGE UP (ref 10–45)
ANION GAP SERPL CALC-SCNC: 13 MMOL/L — SIGNIFICANT CHANGE UP (ref 5–17)
ANION GAP SERPL CALC-SCNC: 13 MMOL/L — SIGNIFICANT CHANGE UP (ref 5–17)
APTT BLD: 34.2 SEC — SIGNIFICANT CHANGE UP (ref 27.5–37.4)
APTT BLD: 68.4 SEC — HIGH (ref 27.5–37.4)
AST SERPL-CCNC: 36 U/L — SIGNIFICANT CHANGE UP (ref 10–40)
AST SERPL-CCNC: 70 U/L — HIGH (ref 10–40)
BILIRUB SERPL-MCNC: 0.5 MG/DL — SIGNIFICANT CHANGE UP (ref 0.2–1.2)
BILIRUB SERPL-MCNC: 0.7 MG/DL — SIGNIFICANT CHANGE UP (ref 0.2–1.2)
BUN SERPL-MCNC: 11 MG/DL — SIGNIFICANT CHANGE UP (ref 7–23)
BUN SERPL-MCNC: 9 MG/DL — SIGNIFICANT CHANGE UP (ref 7–23)
CALCIUM SERPL-MCNC: 9.1 MG/DL — SIGNIFICANT CHANGE UP (ref 8.4–10.5)
CALCIUM SERPL-MCNC: 9.1 MG/DL — SIGNIFICANT CHANGE UP (ref 8.4–10.5)
CHLORIDE SERPL-SCNC: 103 MMOL/L — SIGNIFICANT CHANGE UP (ref 96–108)
CHLORIDE SERPL-SCNC: 107 MMOL/L — SIGNIFICANT CHANGE UP (ref 96–108)
CK MB BLD-MCNC: 10.8 % — HIGH (ref 0–3.5)
CK MB CFR SERPL CALC: 44.5 NG/ML — HIGH (ref 0–3.8)
CK SERPL-CCNC: 411 U/L — HIGH (ref 25–170)
CO2 SERPL-SCNC: 22 MMOL/L — SIGNIFICANT CHANGE UP (ref 22–31)
CO2 SERPL-SCNC: 22 MMOL/L — SIGNIFICANT CHANGE UP (ref 22–31)
CREAT SERPL-MCNC: 0.69 MG/DL — SIGNIFICANT CHANGE UP (ref 0.5–1.3)
CREAT SERPL-MCNC: 0.71 MG/DL — SIGNIFICANT CHANGE UP (ref 0.5–1.3)
FIBRINOGEN PPP-MCNC: 266 MG/DL — LOW (ref 310–510)
GAS PNL BLDA: SIGNIFICANT CHANGE UP
GLUCOSE BLDC GLUCOMTR-MCNC: 100 MG/DL — HIGH (ref 70–99)
GLUCOSE BLDC GLUCOMTR-MCNC: 105 MG/DL — HIGH (ref 70–99)
GLUCOSE BLDC GLUCOMTR-MCNC: 133 MG/DL — HIGH (ref 70–99)
GLUCOSE BLDC GLUCOMTR-MCNC: 137 MG/DL — HIGH (ref 70–99)
GLUCOSE BLDC GLUCOMTR-MCNC: 165 MG/DL — HIGH (ref 70–99)
GLUCOSE BLDC GLUCOMTR-MCNC: 181 MG/DL — HIGH (ref 70–99)
GLUCOSE SERPL-MCNC: 161 MG/DL — HIGH (ref 70–99)
GLUCOSE SERPL-MCNC: 178 MG/DL — HIGH (ref 70–99)
HCT VFR BLD CALC: 31.5 % — LOW (ref 34.5–45)
HCT VFR BLD CALC: 35 % — SIGNIFICANT CHANGE UP (ref 34.5–45)
HGB BLD-MCNC: 10.9 G/DL — LOW (ref 11.5–15.5)
HGB BLD-MCNC: 11.5 G/DL — SIGNIFICANT CHANGE UP (ref 11.5–15.5)
INR BLD: 1.33 RATIO — HIGH (ref 0.88–1.16)
MCHC RBC-ENTMCNC: 29.1 PG — SIGNIFICANT CHANGE UP (ref 27–34)
MCHC RBC-ENTMCNC: 30.7 PG — SIGNIFICANT CHANGE UP (ref 27–34)
MCHC RBC-ENTMCNC: 33 GM/DL — SIGNIFICANT CHANGE UP (ref 32–36)
MCHC RBC-ENTMCNC: 34.7 GM/DL — SIGNIFICANT CHANGE UP (ref 32–36)
MCV RBC AUTO: 88.2 FL — SIGNIFICANT CHANGE UP (ref 80–100)
MCV RBC AUTO: 88.6 FL — SIGNIFICANT CHANGE UP (ref 80–100)
PLATELET # BLD AUTO: 165 K/UL — SIGNIFICANT CHANGE UP (ref 150–400)
PLATELET # BLD AUTO: 208 K/UL — SIGNIFICANT CHANGE UP (ref 150–400)
POTASSIUM SERPL-MCNC: 3.8 MMOL/L — SIGNIFICANT CHANGE UP (ref 3.5–5.3)
POTASSIUM SERPL-MCNC: 5.1 MMOL/L — SIGNIFICANT CHANGE UP (ref 3.5–5.3)
POTASSIUM SERPL-SCNC: 3.8 MMOL/L — SIGNIFICANT CHANGE UP (ref 3.5–5.3)
POTASSIUM SERPL-SCNC: 5.1 MMOL/L — SIGNIFICANT CHANGE UP (ref 3.5–5.3)
PROT SERPL-MCNC: 4 G/DL — LOW (ref 6–8.3)
PROT SERPL-MCNC: 6.9 G/DL — SIGNIFICANT CHANGE UP (ref 6–8.3)
PROTHROM AB SERPL-ACNC: 14.5 SEC — HIGH (ref 9.8–12.7)
RBC # BLD: 3.55 M/UL — LOW (ref 3.8–5.2)
RBC # BLD: 3.97 M/UL — SIGNIFICANT CHANGE UP (ref 3.8–5.2)
RBC # FLD: 12.6 % — SIGNIFICANT CHANGE UP (ref 10.3–14.5)
RBC # FLD: 13.8 % — SIGNIFICANT CHANGE UP (ref 10.3–14.5)
SODIUM SERPL-SCNC: 138 MMOL/L — SIGNIFICANT CHANGE UP (ref 135–145)
SODIUM SERPL-SCNC: 142 MMOL/L — SIGNIFICANT CHANGE UP (ref 135–145)
TROPONIN T SERPL-MCNC: 0.85 NG/ML — HIGH (ref 0–0.06)
WBC # BLD: 13.4 K/UL — HIGH (ref 3.8–10.5)
WBC # BLD: 33.1 K/UL — HIGH (ref 3.8–10.5)
WBC # FLD AUTO: 13.4 K/UL — HIGH (ref 3.8–10.5)
WBC # FLD AUTO: 33.1 K/UL — HIGH (ref 3.8–10.5)

## 2018-03-11 PROCEDURE — 33533 CABG ARTERIAL SINGLE: CPT | Mod: AS

## 2018-03-11 PROCEDURE — 33533 CABG ARTERIAL SINGLE: CPT

## 2018-03-11 PROCEDURE — 33519 CABG ARTERY-VEIN THREE: CPT | Mod: AS

## 2018-03-11 PROCEDURE — 71045 X-RAY EXAM CHEST 1 VIEW: CPT | Mod: 26

## 2018-03-11 PROCEDURE — 33519 CABG ARTERY-VEIN THREE: CPT

## 2018-03-11 PROCEDURE — 93010 ELECTROCARDIOGRAM REPORT: CPT

## 2018-03-11 RX ORDER — VASOPRESSIN 20 [USP'U]/ML
0.1 INJECTION INTRAVENOUS
Qty: 100 | Refills: 0 | Status: DISCONTINUED | OUTPATIENT
Start: 2018-03-11 | End: 2018-03-12

## 2018-03-11 RX ORDER — ALBUMIN HUMAN 25 %
250 VIAL (ML) INTRAVENOUS ONCE
Qty: 0 | Refills: 0 | Status: COMPLETED | OUTPATIENT
Start: 2018-03-11 | End: 2018-03-11

## 2018-03-11 RX ORDER — ACETAMINOPHEN 500 MG
650 TABLET ORAL ONCE
Qty: 0 | Refills: 0 | Status: COMPLETED | OUTPATIENT
Start: 2018-03-11 | End: 2018-03-11

## 2018-03-11 RX ORDER — CEFUROXIME AXETIL 250 MG
1500 TABLET ORAL EVERY 8 HOURS
Qty: 0 | Refills: 0 | Status: COMPLETED | OUTPATIENT
Start: 2018-03-11 | End: 2018-03-13

## 2018-03-11 RX ORDER — POTASSIUM CHLORIDE 20 MEQ
10 PACKET (EA) ORAL
Qty: 0 | Refills: 0 | Status: COMPLETED | OUTPATIENT
Start: 2018-03-11 | End: 2018-03-11

## 2018-03-11 RX ORDER — MEPERIDINE HYDROCHLORIDE 50 MG/ML
25 INJECTION INTRAMUSCULAR; INTRAVENOUS; SUBCUTANEOUS ONCE
Qty: 0 | Refills: 0 | Status: DISCONTINUED | OUTPATIENT
Start: 2018-03-11 | End: 2018-03-11

## 2018-03-11 RX ORDER — ACETAMINOPHEN 500 MG
1000 TABLET ORAL ONCE
Qty: 0 | Refills: 0 | Status: COMPLETED | OUTPATIENT
Start: 2018-03-11 | End: 2018-03-11

## 2018-03-11 RX ORDER — FENTANYL CITRATE 50 UG/ML
50 INJECTION INTRAVENOUS ONCE
Qty: 0 | Refills: 0 | Status: DISCONTINUED | OUTPATIENT
Start: 2018-03-11 | End: 2018-03-11

## 2018-03-11 RX ORDER — SODIUM CHLORIDE 9 MG/ML
500 INJECTION, SOLUTION INTRAVENOUS ONCE
Qty: 0 | Refills: 0 | Status: COMPLETED | OUTPATIENT
Start: 2018-03-11 | End: 2018-03-11

## 2018-03-11 RX ORDER — ALBUMIN HUMAN 25 %
250 VIAL (ML) INTRAVENOUS
Qty: 0 | Refills: 0 | Status: DISCONTINUED | OUTPATIENT
Start: 2018-03-11 | End: 2018-03-11

## 2018-03-11 RX ORDER — SODIUM CHLORIDE 9 MG/ML
250 INJECTION, SOLUTION INTRAVENOUS ONCE
Qty: 0 | Refills: 0 | Status: COMPLETED | OUTPATIENT
Start: 2018-03-11 | End: 2018-03-11

## 2018-03-11 RX ORDER — LEVOTHYROXINE SODIUM 125 MCG
25 TABLET ORAL DAILY
Qty: 0 | Refills: 0 | Status: DISCONTINUED | OUTPATIENT
Start: 2018-03-11 | End: 2018-03-17

## 2018-03-11 RX ORDER — PROPOFOL 10 MG/ML
5 INJECTION, EMULSION INTRAVENOUS
Qty: 500 | Refills: 0 | Status: DISCONTINUED | OUTPATIENT
Start: 2018-03-11 | End: 2018-03-11

## 2018-03-11 RX ORDER — LEVOTHYROXINE SODIUM 125 MCG
12.5 TABLET ORAL ONCE
Qty: 0 | Refills: 0 | Status: COMPLETED | OUTPATIENT
Start: 2018-03-11 | End: 2018-03-11

## 2018-03-11 RX ORDER — NOREPINEPHRINE BITARTRATE/D5W 8 MG/250ML
0.07 PLASTIC BAG, INJECTION (ML) INTRAVENOUS
Qty: 8 | Refills: 0 | Status: DISCONTINUED | OUTPATIENT
Start: 2018-03-11 | End: 2018-03-12

## 2018-03-11 RX ADMIN — Medication 100 MILLIGRAM(S): at 18:00

## 2018-03-11 RX ADMIN — SODIUM CHLORIDE 1000 MILLILITER(S): 9 INJECTION, SOLUTION INTRAVENOUS at 15:55

## 2018-03-11 RX ADMIN — Medication 12.5 MICROGRAM(S): at 05:59

## 2018-03-11 RX ADMIN — Medication 10 MICROGRAM(S)/KG/MIN: at 20:29

## 2018-03-11 RX ADMIN — Medication 1 INCH(S): at 04:00

## 2018-03-11 RX ADMIN — Medication 100 MILLIGRAM(S): at 23:10

## 2018-03-11 RX ADMIN — FENTANYL CITRATE 50 MICROGRAM(S): 50 INJECTION INTRAVENOUS at 23:10

## 2018-03-11 RX ADMIN — VASOPRESSIN 6 UNIT(S)/MIN: 20 INJECTION INTRAVENOUS at 20:29

## 2018-03-11 RX ADMIN — Medication 100 MILLIEQUIVALENT(S): at 18:28

## 2018-03-11 RX ADMIN — Medication 325 MILLIGRAM(S): at 23:10

## 2018-03-11 RX ADMIN — INSULIN HUMAN 2 UNIT(S)/HR: 100 INJECTION, SOLUTION SUBCUTANEOUS at 20:29

## 2018-03-11 RX ADMIN — SODIUM CHLORIDE 2000 MILLILITER(S): 9 INJECTION, SOLUTION INTRAVENOUS at 16:49

## 2018-03-11 RX ADMIN — HEPARIN SODIUM 12.5 UNIT(S)/HR: 5000 INJECTION INTRAVENOUS; SUBCUTANEOUS at 04:38

## 2018-03-11 RX ADMIN — FENTANYL CITRATE 50 MICROGRAM(S): 50 INJECTION INTRAVENOUS at 23:25

## 2018-03-11 RX ADMIN — CHLORHEXIDINE GLUCONATE 1 APPLICATION(S): 213 SOLUTION TOPICAL at 05:59

## 2018-03-11 RX ADMIN — Medication 1500 MILLILITER(S): at 20:28

## 2018-03-11 RX ADMIN — Medication 650 MILLIGRAM(S): at 01:45

## 2018-03-11 RX ADMIN — Medication 125 MILLILITER(S): at 20:54

## 2018-03-11 RX ADMIN — Medication 100 MILLIEQUIVALENT(S): at 18:00

## 2018-03-11 RX ADMIN — PANTOPRAZOLE SODIUM 40 MILLIGRAM(S): 20 TABLET, DELAYED RELEASE ORAL at 15:56

## 2018-03-11 RX ADMIN — Medication 400 MILLIGRAM(S): at 20:28

## 2018-03-11 RX ADMIN — Medication 650 MILLIGRAM(S): at 01:15

## 2018-03-11 NOTE — AIRWAY REMOVAL NOTE  ADULT & PEDS - ARTIFICAL AIRWAY REMOVAL COMMENTS
Written order for extubation verified. The patient was identified by full name and birth date compared to the identification band. Present during the procedure was ZACK Olivo.

## 2018-03-11 NOTE — PRE-OP CHECKLIST - HOW ADMINISTERED
See MAR for last dose taken
Metoprolol 25 mg/See MAR for last dose taken
See MAR for last dose taken/see above

## 2018-03-11 NOTE — PROGRESS NOTE ADULT - SUBJECTIVE AND OBJECTIVE BOX
Chief complaint  Patient is a 53y old  Female who presents with a chief complaint of I need heart surgery (08 Mar 2018 17:32)   Review of systems  Patient in bed, looks comfortable, intubated ,sedated,S/P CABG this AM  had no hypoglycemia.    Labs and Fingersticks  CAPILLARY BLOOD GLUCOSE  100 (11 Mar 2018 06:00)  105 (11 Mar 2018 05:00)  137 (11 Mar 2018 04:00)  167 (11 Mar 2018 03:00)  165 (11 Mar 2018 01:00)  181 (11 Mar 2018 00:00)  182 (10 Mar 2018 23:00)  164 (10 Mar 2018 22:00)  231 (10 Mar 2018 21:00)  202 (10 Mar 2018 20:00)  144 (10 Mar 2018 19:00)  267 (10 Mar 2018 18:00)  120 (10 Mar 2018 17:00)      POCT Blood Glucose.: 100 mg/dL (11 Mar 2018 05:58)  POCT Blood Glucose.: 105 mg/dL (11 Mar 2018 05:19)  POCT Blood Glucose.: 137 mg/dL (11 Mar 2018 04:26)  POCT Blood Glucose.: 165 mg/dL (11 Mar 2018 01:14)  POCT Blood Glucose.: 181 mg/dL (11 Mar 2018 00:23)  POCT Blood Glucose.: 182 mg/dL (10 Mar 2018 23:08)  POCT Blood Glucose.: 164 mg/dL (10 Mar 2018 22:22)  POCT Blood Glucose.: 231 mg/dL (10 Mar 2018 21:14)  POCT Blood Glucose.: 202 mg/dL (10 Mar 2018 20:21)  POCT Blood Glucose.: 144 mg/dL (10 Mar 2018 18:46)  POCT Blood Glucose.: 267 mg/dL (10 Mar 2018 18:07)  POCT Blood Glucose.: 120 mg/dL (10 Mar 2018 16:56)      Anion Gap, Serum: 13 (03-11 @ 03:11)  Anion Gap, Serum: 12 (03-10 @ 01:26)      Calcium, Total Serum: 9.1 (03-11 @ 03:11)  Calcium, Total Serum: 8.8 (03-10 @ 01:26)  Albumin, Serum: 3.1 <L> (03-11 @ 03:11)  Albumin, Serum: 3.4 (03-10 @ 01:26)    Alanine Aminotransferase (ALT/SGPT): 38 (03-11 @ 03:11)  Alanine Aminotransferase (ALT/SGPT): 34 (03-10 @ 01:26)  Alkaline Phosphatase, Serum: 50 (03-11 @ 03:11)  Alkaline Phosphatase, Serum: 50 (03-10 @ 01:26)  Aspartate Aminotransferase (AST/SGOT): 36 (03-11 @ 03:11)  Aspartate Aminotransferase (AST/SGOT): 36 (03-10 @ 01:26)        03-11    138  |  103  |  11  ----------------------------<  161<H>  3.8   |  22  |  0.69    Ca    9.1      11 Mar 2018 03:11  Phos  4.0     03-10  Mg     2.0     03-10    TPro  6.9  /  Alb  3.1<L>  /  TBili  0.5  /  DBili  x   /  AST  36  /  ALT  38  /  AlkPhos  50  03-11                        10.9   13.4  )-----------( 208      ( 11 Mar 2018 03:11 )             31.5     Medications  MEDICATIONS  (STANDING):  aspirin enteric coated 325 milliGRAM(s) Oral daily  cefuroxime  IVPB 1500 milliGRAM(s) IV Intermittent every 8 hours  dextrose 50% Injectable 50 milliLiter(s) IV Push every 15 minutes  dextrose 50% Injectable 25 milliLiter(s) IV Push every 15 minutes  docusate sodium 100 milliGRAM(s) Oral three times a day  influenza   Vaccine 0.5 milliLiter(s) IntraMuscular once  insulin Infusion 2 Unit(s)/Hr (2 mL/Hr) IV Continuous <Continuous>  lactated ringers Bolus 250 milliLiter(s) IV Bolus once  levothyroxine 25 MICROGram(s) Oral daily  norepinephrine Infusion 0.074 MICROgram(s)/kG/Min (10 mL/Hr) IV Continuous <Continuous>  pantoprazole  Injectable 40 milliGRAM(s) IV Push daily  potassium chloride  10 mEq/50 mL IVPB 10 milliEquivalent(s) IV Intermittent every 1 hour  potassium chloride  10 mEq/50 mL IVPB 10 milliEquivalent(s) IV Intermittent every 1 hour  potassium chloride  10 mEq/50 mL IVPB 10 milliEquivalent(s) IV Intermittent once  propofol Infusion 5 MICROgram(s)/kG/Min (2.169 mL/Hr) IV Continuous <Continuous>  sodium chloride 0.45%. 1000 milliLiter(s) (10 mL/Hr) IV Continuous <Continuous>  vasopressin Infusion 0.1 Unit(s)/Min (6 mL/Hr) IV Continuous <Continuous>      Physical Exam  General: Patient comfortable in bed, intubated  Vital Signs Last 12 Hrs  T(F): 97.8 (03-11-18 @ 07:26), Max: 97.8 (03-11-18 @ 07:26)  HR: 85 (03-11-18 @ 15:51) (62 - 85)  BP: 173/73 (03-11-18 @ 07:26) (131/64 - 173/73)  BP(mean): 112 (03-11-18 @ 06:00) (91 - 112)  RR: 19 (03-11-18 @ 07:45) (16 - 36)  SpO2: 100% (03-11-18 @ 15:51) (96% - 100%)  Neck: No palpable thyroid nodules.  CVS: S1S2, No murmurs  Respiratory: No wheezing, no crepitations  GI: Abdomen soft, bowel sounds positive  Musculoskeletal:  edema lower extremities.   Skin: No skin rashes, no ecchymosis    Diagnostics

## 2018-03-11 NOTE — BRIEF OPERATIVE NOTE - COMMENTS
*** ESTIMATED BLOOD LOSS NOT APPLICABLE DUE TO USE OF CARDIOTOMY AND CELL SAVER SUCTION *** *** ESTIMATED BLOOD LOSS NOT APPLICABLE DUE TO USE OF CARDIOTOMY AND CELL SAVER SUCTION ***  Aortic Cross Clamp Time: 134 minutes

## 2018-03-11 NOTE — BRIEF OPERATIVE NOTE - PROCEDURE
<<-----Click on this checkbox to enter Procedure CABG (coronary artery bypass graft)  03/11/2018  CABG x 4 with LIMA  LIMA to LAD  SVG to OM1  SVG to OM2  SVG to PDA  Active  [id number]

## 2018-03-12 LAB
ALBUMIN SERPL ELPH-MCNC: 2.9 G/DL — LOW (ref 3.3–5)
ALP SERPL-CCNC: 27 U/L — LOW (ref 40–120)
ALT FLD-CCNC: 30 U/L RC — SIGNIFICANT CHANGE UP (ref 10–45)
ANION GAP SERPL CALC-SCNC: 12 MMOL/L — SIGNIFICANT CHANGE UP (ref 5–17)
AST SERPL-CCNC: 105 U/L — HIGH (ref 10–40)
BASE EXCESS BLDV CALC-SCNC: -0.4 MMOL/L — SIGNIFICANT CHANGE UP (ref -2–2)
BASE EXCESS BLDV CALC-SCNC: -2 MMOL/L — SIGNIFICANT CHANGE UP (ref -2–2)
BILIRUB SERPL-MCNC: 0.9 MG/DL — SIGNIFICANT CHANGE UP (ref 0.2–1.2)
BUN SERPL-MCNC: 12 MG/DL — SIGNIFICANT CHANGE UP (ref 7–23)
CA-I SERPL-SCNC: 1.12 MMOL/L — SIGNIFICANT CHANGE UP (ref 1.12–1.3)
CALCIUM SERPL-MCNC: 8.5 MG/DL — SIGNIFICANT CHANGE UP (ref 8.4–10.5)
CHLORIDE BLDV-SCNC: 106 MMOL/L — SIGNIFICANT CHANGE UP (ref 96–108)
CHLORIDE SERPL-SCNC: 109 MMOL/L — HIGH (ref 96–108)
CK MB BLD-MCNC: 8.5 % — HIGH (ref 0–3.5)
CK MB CFR SERPL CALC: 75.3 NG/ML — HIGH (ref 0–3.8)
CK SERPL-CCNC: 884 U/L — HIGH (ref 25–170)
CO2 BLDV-SCNC: 24 MMOL/L — SIGNIFICANT CHANGE UP (ref 22–30)
CO2 BLDV-SCNC: 26 MMOL/L — SIGNIFICANT CHANGE UP (ref 22–30)
CO2 SERPL-SCNC: 22 MMOL/L — SIGNIFICANT CHANGE UP (ref 22–31)
CREAT SERPL-MCNC: 0.98 MG/DL — SIGNIFICANT CHANGE UP (ref 0.5–1.3)
GAS PNL BLDA: SIGNIFICANT CHANGE UP
GAS PNL BLDV: 139 MMOL/L — SIGNIFICANT CHANGE UP (ref 136–145)
GAS PNL BLDV: SIGNIFICANT CHANGE UP
GLUCOSE BLDC GLUCOMTR-MCNC: 106 MG/DL — HIGH (ref 70–99)
GLUCOSE BLDC GLUCOMTR-MCNC: 108 MG/DL — HIGH (ref 70–99)
GLUCOSE BLDC GLUCOMTR-MCNC: 111 MG/DL — HIGH (ref 70–99)
GLUCOSE BLDC GLUCOMTR-MCNC: 113 MG/DL — HIGH (ref 70–99)
GLUCOSE BLDC GLUCOMTR-MCNC: 115 MG/DL — HIGH (ref 70–99)
GLUCOSE BLDC GLUCOMTR-MCNC: 116 MG/DL — HIGH (ref 70–99)
GLUCOSE BLDC GLUCOMTR-MCNC: 117 MG/DL — HIGH (ref 70–99)
GLUCOSE BLDC GLUCOMTR-MCNC: 123 MG/DL — HIGH (ref 70–99)
GLUCOSE BLDC GLUCOMTR-MCNC: 125 MG/DL — HIGH (ref 70–99)
GLUCOSE BLDC GLUCOMTR-MCNC: 125 MG/DL — HIGH (ref 70–99)
GLUCOSE BLDC GLUCOMTR-MCNC: 126 MG/DL — HIGH (ref 70–99)
GLUCOSE BLDC GLUCOMTR-MCNC: 127 MG/DL — HIGH (ref 70–99)
GLUCOSE BLDC GLUCOMTR-MCNC: 127 MG/DL — HIGH (ref 70–99)
GLUCOSE BLDC GLUCOMTR-MCNC: 132 MG/DL — HIGH (ref 70–99)
GLUCOSE BLDC GLUCOMTR-MCNC: 133 MG/DL — HIGH (ref 70–99)
GLUCOSE BLDC GLUCOMTR-MCNC: 99 MG/DL — SIGNIFICANT CHANGE UP (ref 70–99)
GLUCOSE BLDV-MCNC: 98 MG/DL — SIGNIFICANT CHANGE UP (ref 70–99)
GLUCOSE SERPL-MCNC: 129 MG/DL — HIGH (ref 70–99)
HCO3 BLDV-SCNC: 23 MMOL/L — SIGNIFICANT CHANGE UP (ref 21–29)
HCO3 BLDV-SCNC: 25 MMOL/L — SIGNIFICANT CHANGE UP (ref 21–29)
HCT VFR BLD CALC: 27.9 % — LOW (ref 34.5–45)
HCT VFR BLDA CALC: 25 % — LOW (ref 39–50)
HGB BLD CALC-MCNC: 7.9 G/DL — LOW (ref 11.5–15.5)
HGB BLD-MCNC: 9.7 G/DL — LOW (ref 11.5–15.5)
HOROWITZ INDEX BLDV+IHG-RTO: 36 — SIGNIFICANT CHANGE UP
HOROWITZ INDEX BLDV+IHG-RTO: 36 — SIGNIFICANT CHANGE UP
LACTATE BLDV-MCNC: 1.3 MMOL/L — SIGNIFICANT CHANGE UP (ref 0.7–2)
MAGNESIUM SERPL-MCNC: 2.1 MG/DL — SIGNIFICANT CHANGE UP (ref 1.6–2.6)
MCHC RBC-ENTMCNC: 30.6 PG — SIGNIFICANT CHANGE UP (ref 27–34)
MCHC RBC-ENTMCNC: 34.9 GM/DL — SIGNIFICANT CHANGE UP (ref 32–36)
MCV RBC AUTO: 87.7 FL — SIGNIFICANT CHANGE UP (ref 80–100)
NIGHT BLUE STAIN TISS: SIGNIFICANT CHANGE UP
OTHER CELLS CSF MANUAL: 5 ML/DL — LOW (ref 18–22)
PCO2 BLDV: 43 MMHG — SIGNIFICANT CHANGE UP (ref 35–50)
PCO2 BLDV: 46 MMHG — SIGNIFICANT CHANGE UP (ref 35–50)
PH BLDV: 7.34 — LOW (ref 7.35–7.45)
PH BLDV: 7.35 — SIGNIFICANT CHANGE UP (ref 7.35–7.45)
PHOSPHATE SERPL-MCNC: 5.4 MG/DL — HIGH (ref 2.5–4.5)
PLATELET # BLD AUTO: 155 K/UL — SIGNIFICANT CHANGE UP (ref 150–400)
PO2 BLDV: 30 MMHG — SIGNIFICANT CHANGE UP (ref 25–45)
PO2 BLDV: 33 MMHG — SIGNIFICANT CHANGE UP (ref 25–45)
POTASSIUM BLDV-SCNC: 4.7 MMOL/L — SIGNIFICANT CHANGE UP (ref 3.5–5)
POTASSIUM SERPL-MCNC: 5.2 MMOL/L — SIGNIFICANT CHANGE UP (ref 3.5–5.3)
POTASSIUM SERPL-SCNC: 5.2 MMOL/L — SIGNIFICANT CHANGE UP (ref 3.5–5.3)
PROT SERPL-MCNC: 5.3 G/DL — LOW (ref 6–8.3)
RBC # BLD: 3.18 M/UL — LOW (ref 3.8–5.2)
RBC # FLD: 14.2 % — SIGNIFICANT CHANGE UP (ref 10.3–14.5)
SAO2 % BLDV: 50 % — LOW (ref 67–88)
SAO2 % BLDV: 54 % — LOW (ref 67–88)
SODIUM SERPL-SCNC: 143 MMOL/L — SIGNIFICANT CHANGE UP (ref 135–145)
SPECIMEN SOURCE: SIGNIFICANT CHANGE UP
T4 FREE SERPL-MCNC: 1.3 NG/DL — SIGNIFICANT CHANGE UP (ref 0.9–1.8)
TROPONIN T SERPL-MCNC: 1.82 NG/ML — HIGH (ref 0–0.06)
WBC # BLD: 23.2 K/UL — HIGH (ref 3.8–10.5)
WBC # FLD AUTO: 23.2 K/UL — HIGH (ref 3.8–10.5)

## 2018-03-12 PROCEDURE — 93010 ELECTROCARDIOGRAM REPORT: CPT

## 2018-03-12 PROCEDURE — 71045 X-RAY EXAM CHEST 1 VIEW: CPT | Mod: 26

## 2018-03-12 PROCEDURE — 99233 SBSQ HOSP IP/OBS HIGH 50: CPT

## 2018-03-12 RX ORDER — ATORVASTATIN CALCIUM 80 MG/1
40 TABLET, FILM COATED ORAL AT BEDTIME
Qty: 0 | Refills: 0 | Status: DISCONTINUED | OUTPATIENT
Start: 2018-03-12 | End: 2018-03-17

## 2018-03-12 RX ORDER — CLOPIDOGREL BISULFATE 75 MG/1
75 TABLET, FILM COATED ORAL DAILY
Qty: 0 | Refills: 0 | Status: DISCONTINUED | OUTPATIENT
Start: 2018-03-12 | End: 2018-03-17

## 2018-03-12 RX ORDER — FENTANYL CITRATE 50 UG/ML
25 INJECTION INTRAVENOUS ONCE
Qty: 0 | Refills: 0 | Status: DISCONTINUED | OUTPATIENT
Start: 2018-03-12 | End: 2018-03-12

## 2018-03-12 RX ORDER — ALBUMIN HUMAN 25 %
250 VIAL (ML) INTRAVENOUS
Qty: 0 | Refills: 0 | Status: COMPLETED | OUTPATIENT
Start: 2018-03-12 | End: 2018-03-12

## 2018-03-12 RX ORDER — OXYCODONE AND ACETAMINOPHEN 5; 325 MG/1; MG/1
1 TABLET ORAL EVERY 4 HOURS
Qty: 0 | Refills: 0 | Status: DISCONTINUED | OUTPATIENT
Start: 2018-03-12 | End: 2018-03-17

## 2018-03-12 RX ORDER — FUROSEMIDE 40 MG
10 TABLET ORAL ONCE
Qty: 0 | Refills: 0 | Status: COMPLETED | OUTPATIENT
Start: 2018-03-12 | End: 2018-03-12

## 2018-03-12 RX ORDER — ATORVASTATIN CALCIUM 80 MG/1
80 TABLET, FILM COATED ORAL AT BEDTIME
Qty: 0 | Refills: 0 | Status: DISCONTINUED | OUTPATIENT
Start: 2018-03-12 | End: 2018-03-12

## 2018-03-12 RX ORDER — HYDROMORPHONE HYDROCHLORIDE 2 MG/ML
0.5 INJECTION INTRAMUSCULAR; INTRAVENOUS; SUBCUTANEOUS ONCE
Qty: 0 | Refills: 0 | Status: DISCONTINUED | OUTPATIENT
Start: 2018-03-12 | End: 2018-03-12

## 2018-03-12 RX ORDER — PANTOPRAZOLE SODIUM 20 MG/1
40 TABLET, DELAYED RELEASE ORAL
Qty: 0 | Refills: 0 | Status: DISCONTINUED | OUTPATIENT
Start: 2018-03-12 | End: 2018-03-17

## 2018-03-12 RX ORDER — FUROSEMIDE 40 MG
20 TABLET ORAL ONCE
Qty: 0 | Refills: 0 | Status: COMPLETED | OUTPATIENT
Start: 2018-03-12 | End: 2018-03-12

## 2018-03-12 RX ORDER — HEPARIN SODIUM 5000 [USP'U]/ML
5000 INJECTION INTRAVENOUS; SUBCUTANEOUS EVERY 8 HOURS
Qty: 0 | Refills: 0 | Status: DISCONTINUED | OUTPATIENT
Start: 2018-03-12 | End: 2018-03-17

## 2018-03-12 RX ORDER — DOBUTAMINE HCL 250MG/20ML
1 VIAL (ML) INTRAVENOUS
Qty: 500 | Refills: 0 | Status: DISCONTINUED | OUTPATIENT
Start: 2018-03-12 | End: 2018-03-12

## 2018-03-12 RX ORDER — ASPIRIN/CALCIUM CARB/MAGNESIUM 324 MG
81 TABLET ORAL DAILY
Qty: 0 | Refills: 0 | Status: DISCONTINUED | OUTPATIENT
Start: 2018-03-12 | End: 2018-03-17

## 2018-03-12 RX ORDER — OXYCODONE AND ACETAMINOPHEN 5; 325 MG/1; MG/1
2 TABLET ORAL EVERY 6 HOURS
Qty: 0 | Refills: 0 | Status: DISCONTINUED | OUTPATIENT
Start: 2018-03-12 | End: 2018-03-15

## 2018-03-12 RX ORDER — ALBUMIN HUMAN 25 %
250 VIAL (ML) INTRAVENOUS ONCE
Qty: 0 | Refills: 0 | Status: COMPLETED | OUTPATIENT
Start: 2018-03-12 | End: 2018-03-12

## 2018-03-12 RX ORDER — FUROSEMIDE 40 MG
40 TABLET ORAL ONCE
Qty: 0 | Refills: 0 | Status: COMPLETED | OUTPATIENT
Start: 2018-03-12 | End: 2018-03-12

## 2018-03-12 RX ADMIN — Medication 100 MILLIGRAM(S): at 09:03

## 2018-03-12 RX ADMIN — Medication 40 MILLIGRAM(S): at 14:30

## 2018-03-12 RX ADMIN — OXYCODONE AND ACETAMINOPHEN 1 TABLET(S): 5; 325 TABLET ORAL at 12:30

## 2018-03-12 RX ADMIN — Medication 125 MILLILITER(S): at 06:03

## 2018-03-12 RX ADMIN — FENTANYL CITRATE 25 MICROGRAM(S): 50 INJECTION INTRAVENOUS at 16:00

## 2018-03-12 RX ADMIN — OXYCODONE AND ACETAMINOPHEN 1 TABLET(S): 5; 325 TABLET ORAL at 11:22

## 2018-03-12 RX ADMIN — Medication 2.17 MICROGRAM(S)/KG/MIN: at 06:54

## 2018-03-12 RX ADMIN — Medication 10 MILLIGRAM(S): at 04:09

## 2018-03-12 RX ADMIN — Medication 125 MILLILITER(S): at 10:55

## 2018-03-12 RX ADMIN — Medication 100 MILLIGRAM(S): at 15:49

## 2018-03-12 RX ADMIN — HYDROMORPHONE HYDROCHLORIDE 0.5 MILLIGRAM(S): 2 INJECTION INTRAMUSCULAR; INTRAVENOUS; SUBCUTANEOUS at 02:10

## 2018-03-12 RX ADMIN — Medication 325 MILLIGRAM(S): at 11:03

## 2018-03-12 RX ADMIN — CLOPIDOGREL BISULFATE 75 MILLIGRAM(S): 75 TABLET, FILM COATED ORAL at 11:03

## 2018-03-12 RX ADMIN — ATORVASTATIN CALCIUM 40 MILLIGRAM(S): 80 TABLET, FILM COATED ORAL at 21:06

## 2018-03-12 RX ADMIN — Medication 100 MILLIGRAM(S): at 21:06

## 2018-03-12 RX ADMIN — Medication 4.34 MICROGRAM(S)/KG/MIN: at 06:04

## 2018-03-12 RX ADMIN — HEPARIN SODIUM 5000 UNIT(S): 5000 INJECTION INTRAVENOUS; SUBCUTANEOUS at 06:03

## 2018-03-12 RX ADMIN — FENTANYL CITRATE 25 MICROGRAM(S): 50 INJECTION INTRAVENOUS at 20:45

## 2018-03-12 RX ADMIN — Medication 100 MILLIGRAM(S): at 06:03

## 2018-03-12 RX ADMIN — HYDROMORPHONE HYDROCHLORIDE 0.5 MILLIGRAM(S): 2 INJECTION INTRAMUSCULAR; INTRAVENOUS; SUBCUTANEOUS at 01:55

## 2018-03-12 RX ADMIN — Medication 20 MILLIGRAM(S): at 23:35

## 2018-03-12 RX ADMIN — Medication 25 MICROGRAM(S): at 06:03

## 2018-03-12 RX ADMIN — Medication 100 MILLIGRAM(S): at 17:25

## 2018-03-12 RX ADMIN — Medication 20 MILLIGRAM(S): at 09:02

## 2018-03-12 RX ADMIN — FENTANYL CITRATE 25 MICROGRAM(S): 50 INJECTION INTRAVENOUS at 20:30

## 2018-03-12 RX ADMIN — HEPARIN SODIUM 5000 UNIT(S): 5000 INJECTION INTRAVENOUS; SUBCUTANEOUS at 15:49

## 2018-03-12 RX ADMIN — HEPARIN SODIUM 5000 UNIT(S): 5000 INJECTION INTRAVENOUS; SUBCUTANEOUS at 21:06

## 2018-03-12 RX ADMIN — Medication 100 MILLIGRAM(S): at 01:52

## 2018-03-12 RX ADMIN — FENTANYL CITRATE 25 MICROGRAM(S): 50 INJECTION INTRAVENOUS at 16:15

## 2018-03-12 NOTE — DIETITIAN INITIAL EVALUATION ADULT. - NS AS NUTRI INTERV ED CONTENT
Other (specify)/Verbally educated Pt's  regarding increased nutrient needs and good protein sources for wound healing.  verbalzied understanding. Written education materials for BM and Heart health eating nutrition therapy provided to pt's  at bedside for later review.  RD remains available to monitor PO intake, wt, labs and diet education review

## 2018-03-12 NOTE — PROGRESS NOTE ADULT - SUBJECTIVE AND OBJECTIVE BOX
Operation ; C4L  POD; 1    Patient is doing well, resting comfortably; complains of mild incisional pain that is relieved by pain medication.    Vital Signs Last 24 Hrs  T(C): 38.3 (12 Mar 2018 00:00), Max: 38.6 (11 Mar 2018 20:00)  T(F): 100.9 (12 Mar 2018 00:00), Max: 101.5 (11 Mar 2018 20:00)  HR: 84 (12 Mar 2018 03:00) (62 - 89)  BP: 173/73 (11 Mar 2018 07:26) (131/64 - 173/73)  BP(mean): 112 (11 Mar 2018 06:00) (91 - 112)  RR: 23 (12 Mar 2018 02:00) (12 - 41)  SpO2: 100% (12 Mar 2018 02:00) (96% - 100%)  I&O's Detail    10 Mar 2018 06:01  -  11 Mar 2018 07:00  --------------------------------------------------------  IN:    dextrose 5%.: 15 mL    heparin Infusion: 287.5 mL    insulin Infusion: 83 mL    Oral Fluid: 370 mL  Total IN: 755.5 mL    OUT:    Voided: 1775 mL  Total OUT: 1775 mL    Total NET: -1019.5 mL      11 Mar 2018 07:01  -  12 Mar 2018 03:52  --------------------------------------------------------  IN:    0.9% NaCl: 500 mL    Albumin 5%  - 250 mL: 1000 mL    insulin Infusion: 29 mL    IV PiggyBack: 300 mL    Lactated Ringers IV Bolus: 750 mL    norepinephrine Infusion: 72 mL    Oral Fluid: 210 mL    propofol Infusion: 25.5 mL    sodium chloride 0.45%.: 120 mL    vasopressin Infusion: 16 mL  Total IN: 3022.5 mL    OUT:    Bulb: 45 mL    Chest Tube: 140 mL    Chest Tube: 270 mL    Indwelling Catheter - Urethral: 830 mL  Total OUT: 1285 mL    Total NET: 1737.5 mL          CHEST TUBE:  on Suction  NORRIS DRAIN:    EPICARDIAL WIRES: Ventricular wires   GOODSON: Yes/No.    MEDICATIONS  (STANDING):  aspirin enteric coated 325 milliGRAM(s) Oral daily  atorvastatin 80 milliGRAM(s) Oral at bedtime  cefuroxime  IVPB 1500 milliGRAM(s) IV Intermittent every 8 hours  dextrose 50% Injectable 50 milliLiter(s) IV Push every 15 minutes  dextrose 50% Injectable 25 milliLiter(s) IV Push every 15 minutes  docusate sodium 100 milliGRAM(s) Oral three times a day  insulin Infusion 2 Unit(s)/Hr (2 mL/Hr) IV Continuous <Continuous>  levothyroxine 25 MICROGram(s) Oral daily  norepinephrine Infusion 0.074 MICROgram(s)/kG/Min (10 mL/Hr) IV Continuous <Continuous>  pantoprazole  Injectable 40 milliGRAM(s) IV Push daily  sodium chloride 0.45%. 1000 milliLiter(s) (10 mL/Hr) IV Continuous <Continuous>    MEDICATIONS  (PRN):      General: A+Ox3, in NAD  Chest: sternal dressing C/D/I  Heart: S1, S2, RRR  Lungs: CTA B/L, without W/R/R  Abdomen: Soft, ND, NT, positive BS  Extremeties: without edema B/L LE, positive DP pulses B/L     Does the patient have a history of CHF?  -If yes, systolic or diastolic  -pre-operative EF    Extubation within 24 hours?    CXR reviewed with attending.     Does the patient have a history of kidney disease?   -give CKD stage if applicable  -what is patient's baseline Creatinine    Beta Blockers contraindicated for the first 24 hours due to vasopressor/inotropic medication      Did the patient receive transfusion of blood and/or products?  -If yes, indication    DVT PPX with vendodynes as well as chemical prophylaxis.    Sowmya-operative antibiotics to be discontinued within 48 hours of CTU admission    Patient care discussed on morning interdisciplinary rounds with CTS team. Operation ; C4L  POD; 1    Patient is  resting comfortably; complains of mild incisional pain that is relieved by pain medication. EKG/ S{PO2 monitoring in progress    Vital Signs Last 24 Hrs  T(C): 38.3 (12 Mar 2018 00:00), Max: 38.6 (11 Mar 2018 20:00)  T(F): 100.9 (12 Mar 2018 00:00), Max: 101.5 (11 Mar 2018 20:00)  HR: 84 (12 Mar 2018 03:00) (62 - 89)  BP: 173/73 (11 Mar 2018 07:26) (131/64 - 173/73)  BP(mean): 112 (11 Mar 2018 06:00) (91 - 112)  RR: 23 (12 Mar 2018 02:00) (12 - 41)  SpO2: 100% (12 Mar 2018 02:00) (96% - 100%)  I&O's Detail    10 Mar 2018 06:01  -  11 Mar 2018 07:00  --------------------------------------------------------  IN:    dextrose 5%.: 15 mL    heparin Infusion: 287.5 mL    insulin Infusion: 83 mL    Oral Fluid: 370 mL  Total IN: 755.5 mL    OUT:    Voided: 1775 mL  Total OUT: 1775 mL    Total NET: -1019.5 mL      11 Mar 2018 07:01  -  12 Mar 2018 03:52  --------------------------------------------------------  IN:    0.9% NaCl: 500 mL    Albumin 5%  - 250 mL: 1000 mL    insulin Infusion: 29 mL    IV PiggyBack: 300 mL    Lactated Ringers IV Bolus: 750 mL    norepinephrine Infusion: 72 mL    Oral Fluid: 210 mL    propofol Infusion: 25.5 mL    sodium chloride 0.45%.: 120 mL    vasopressin Infusion: 16 mL  Total IN: 3022.5 mL    OUT:    Bulb: 45 mL    Chest Tube: 140 mL    Chest Tube: 270 mL    Indwelling Catheter - Urethral: 830 mL  Total OUT: 1285 mL    Total NET: 1737.5 mL          CHEST TUBE:  on Suction  EPICARDIAL WIRES: Ventricular wires   GOODSON: Yes    MEDICATIONS  (STANDING):  aspirin enteric coated 325 milliGRAM(s) Oral daily  atorvastatin 80 milliGRAM(s) Oral at bedtime  cefuroxime  IVPB 1500 milliGRAM(s) IV Intermittent every 8 hours  dextrose 50% Injectable 50 milliLiter(s) IV Push every 15 minutes  dextrose 50% Injectable 25 milliLiter(s) IV Push every 15 minutes  docusate sodium 100 milliGRAM(s) Oral three times a day  insulin Infusion 2 Unit(s)/Hr (2 mL/Hr) IV Continuous <Continuous>  levothyroxine 25 MICROGram(s) Oral daily  norepinephrine Infusion 0.074 MICROgram(s)/kG/Min (10 mL/Hr) IV Continuous <Continuous>  pantoprazole  Injectable 40 milliGRAM(s) IV Push daily  sodium chloride 0.45%. 1000 milliLiter(s) (10 mL/Hr) IV Continuous <Continuous>    MEDICATIONS  (PRN):      General: A+Ox3, in NAD  Chest: sternal dressing C/D/I  Heart: S1, S2, RRR  Lungs: CTA B/L,diminished at the bases  Abdomen: Soft, ND, NT, positive BS  Extremeties: without edema B/L LE, positive DP pulses B/L     Does the patient have a history of CHF?  no  -If yes, systolic or diastolic  -pre-operative EF  nL    Extubation within 24 hours?  yes    CXR reviewed with attending.     Does the patient have a history of kidney disease? no  -give CKD stage if applicable  -what is patient's baseline Creatinine 0.9    Beta Blockers contraindicated for the first 24 hours due to vasopressor/inotropic medication      Did the patient receive transfusion of blood and/or products? no products    DVT PPX with vendodynes as well as chemical prophylaxis.    Sowmya-operative antibiotics to be discontinued within 48 hours of CTU admission    Patient care discussed on morning interdisciplinary rounds with CTS team.

## 2018-03-12 NOTE — DIETITIAN INITIAL EVALUATION ADULT. - PROBLEM SELECTOR PROBLEM 1
Syncope
Coronary artery disease involving native coronary artery of native heart with unstable angina pectoris

## 2018-03-12 NOTE — PROGRESS NOTE ADULT - SUBJECTIVE AND OBJECTIVE BOX
CARMENCITA WINKLER  MRN#: [id number]    This is a a53 y.o. female with PMH HTN, hypothyroidism and DM2 presented to [facility] 3/8/18 admitted with NSTEMI Underwent and is now s/p C4L 3/09 and remains in CTU due to atelectasis and an A-a gradient requiring high flow oxygen.    OBJECTIVE:  ICU Vital Signs Last 24 Hrs  T(C): 37.8 (12 Mar 2018 20:00), Max: 38.3 (12 Mar 2018 00:00)  T(F): 100 (12 Mar 2018 20:00), Max: 100.9 (12 Mar 2018 00:00)  HR: 89 (12 Mar 2018 23:00) (80 - 101)  BP: --  BP(mean): --  ABP: 135/59 (12 Mar 2018 23:00) (96/47 - 170/79)  ABP(mean): 81 (12 Mar 2018 23:00) (63 - 130)  RR: 31 (12 Mar 2018 23:00) (21 - 48)  SpO2: 97% (12 Mar 2018 23:00) (89% - 100%)       @ 07: @ 07:00  --------------------------------------------------------  IN: 3557.2 mL / OUT: 1615 mL / NET: 1942.2 mL     @ 07: @ 23:51  --------------------------------------------------------  IN: 576.7 mL / OUT: 589 mL / NET: -12.3 mL        PHYSICAL EXAM:Daily     Daily Weight in k.2 (12 Mar 2018 11:37)  General: WN/WD NAD    HEENT:     + NCAT  + EOMI  - Conjuctival edema   - Icterus   - Thrush   Neck:         + FROM    - JVD     - Nodes     - Masses    + Mid-line trachea   Chest:         - Sternal click  - Sternal drainage + Chest tubes  Lungs:          + CTA   - Rhonchi    - Rales    - Wheezing     - Decreased BS   - Dullness R L  Cardiac:       + S1 + S2    + RRR   - S3  - S4    - Murmurs    Abdomen:    + BS     + Soft    + Non-tender     - Distended    - Organomegaly   Extremities:   - Cyanosis U/L   - Clubbing  U/L  +1 LE/UE Edema   + Capillary refill    + Pulses   Neuro:        + Arousable but drowsy - Confused   - Generalized Weakness  Skin:        - Rashes    - Erythema   + Normal incisions   + IV sites intact  - Sacral decubitus    HOSPITAL MEDICATIONS: All mediciations reviewed and analyzed  MEDICATIONS  (STANDING):  aspirin enteric coated 81 milliGRAM(s) Oral daily  atorvastatin 40 milliGRAM(s) Oral at bedtime  cefuroxime  IVPB 1500 milliGRAM(s) IV Intermittent every 8 hours  clopidogrel Tablet 75 milliGRAM(s) Oral daily  dextrose 50% Injectable 50 milliLiter(s) IV Push every 15 minutes  dextrose 50% Injectable 25 milliLiter(s) IV Push every 15 minutes  docusate sodium 100 milliGRAM(s) Oral three times a day  furosemide   Injectable 20 milliGRAM(s) IV Push once  heparin  Injectable 5000 Unit(s) SubCutaneous every 8 hours  insulin Infusion 2 Unit(s)/Hr (2 mL/Hr) IV Continuous <Continuous>  levothyroxine 25 MICROGram(s) Oral daily  pantoprazole    Tablet 40 milliGRAM(s) Oral before breakfast  sodium chloride 0.45%. 1000 milliLiter(s) (10 mL/Hr) IV Continuous <Continuous>    MEDICATIONS  (PRN):  oxyCODONE    5 mG/acetaminophen 325 mG 1 Tablet(s) Oral every 4 hours PRN Moderate Pain (4 - 6)  oxyCODONE    5 mG/acetaminophen 325 mG 2 Tablet(s) Oral every 6 hours PRN Severe Pain (7 - 10)    LABS: All Lab data reviewed and analyzed                        9.7    23.2  )-----------( 155      ( 12 Mar 2018 01:54 )             27.9    03-12    143  |  109<H>  |  12  ----------------------------<  129<H>  5.2   |  22  |  0.98    Ca    8.5      12 Mar 2018 01:54  Phos  5.4     03-12  Mg     2.1     03-12    TPro  5.3<L>  /  Alb  2.9<L>  /  TBili  0.9  /  DBili  x   /  AST  105<H>  /  ALT  30  /  AlkPhos  27<L>      PT/INR - ( 11 Mar 2018 15:40 )   PT: 14.5 sec;   INR: 1.33 ratio         PTT - ( 11 Mar 2018 15:40 )  PTT:34.2 sec LIVER FUNCTIONS - ( 12 Mar 2018 01:54 )  Alb: 2.9 g/dL / Pro: 5.3 g/dL / ALK PHOS: 27 U/L / ALT: 30 U/L RC / AST: 105 U/L / GGT: x           RADIOLOGY: - Reviewed and analyzed     Respiratory status required supplemental oxygen & the following of continuous pulse oximetry for support & to prevent decompensation  Continued early mobilization as tolerated  Addressed analgesic regimen to optimize function    ASA continued for graft occlusion-thromboembolism prophylaxis  Lipitor/Zocor was also started for long term graft patency  Lovenox/Heparin initiated/continued for VTE prophylaxis in addition to Venodyne boots  Protonix/Pepcid maintained for GI bleeding prophylaxis  Lopressor initiated/continued for atrial fibrillation prophylaxis  Metabolic stability & infection prophylaxis required review and adjustment of regular Insulin sliding scale and gylcemic regimen while following serial glucose levels to help achieve & maintain euglycemia  Reviewed & addressed surgical site infection prophylaxis regimen CARMENCITA WINKLER  MRN#: [id number]    This is a a53 y.o. female with PMH HTN, hypothyroidism and DM2 presented to [facility] 3/8/18 admitted with NSTEMI Underwent and is now s/p C4L 3/09 and remains in CTU due to atelectasis and an A-a gradient requiring high flow oxygen.    OBJECTIVE:  ICU Vital Signs Last 24 Hrs  T(C): 37.8 (12 Mar 2018 20:00), Max: 38.3 (12 Mar 2018 00:00)  T(F): 100 (12 Mar 2018 20:00), Max: 100.9 (12 Mar 2018 00:00)  HR: 89 (12 Mar 2018 23:00) (80 - 101)  BP: --  BP(mean): --  ABP: 135/59 (12 Mar 2018 23:00) (96/47 - 170/79)  ABP(mean): 81 (12 Mar 2018 23:00) (63 - 130)  RR: 31 (12 Mar 2018 23:00) (21 - 48)  SpO2: 97% (12 Mar 2018 23:00) (89% - 100%)       @ 07: @ 07:00  --------------------------------------------------------  IN: 3557.2 mL / OUT: 1615 mL / NET: 1942.2 mL     @ 07: @ 23:51  --------------------------------------------------------  IN: 576.7 mL / OUT: 589 mL / NET: -12.3 mL        PHYSICAL EXAM:Daily     Daily Weight in k.2 (12 Mar 2018 11:37)  General: WN/WD NAD    HEENT:     + NCAT  + EOMI  - Conjuctival edema   - Icterus   - Thrush   Neck:         + FROM    - JVD     - Nodes     - Masses    + Mid-line trachea   Chest:         - Sternal click  - Sternal drainage + Chest tubes  Lungs:          + CTA   - Rhonchi    - Rales    - Wheezing     - Decreased BS   - Dullness R L  Cardiac:       + S1 + S2    + RRR   - S3  - S4    - Murmurs    Abdomen:    + BS     + Soft    + Non-tender     - Distended    - Organomegaly   Extremities:   - Cyanosis U/L   - Clubbing  U/L  +1 LE/UE Edema   + Capillary refill    + Pulses   Neuro:        + Arousable but drowsy - Confused   - Generalized Weakness  Skin:        - Rashes    - Erythema   + Normal incisions   + IV sites intact  - Sacral decubitus    HOSPITAL MEDICATIONS: All mediciations reviewed and analyzed  MEDICATIONS  (STANDING):  aspirin enteric coated 81 milliGRAM(s) Oral daily  atorvastatin 40 milliGRAM(s) Oral at bedtime  cefuroxime  IVPB 1500 milliGRAM(s) IV Intermittent every 8 hours  clopidogrel Tablet 75 milliGRAM(s) Oral daily  dextrose 50% Injectable 50 milliLiter(s) IV Push every 15 minutes  dextrose 50% Injectable 25 milliLiter(s) IV Push every 15 minutes  docusate sodium 100 milliGRAM(s) Oral three times a day  furosemide   Injectable 20 milliGRAM(s) IV Push once  heparin  Injectable 5000 Unit(s) SubCutaneous every 8 hours  insulin Infusion 2 Unit(s)/Hr (2 mL/Hr) IV Continuous <Continuous>  levothyroxine 25 MICROGram(s) Oral daily  pantoprazole    Tablet 40 milliGRAM(s) Oral before breakfast  sodium chloride 0.45%. 1000 milliLiter(s) (10 mL/Hr) IV Continuous <Continuous>    MEDICATIONS  (PRN):  oxyCODONE    5 mG/acetaminophen 325 mG 1 Tablet(s) Oral every 4 hours PRN Moderate Pain (4 - 6)  oxyCODONE    5 mG/acetaminophen 325 mG 2 Tablet(s) Oral every 6 hours PRN Severe Pain (7 - 10)    LABS: All Lab data reviewed and analyzed                        9.7    23.2  )-----------( 155      ( 12 Mar 2018 01:54 )             27.9    03-12    143  |  109<H>  |  12  ----------------------------<  129<H>  5.2   |  22  |  0.98    Ca    8.5      12 Mar 2018 01:54  Phos  5.4     03-12  Mg     2.1     03-12    TPro  5.3<L>  /  Alb  2.9<L>  /  TBili  0.9  /  DBili  x   /  AST  105<H>  /  ALT  30  /  AlkPhos  27<L>      PT/INR - ( 11 Mar 2018 15:40 )   PT: 14.5 sec;   INR: 1.33 ratio         PTT - ( 11 Mar 2018 15:40 )  PTT:34.2 sec LIVER FUNCTIONS - ( 12 Mar 2018 01:54 )  Alb: 2.9 g/dL / Pro: 5.3 g/dL / ALK PHOS: 27 U/L / ALT: 30 U/L RC / AST: 105 U/L / GGT: x           RADIOLOGY: - Reviewed and analyzed     Respiratory status required supplemental high flow oxygen, now transitioned to nasal cannula & the following of continuous pulse oximetry for support & to prevent decompensation.  IV Lasix ordered for oliguria and pulmonary congestion on chest xray.  Continued early mobilization as tolerated  Addressed analgesic regimen to optimize function    ASA continued for graft occlusion-thromboembolism prophylaxis  Lipitor was also started for long term graft patency  SQ Heparin continued for VTE prophylaxis in addition to Venodyne boots  Protonix initiated for GI bleeding prophylaxis  Lopressor initiated/continued for atrial fibrillation prophylaxis  Metabolic stability & infection prophylaxis required review and adjustment of regular Insulin sliding scale and gylcemic regimen while following serial glucose levels to help achieve & maintain euglycemia  Reviewed & addressed surgical site infection prophylaxis regimen CARMENCITA WINKLER  MRN#: [id number]    This is a a53 y.o. female with PMH HTN, hypothyroidism and DM2 presented to [facility] 3/8/18 admitted with NSTEMI Underwent and is now s/p C4L 3/09 and remains in CTU due to atelectasis and an A-a gradient requiring high flow oxygen.    OBJECTIVE:  ICU Vital Signs Last 24 Hrs  T(C): 37.8 (12 Mar 2018 20:00), Max: 38.3 (12 Mar 2018 00:00)  T(F): 100 (12 Mar 2018 20:00), Max: 100.9 (12 Mar 2018 00:00)  HR: 89 (12 Mar 2018 23:00) (80 - 101)  BP: --  BP(mean): --  ABP: 135/59 (12 Mar 2018 23:00) (96/47 - 170/79)  ABP(mean): 81 (12 Mar 2018 23:00) (63 - 130)  RR: 31 (12 Mar 2018 23:00) (21 - 48)  SpO2: 97% (12 Mar 2018 23:00) (89% - 100%)       @ 07: @ 07:00  --------------------------------------------------------  IN: 3557.2 mL / OUT: 1615 mL / NET: 1942.2 mL     @ 07: @ 23:51  --------------------------------------------------------  IN: 576.7 mL / OUT: 589 mL / NET: -12.3 mL        PHYSICAL EXAM:Daily     Daily Weight in k.2 (12 Mar 2018 11:37)  General: WN/WD NAD    HEENT:     + NCAT  + EOMI  - Conjuctival edema   - Icterus   - Thrush   Neck:         + FROM    - JVD     - Nodes     - Masses    + Mid-line trachea   Chest:         - Sternal click  - Sternal drainage + Chest tubes  Lungs:          + CTA   - Rhonchi    - Rales    - Wheezing     - Decreased BS   - Dullness R L  Cardiac:       + S1 + S2    + RRR   - S3  - S4    - Murmurs    Abdomen:    + BS     + Soft    + Non-tender     - Distended    - Organomegaly   Extremities:   - Cyanosis U/L   - Clubbing  U/L  +1 LE/UE Edema   + Capillary refill    + Pulses   Neuro:        + Arousable but drowsy - Confused   - Generalized Weakness  Skin:        - Rashes    - Erythema   + Normal incisions   + IV sites intact  - Sacral decubitus    HOSPITAL MEDICATIONS: All mediciations reviewed and analyzed  MEDICATIONS  (STANDING):  aspirin enteric coated 81 milliGRAM(s) Oral daily  atorvastatin 40 milliGRAM(s) Oral at bedtime  cefuroxime  IVPB 1500 milliGRAM(s) IV Intermittent every 8 hours  clopidogrel Tablet 75 milliGRAM(s) Oral daily  dextrose 50% Injectable 50 milliLiter(s) IV Push every 15 minutes  dextrose 50% Injectable 25 milliLiter(s) IV Push every 15 minutes  docusate sodium 100 milliGRAM(s) Oral three times a day  furosemide   Injectable 20 milliGRAM(s) IV Push once  heparin  Injectable 5000 Unit(s) SubCutaneous every 8 hours  insulin Infusion 2 Unit(s)/Hr (2 mL/Hr) IV Continuous <Continuous>  levothyroxine 25 MICROGram(s) Oral daily  pantoprazole    Tablet 40 milliGRAM(s) Oral before breakfast  sodium chloride 0.45%. 1000 milliLiter(s) (10 mL/Hr) IV Continuous <Continuous>    MEDICATIONS  (PRN):  oxyCODONE    5 mG/acetaminophen 325 mG 1 Tablet(s) Oral every 4 hours PRN Moderate Pain (4 - 6)  oxyCODONE    5 mG/acetaminophen 325 mG 2 Tablet(s) Oral every 6 hours PRN Severe Pain (7 - 10)    LABS: All Lab data reviewed and analyzed                        9.7    23.2  )-----------( 155      ( 12 Mar 2018 01:54 )             27.9    03-12    143  |  109<H>  |  12  ----------------------------<  129<H>  5.2   |  22  |  0.98    Ca    8.5      12 Mar 2018 01:54  Phos  5.4     03-12  Mg     2.1     03-12    TPro  5.3<L>  /  Alb  2.9<L>  /  TBili  0.9  /  DBili  x   /  AST  105<H>  /  ALT  30  /  AlkPhos  27<L>      PT/INR - ( 11 Mar 2018 15:40 )   PT: 14.5 sec;   INR: 1.33 ratio         PTT - ( 11 Mar 2018 15:40 )  PTT:34.2 sec LIVER FUNCTIONS - ( 12 Mar 2018 01:54 )  Alb: 2.9 g/dL / Pro: 5.3 g/dL / ALK PHOS: 27 U/L / ALT: 30 U/L RC / AST: 105 U/L / GGT: x           RADIOLOGY: - Reviewed and analyzed     Respiratory status required supplemental high flow oxygen, now transitioned to nasal cannula & the following of continuous pulse oximetry for support & to prevent decompensation.  IV Lasix ordered for oliguria and pulmonary congestion on chest xray.  Continued early mobilization as tolerated  Addressed analgesic regimen to optimize function    ASA and Plavix continued for graft occlusion-thromboembolism prophylaxis  Lipitor was also started for long term graft patency  SQ Heparin continued for VTE prophylaxis in addition to Venodyne boots  Protonix initiated for GI bleeding prophylaxis  Lopressor initiated/continued for atrial fibrillation prophylaxis  Metabolic stability & infection prophylaxis required review and adjustment of regular Insulin sliding scale and gylcemic regimen while following serial glucose levels to help achieve & maintain euglycemia  Reviewed & addressed surgical site infection prophylaxis regimen    Care plan discussed with ICU care team on rounds and later in follow up at the bedside.

## 2018-03-12 NOTE — DIETITIAN INITIAL EVALUATION ADULT. - OTHER INFO
Pt seen for LOS in CTU. Pt seen sitting in chart sleeping.  states Pt normally has a good appetite and denies her to suffer from GI distress. Per , Pt checks BG levels 1x daily with usual results between 100-170mg/dl, Pt takes metformin to manage DM. Pt's Hgba1c is 7.5%.  at bedside was educated regarding increased nutrient needs for wound healing, he verbalized undertsanding.  made aware that RD remains available for food preferences as well as Heart health/DM diet education when Pt amenable.  denies Pt to takes vitamins PTA. No difficulty chewing/swallowing. NKFA

## 2018-03-12 NOTE — PROGRESS NOTE ADULT - SUBJECTIVE AND OBJECTIVE BOX
Chief complaint  Patient is a 53y old  Female who presents with a chief complaint of I need heart surgery (08 Mar 2018 17:32)   Review of systems  Patient in bed, looks comfortable, no fever, no hypoglycemia.    Labs and Fingersticks  CAPILLARY BLOOD GLUCOSE  121 (12 Mar 2018 07:00)  117 (12 Mar 2018 06:00)  106 (12 Mar 2018 05:00)  107 (12 Mar 2018 04:00)  111 (12 Mar 2018 03:00)  115 (12 Mar 2018 02:00)  120 (12 Mar 2018 01:00)  127 (12 Mar 2018 00:00)  123 (11 Mar 2018 23:00)  126 (11 Mar 2018 22:00)  131 (11 Mar 2018 21:00)  133 (11 Mar 2018 20:00)  149 (11 Mar 2018 19:00)  151 (11 Mar 2018 18:00)  153 (11 Mar 2018 17:00)  173 (11 Mar 2018 15:45)      POCT Blood Glucose.: 117 mg/dL (12 Mar 2018 12:55)  POCT Blood Glucose.: 125 mg/dL (12 Mar 2018 11:01)  POCT Blood Glucose.: 125 mg/dL (12 Mar 2018 09:54)  POCT Blood Glucose.: 133 mg/dL (12 Mar 2018 09:08)  POCT Blood Glucose.: 106 mg/dL (12 Mar 2018 04:54)  POCT Blood Glucose.: 108 mg/dL (12 Mar 2018 03:52)  POCT Blood Glucose.: 111 mg/dL (12 Mar 2018 02:54)  POCT Blood Glucose.: 115 mg/dL (12 Mar 2018 02:10)  POCT Blood Glucose.: 127 mg/dL (11 Mar 2018 23:46)  POCT Blood Glucose.: 133 mg/dL (11 Mar 2018 19:58)      Anion Gap, Serum: 12 (03-12 @ 01:54)  Anion Gap, Serum: 13 (03-11 @ 15:41)  Anion Gap, Serum: 13 (03-11 @ 03:11)      Calcium, Total Serum: 8.5 (03-12 @ 01:54)  Calcium, Total Serum: 9.1 (03-11 @ 15:41)  Calcium, Total Serum: 9.1 (03-11 @ 03:11)  Albumin, Serum: 2.9 <L> (03-12 @ 01:54)  Albumin, Serum: 2.1 <L> (03-11 @ 15:41)  Albumin, Serum: 3.1 <L> (03-11 @ 03:11)    Alanine Aminotransferase (ALT/SGPT): 30 (03-12 @ 01:54)  Alanine Aminotransferase (ALT/SGPT): 27 (03-11 @ 15:41)  Alanine Aminotransferase (ALT/SGPT): 38 (03-11 @ 03:11)  Alkaline Phosphatase, Serum: 27 <L> (03-12 @ 01:54)  Alkaline Phosphatase, Serum: 31 <L> (03-11 @ 15:41)  Alkaline Phosphatase, Serum: 50 (03-11 @ 03:11)  Aspartate Aminotransferase (AST/SGOT): 105 <H> (03-12 @ 01:54)  Aspartate Aminotransferase (AST/SGOT): 70 <H> (03-11 @ 15:41)  Aspartate Aminotransferase (AST/SGOT): 36 (03-11 @ 03:11)        03-12    143  |  109<H>  |  12  ----------------------------<  129<H>  5.2   |  22  |  0.98    Ca    8.5      12 Mar 2018 01:54  Phos  5.4     03-12  Mg     2.1     03-12    TPro  5.3<L>  /  Alb  2.9<L>  /  TBili  0.9  /  DBili  x   /  AST  105<H>  /  ALT  30  /  AlkPhos  27<L>  03-12                        9.7    23.2  )-----------( 155      ( 12 Mar 2018 01:54 )             27.9     Medications  MEDICATIONS  (STANDING):  aspirin enteric coated 325 milliGRAM(s) Oral daily  atorvastatin 40 milliGRAM(s) Oral at bedtime  cefuroxime  IVPB 1500 milliGRAM(s) IV Intermittent every 8 hours  clopidogrel Tablet 75 milliGRAM(s) Oral daily  dextrose 50% Injectable 50 milliLiter(s) IV Push every 15 minutes  dextrose 50% Injectable 25 milliLiter(s) IV Push every 15 minutes  docusate sodium 100 milliGRAM(s) Oral three times a day  heparin  Injectable 5000 Unit(s) SubCutaneous every 8 hours  insulin Infusion 2 Unit(s)/Hr (2 mL/Hr) IV Continuous <Continuous>  levothyroxine 25 MICROGram(s) Oral daily  sodium chloride 0.45%. 1000 milliLiter(s) (10 mL/Hr) IV Continuous <Continuous>      Physical Exam  General: Patient comfortable in bed  Vital Signs Last 12 Hrs  T(F): 100.4 (03-12-18 @ 10:00), Max: 100.4 (03-12-18 @ 10:00)  HR: 80 (03-12-18 @ 13:00) (80 - 101)  BP: --  BP(mean): --  RR: 33 (03-12-18 @ 13:00) (21 - 48)  SpO2: 99% (03-12-18 @ 13:00) (89% - 100%)  Neck: No palpable thyroid nodules.  CVS: S1S2, No murmurs  Respiratory: No wheezing, no crepitations  GI: Abdomen soft, bowel sounds positive  Musculoskeletal:  edema lower extremities.   Skin: No skin rashes, no ecchymosis    Diagnostics

## 2018-03-12 NOTE — DIETITIAN INITIAL EVALUATION ADULT. - ORAL INTAKE PTA
good/ reports Pt to have a good PO intake. Breakfast: eggs and bread. Lunch: chicken. Dinner: chicken and vegetables. Drinks water and diet soda.

## 2018-03-12 NOTE — DIETITIAN INITIAL EVALUATION ADULT. - ENERGY NEEDS
Ht: 5'4", Pre-op Wt: 159.3lbs, BMI: 27.3kg/m2, IBW: 120lbs(+/-10%), 132%IBW  Pertinent information: Pt admitted to OSH with chest pain and SOB; found with NSTEMI, s/p cardiac cath with triple vessel Dz. Pt transferred to [facility], now POD #1 for CABG x4   +1 generalized Edema, Skin intact

## 2018-03-13 DIAGNOSIS — R69 ILLNESS, UNSPECIFIED: ICD-10-CM

## 2018-03-13 DIAGNOSIS — Z95.1 PRESENCE OF AORTOCORONARY BYPASS GRAFT: ICD-10-CM

## 2018-03-13 PROBLEM — E11.9 TYPE 2 DIABETES MELLITUS WITHOUT COMPLICATIONS: Chronic | Status: ACTIVE | Noted: 2018-03-07

## 2018-03-13 PROBLEM — E03.9 HYPOTHYROIDISM, UNSPECIFIED: Chronic | Status: ACTIVE | Noted: 2018-03-07

## 2018-03-13 PROBLEM — I10 ESSENTIAL (PRIMARY) HYPERTENSION: Chronic | Status: ACTIVE | Noted: 2018-03-07

## 2018-03-13 LAB
ALBUMIN SERPL ELPH-MCNC: 3 G/DL — LOW (ref 3.3–5)
ALP SERPL-CCNC: 38 U/L — LOW (ref 40–120)
ALT FLD-CCNC: 31 U/L RC — SIGNIFICANT CHANGE UP (ref 10–45)
ANION GAP SERPL CALC-SCNC: 14 MMOL/L — SIGNIFICANT CHANGE UP (ref 5–17)
ANION GAP SERPL CALC-SCNC: 14 MMOL/L — SIGNIFICANT CHANGE UP (ref 5–17)
AST SERPL-CCNC: 64 U/L — HIGH (ref 10–40)
BASOPHILS # BLD AUTO: 0 K/UL — SIGNIFICANT CHANGE UP (ref 0–0.2)
BASOPHILS NFR BLD AUTO: 0.2 % — SIGNIFICANT CHANGE UP (ref 0–2)
BILIRUB SERPL-MCNC: 0.8 MG/DL — SIGNIFICANT CHANGE UP (ref 0.2–1.2)
BLD GP AB SCN SERPL QL: NEGATIVE — SIGNIFICANT CHANGE UP
BUN SERPL-MCNC: 25 MG/DL — HIGH (ref 7–23)
BUN SERPL-MCNC: 36 MG/DL — HIGH (ref 7–23)
CALCIUM SERPL-MCNC: 8.1 MG/DL — LOW (ref 8.4–10.5)
CALCIUM SERPL-MCNC: 8.5 MG/DL — SIGNIFICANT CHANGE UP (ref 8.4–10.5)
CHLORIDE SERPL-SCNC: 103 MMOL/L — SIGNIFICANT CHANGE UP (ref 96–108)
CHLORIDE SERPL-SCNC: 104 MMOL/L — SIGNIFICANT CHANGE UP (ref 96–108)
CO2 SERPL-SCNC: 22 MMOL/L — SIGNIFICANT CHANGE UP (ref 22–31)
CO2 SERPL-SCNC: 22 MMOL/L — SIGNIFICANT CHANGE UP (ref 22–31)
CREAT SERPL-MCNC: 1.38 MG/DL — HIGH (ref 0.5–1.3)
CREAT SERPL-MCNC: 1.4 MG/DL — HIGH (ref 0.5–1.3)
CULTURE RESULTS: NO GROWTH — SIGNIFICANT CHANGE UP
EOSINOPHIL # BLD AUTO: 0 K/UL — SIGNIFICANT CHANGE UP (ref 0–0.5)
EOSINOPHIL NFR BLD AUTO: 0.1 % — SIGNIFICANT CHANGE UP (ref 0–6)
GAS PNL BLDA: SIGNIFICANT CHANGE UP
GAS PNL BLDA: SIGNIFICANT CHANGE UP
GLUCOSE BLDC GLUCOMTR-MCNC: 101 MG/DL — HIGH (ref 70–99)
GLUCOSE BLDC GLUCOMTR-MCNC: 102 MG/DL — HIGH (ref 70–99)
GLUCOSE BLDC GLUCOMTR-MCNC: 106 MG/DL — HIGH (ref 70–99)
GLUCOSE BLDC GLUCOMTR-MCNC: 107 MG/DL — HIGH (ref 70–99)
GLUCOSE BLDC GLUCOMTR-MCNC: 108 MG/DL — HIGH (ref 70–99)
GLUCOSE BLDC GLUCOMTR-MCNC: 110 MG/DL — HIGH (ref 70–99)
GLUCOSE BLDC GLUCOMTR-MCNC: 111 MG/DL — HIGH (ref 70–99)
GLUCOSE BLDC GLUCOMTR-MCNC: 112 MG/DL — HIGH (ref 70–99)
GLUCOSE BLDC GLUCOMTR-MCNC: 118 MG/DL — HIGH (ref 70–99)
GLUCOSE BLDC GLUCOMTR-MCNC: 163 MG/DL — HIGH (ref 70–99)
GLUCOSE BLDC GLUCOMTR-MCNC: 165 MG/DL — HIGH (ref 70–99)
GLUCOSE BLDC GLUCOMTR-MCNC: 183 MG/DL — HIGH (ref 70–99)
GLUCOSE BLDC GLUCOMTR-MCNC: 76 MG/DL — SIGNIFICANT CHANGE UP (ref 70–99)
GLUCOSE SERPL-MCNC: 110 MG/DL — HIGH (ref 70–99)
GLUCOSE SERPL-MCNC: 149 MG/DL — HIGH (ref 70–99)
HCT VFR BLD CALC: 24.1 % — LOW (ref 34.5–45)
HCT VFR BLD CALC: 26.1 % — LOW (ref 34.5–45)
HGB BLD-MCNC: 8.4 G/DL — LOW (ref 11.5–15.5)
HGB BLD-MCNC: 9.3 G/DL — LOW (ref 11.5–15.5)
LYMPHOCYTES # BLD AUTO: 2.4 K/UL — SIGNIFICANT CHANGE UP (ref 1–3.3)
LYMPHOCYTES # BLD AUTO: 20.4 % — SIGNIFICANT CHANGE UP (ref 13–44)
MCHC RBC-ENTMCNC: 31.3 PG — SIGNIFICANT CHANGE UP (ref 27–34)
MCHC RBC-ENTMCNC: 32 PG — SIGNIFICANT CHANGE UP (ref 27–34)
MCHC RBC-ENTMCNC: 34.9 GM/DL — SIGNIFICANT CHANGE UP (ref 32–36)
MCHC RBC-ENTMCNC: 35.5 GM/DL — SIGNIFICANT CHANGE UP (ref 32–36)
MCV RBC AUTO: 89.6 FL — SIGNIFICANT CHANGE UP (ref 80–100)
MCV RBC AUTO: 90.2 FL — SIGNIFICANT CHANGE UP (ref 80–100)
MONOCYTES # BLD AUTO: 1.4 K/UL — HIGH (ref 0–0.9)
MONOCYTES NFR BLD AUTO: 12.2 % — SIGNIFICANT CHANGE UP (ref 2–14)
NEUTROPHILS # BLD AUTO: 7.8 K/UL — HIGH (ref 1.8–7.4)
NEUTROPHILS NFR BLD AUTO: 67.2 % — SIGNIFICANT CHANGE UP (ref 43–77)
PLATELET # BLD AUTO: 101 K/UL — LOW (ref 150–400)
PLATELET # BLD AUTO: 121 K/UL — LOW (ref 150–400)
POTASSIUM SERPL-MCNC: 4.1 MMOL/L — SIGNIFICANT CHANGE UP (ref 3.5–5.3)
POTASSIUM SERPL-MCNC: 4.2 MMOL/L — SIGNIFICANT CHANGE UP (ref 3.5–5.3)
POTASSIUM SERPL-SCNC: 4.1 MMOL/L — SIGNIFICANT CHANGE UP (ref 3.5–5.3)
POTASSIUM SERPL-SCNC: 4.2 MMOL/L — SIGNIFICANT CHANGE UP (ref 3.5–5.3)
PROT SERPL-MCNC: 6.1 G/DL — SIGNIFICANT CHANGE UP (ref 6–8.3)
RBC # BLD: 2.68 M/UL — LOW (ref 3.8–5.2)
RBC # BLD: 2.9 M/UL — LOW (ref 3.8–5.2)
RBC # FLD: 13.9 % — SIGNIFICANT CHANGE UP (ref 10.3–14.5)
RBC # FLD: 14.2 % — SIGNIFICANT CHANGE UP (ref 10.3–14.5)
RH IG SCN BLD-IMP: POSITIVE — SIGNIFICANT CHANGE UP
SODIUM SERPL-SCNC: 139 MMOL/L — SIGNIFICANT CHANGE UP (ref 135–145)
SODIUM SERPL-SCNC: 140 MMOL/L — SIGNIFICANT CHANGE UP (ref 135–145)
SPECIMEN SOURCE: SIGNIFICANT CHANGE UP
WBC # BLD: 11.6 K/UL — HIGH (ref 3.8–10.5)
WBC # BLD: 14.3 K/UL — HIGH (ref 3.8–10.5)
WBC # FLD AUTO: 11.6 K/UL — HIGH (ref 3.8–10.5)
WBC # FLD AUTO: 14.3 K/UL — HIGH (ref 3.8–10.5)

## 2018-03-13 PROCEDURE — 71045 X-RAY EXAM CHEST 1 VIEW: CPT | Mod: 26

## 2018-03-13 PROCEDURE — 71045 X-RAY EXAM CHEST 1 VIEW: CPT | Mod: 26,77

## 2018-03-13 PROCEDURE — 93010 ELECTROCARDIOGRAM REPORT: CPT

## 2018-03-13 PROCEDURE — 99292 CRITICAL CARE ADDL 30 MIN: CPT

## 2018-03-13 PROCEDURE — 99291 CRITICAL CARE FIRST HOUR: CPT

## 2018-03-13 RX ORDER — METOPROLOL TARTRATE 50 MG
25 TABLET ORAL EVERY 12 HOURS
Qty: 0 | Refills: 0 | Status: DISCONTINUED | OUTPATIENT
Start: 2018-03-13 | End: 2018-03-13

## 2018-03-13 RX ORDER — INSULIN LISPRO 100/ML
7 VIAL (ML) SUBCUTANEOUS
Qty: 0 | Refills: 0 | Status: DISCONTINUED | OUTPATIENT
Start: 2018-03-13 | End: 2018-03-15

## 2018-03-13 RX ORDER — CEFUROXIME AXETIL 250 MG
1500 TABLET ORAL EVERY 8 HOURS
Qty: 0 | Refills: 0 | Status: DISCONTINUED | OUTPATIENT
Start: 2018-03-13 | End: 2018-03-14

## 2018-03-13 RX ORDER — INSULIN GLARGINE 100 [IU]/ML
20 INJECTION, SOLUTION SUBCUTANEOUS AT BEDTIME
Qty: 0 | Refills: 0 | Status: DISCONTINUED | OUTPATIENT
Start: 2018-03-13 | End: 2018-03-15

## 2018-03-13 RX ORDER — INSULIN LISPRO 100/ML
VIAL (ML) SUBCUTANEOUS AT BEDTIME
Qty: 0 | Refills: 0 | Status: DISCONTINUED | OUTPATIENT
Start: 2018-03-13 | End: 2018-03-13

## 2018-03-13 RX ORDER — METOPROLOL TARTRATE 50 MG
25 TABLET ORAL EVERY 8 HOURS
Qty: 0 | Refills: 0 | Status: DISCONTINUED | OUTPATIENT
Start: 2018-03-13 | End: 2018-03-17

## 2018-03-13 RX ORDER — FUROSEMIDE 40 MG
40 TABLET ORAL ONCE
Qty: 0 | Refills: 0 | Status: COMPLETED | OUTPATIENT
Start: 2018-03-13 | End: 2018-03-13

## 2018-03-13 RX ORDER — INSULIN LISPRO 100/ML
VIAL (ML) SUBCUTANEOUS EVERY 4 HOURS
Qty: 0 | Refills: 0 | Status: DISCONTINUED | OUTPATIENT
Start: 2018-03-13 | End: 2018-03-16

## 2018-03-13 RX ORDER — ACETAMINOPHEN 500 MG
1000 TABLET ORAL ONCE
Qty: 0 | Refills: 0 | Status: COMPLETED | OUTPATIENT
Start: 2018-03-13 | End: 2018-03-13

## 2018-03-13 RX ORDER — INSULIN LISPRO 100/ML
VIAL (ML) SUBCUTANEOUS
Qty: 0 | Refills: 0 | Status: DISCONTINUED | OUTPATIENT
Start: 2018-03-13 | End: 2018-03-13

## 2018-03-13 RX ORDER — INSULIN HUMAN 100 [IU]/ML
INJECTION, SOLUTION SUBCUTANEOUS EVERY 4 HOURS
Qty: 0 | Refills: 0 | Status: DISCONTINUED | OUTPATIENT
Start: 2018-03-13 | End: 2018-03-13

## 2018-03-13 RX ORDER — SODIUM CHLORIDE 9 MG/ML
3 INJECTION INTRAMUSCULAR; INTRAVENOUS; SUBCUTANEOUS EVERY 8 HOURS
Qty: 0 | Refills: 0 | Status: DISCONTINUED | OUTPATIENT
Start: 2018-03-13 | End: 2018-03-17

## 2018-03-13 RX ADMIN — ATORVASTATIN CALCIUM 40 MILLIGRAM(S): 80 TABLET, FILM COATED ORAL at 22:09

## 2018-03-13 RX ADMIN — Medication 40 MILLIGRAM(S): at 02:20

## 2018-03-13 RX ADMIN — Medication 25 MILLIGRAM(S): at 22:09

## 2018-03-13 RX ADMIN — CLOPIDOGREL BISULFATE 75 MILLIGRAM(S): 75 TABLET, FILM COATED ORAL at 12:41

## 2018-03-13 RX ADMIN — OXYCODONE AND ACETAMINOPHEN 2 TABLET(S): 5; 325 TABLET ORAL at 09:57

## 2018-03-13 RX ADMIN — PANTOPRAZOLE SODIUM 40 MILLIGRAM(S): 20 TABLET, DELAYED RELEASE ORAL at 06:35

## 2018-03-13 RX ADMIN — Medication 2: at 20:39

## 2018-03-13 RX ADMIN — HEPARIN SODIUM 5000 UNIT(S): 5000 INJECTION INTRAVENOUS; SUBCUTANEOUS at 15:59

## 2018-03-13 RX ADMIN — Medication 25 MILLIGRAM(S): at 12:42

## 2018-03-13 RX ADMIN — HEPARIN SODIUM 5000 UNIT(S): 5000 INJECTION INTRAVENOUS; SUBCUTANEOUS at 22:10

## 2018-03-13 RX ADMIN — Medication 100 MILLIGRAM(S): at 01:32

## 2018-03-13 RX ADMIN — OXYCODONE AND ACETAMINOPHEN 2 TABLET(S): 5; 325 TABLET ORAL at 17:20

## 2018-03-13 RX ADMIN — Medication 7 UNIT(S): at 20:39

## 2018-03-13 RX ADMIN — Medication 81 MILLIGRAM(S): at 12:41

## 2018-03-13 RX ADMIN — Medication 100 MILLIGRAM(S): at 06:35

## 2018-03-13 RX ADMIN — OXYCODONE AND ACETAMINOPHEN 2 TABLET(S): 5; 325 TABLET ORAL at 09:42

## 2018-03-13 RX ADMIN — Medication 100 MILLIGRAM(S): at 22:10

## 2018-03-13 RX ADMIN — INSULIN GLARGINE 20 UNIT(S): 100 INJECTION, SOLUTION SUBCUTANEOUS at 22:41

## 2018-03-13 RX ADMIN — Medication 4: at 22:10

## 2018-03-13 RX ADMIN — Medication 100 MILLIGRAM(S): at 09:41

## 2018-03-13 RX ADMIN — Medication 100 MILLIGRAM(S): at 16:56

## 2018-03-13 RX ADMIN — Medication 100 MILLIGRAM(S): at 15:59

## 2018-03-13 RX ADMIN — OXYCODONE AND ACETAMINOPHEN 2 TABLET(S): 5; 325 TABLET ORAL at 17:50

## 2018-03-13 RX ADMIN — HEPARIN SODIUM 5000 UNIT(S): 5000 INJECTION INTRAVENOUS; SUBCUTANEOUS at 06:35

## 2018-03-13 RX ADMIN — Medication 100 MILLIGRAM(S): at 22:09

## 2018-03-13 RX ADMIN — SODIUM CHLORIDE 3 MILLILITER(S): 9 INJECTION INTRAMUSCULAR; INTRAVENOUS; SUBCUTANEOUS at 21:53

## 2018-03-13 RX ADMIN — OXYCODONE AND ACETAMINOPHEN 1 TABLET(S): 5; 325 TABLET ORAL at 22:40

## 2018-03-13 RX ADMIN — OXYCODONE AND ACETAMINOPHEN 1 TABLET(S): 5; 325 TABLET ORAL at 22:09

## 2018-03-13 RX ADMIN — Medication 25 MICROGRAM(S): at 06:35

## 2018-03-13 RX ADMIN — Medication 400 MILLIGRAM(S): at 05:30

## 2018-03-13 RX ADMIN — Medication 25 MILLIGRAM(S): at 00:15

## 2018-03-13 NOTE — PROGRESS NOTE ADULT - SUBJECTIVE AND OBJECTIVE BOX
Chief complaint  Patient is a 53y old  Female who presents with a chief complaint of I need heart surgery (08 Mar 2018 17:32)   Review of systems  Patient in bed, looks comfortable, no fever, no hypoglycemia.    Labs and Fingersticks  CAPILLARY BLOOD GLUCOSE  122 (13 Mar 2018 16:00)  101 (13 Mar 2018 13:48)  102 (13 Mar 2018 12:24)  157 (13 Mar 2018 11:00)  163 (13 Mar 2018 09:00)  108 (13 Mar 2018 08:00)  111 (13 Mar 2018 07:00)  118 (13 Mar 2018 06:00)  112 (13 Mar 2018 05:00)  110 (13 Mar 2018 04:00)  106 (13 Mar 2018 03:00)  107 (13 Mar 2018 02:00)  107 (13 Mar 2018 01:00)  103 (13 Mar 2018 00:00)  113 (12 Mar 2018 23:00)  127 (12 Mar 2018 22:00)  132 (12 Mar 2018 21:00)  123 (12 Mar 2018 20:30)  99 (12 Mar 2018 20:00)      POCT Blood Glucose.: 101 mg/dL (13 Mar 2018 13:47)  POCT Blood Glucose.: 102 mg/dL (13 Mar 2018 12:24)  POCT Blood Glucose.: 163 mg/dL (13 Mar 2018 09:14)  POCT Blood Glucose.: 108 mg/dL (13 Mar 2018 08:12)  POCT Blood Glucose.: 111 mg/dL (13 Mar 2018 06:50)  POCT Blood Glucose.: 118 mg/dL (13 Mar 2018 06:19)  POCT Blood Glucose.: 112 mg/dL (13 Mar 2018 05:10)  POCT Blood Glucose.: 110 mg/dL (13 Mar 2018 04:19)  POCT Blood Glucose.: 76 mg/dL (13 Mar 2018 04:16)  POCT Blood Glucose.: 106 mg/dL (13 Mar 2018 02:54)  POCT Blood Glucose.: 107 mg/dL (13 Mar 2018 02:05)  POCT Blood Glucose.: 107 mg/dL (13 Mar 2018 01:13)  POCT Blood Glucose.: 107 mg/dL (13 Mar 2018 00:13)  POCT Blood Glucose.: 113 mg/dL (12 Mar 2018 23:29)  POCT Blood Glucose.: 127 mg/dL (12 Mar 2018 22:05)  POCT Blood Glucose.: 123 mg/dL (12 Mar 2018 20:34)  POCT Blood Glucose.: 99 mg/dL (12 Mar 2018 20:10)      Anion Gap, Serum: 14 (03-13 @ 17:32)  Anion Gap, Serum: 14 (03-13 @ 01:35)  Anion Gap, Serum: 12 (03-12 @ 01:54)      Calcium, Total Serum: 8.1 <L> (03-13 @ 17:32)  Calcium, Total Serum: 8.5 (03-13 @ 01:35)  Calcium, Total Serum: 8.5 (03-12 @ 01:54)  Albumin, Serum: 3.0 <L> (03-13 @ 17:32)  Albumin, Serum: 2.9 <L> (03-12 @ 01:54)    Alanine Aminotransferase (ALT/SGPT): 31 (03-13 @ 17:32)  Alanine Aminotransferase (ALT/SGPT): 30 (03-12 @ 01:54)  Alkaline Phosphatase, Serum: 38 <L> (03-13 @ 17:32)  Alkaline Phosphatase, Serum: 27 <L> (03-12 @ 01:54)  Aspartate Aminotransferase (AST/SGOT): 64 <H> (03-13 @ 17:32)  Aspartate Aminotransferase (AST/SGOT): 105 <H> (03-12 @ 01:54)        03-13    139  |  103  |  36<H>  ----------------------------<  149<H>  4.2   |  22  |  1.38<H>    Ca    8.1<L>      13 Mar 2018 17:32  Phos  5.4     03-12  Mg     2.1     03-12    TPro  6.1  /  Alb  3.0<L>  /  TBili  0.8  /  DBili  x   /  AST  64<H>  /  ALT  31  /  AlkPhos  38<L>  03-13                        9.3    14.3  )-----------( 121      ( 13 Mar 2018 17:32 )             26.1     Medications  MEDICATIONS  (STANDING):  aspirin enteric coated 81 milliGRAM(s) Oral daily  atorvastatin 40 milliGRAM(s) Oral at bedtime  cefuroxime  IVPB 1500 milliGRAM(s) IV Intermittent every 8 hours  clopidogrel Tablet 75 milliGRAM(s) Oral daily  dextrose 50% Injectable 50 milliLiter(s) IV Push every 15 minutes  dextrose 50% Injectable 25 milliLiter(s) IV Push every 15 minutes  docusate sodium 100 milliGRAM(s) Oral three times a day  heparin  Injectable 5000 Unit(s) SubCutaneous every 8 hours  insulin glargine Injectable (LANTUS) 20 Unit(s) SubCutaneous at bedtime  insulin lispro (HumaLOG) corrective regimen sliding scale   SubCutaneous three times a day before meals  insulin lispro (HumaLOG) corrective regimen sliding scale   SubCutaneous at bedtime  insulin lispro Injectable (HumaLOG) 7 Unit(s) SubCutaneous three times a day before meals  levothyroxine 25 MICROGram(s) Oral daily  metoprolol     tartrate 25 milliGRAM(s) Oral every 8 hours  pantoprazole    Tablet 40 milliGRAM(s) Oral before breakfast  sodium chloride 0.9% lock flush 3 milliLiter(s) IV Push every 8 hours      Physical Exam  General: Patient comfortable in bed  Vital Signs Last 12 Hrs  T(F): 97.9 (03-13-18 @ 19:00), Max: 100.6 (03-13-18 @ 08:00)  HR: 76 (03-13-18 @ 19:00) (73 - 90)  BP: 122/76 (03-13-18 @ 19:00) (114/62 - 122/76)  BP(mean): 91 (03-13-18 @ 19:00) (82 - 91)  RR: 22 (03-13-18 @ 19:00) (20 - 41)  SpO2: 93% (03-13-18 @ 19:00) (91% - 100%)  Neck: No palpable thyroid nodules.  CVS: S1S2, No murmurs  Respiratory: No wheezing, no crepitations  GI: Abdomen soft, bowel sounds positive  Musculoskeletal:  edema lower extremities.   Skin: No skin rashes, no ecchymosis    Diagnostics

## 2018-03-13 NOTE — PHYSICAL THERAPY INITIAL EVALUATION ADULT - ADDITIONAL COMMENTS
Pt lives w/  in a single level pvt house w/ 2 steps x 2 to enter. PTA indep w/o an assist device for all ADLs

## 2018-03-13 NOTE — PROGRESS NOTE ADULT - SUBJECTIVE AND OBJECTIVE BOX
Subjective Hello OOB in chair no acute distress    VITAL SIGNS    Telemetry: NSR 70-90    Vital Signs Last 24 Hrs  T(C): 36.8 (18 @ 20:29), Max: 38.3 (18 @ 04:00)  T(F): 98.2 (18 @ 20:29), Max: 100.9 (18 @ 04:00)  HR: 83 (18 @ 20:29) (73 - 93)  BP: 117/77 (18 @ 20:29) (114/62 - 122/76)  RR: 20 (18 @ 20:29) (20 - 41)  SpO2: 95% (18 @ 20:29) (91% - 100%)              @ 07:01  -   @ 07:00  --------------------------------------------------------  IN: 843.7 mL / OUT: 1114 mL / NET: -270.3 mL     @ 07:01  -   @ 20:47  --------------------------------------------------------  IN: 780 mL / OUT: 400 mL / NET: 380 mL      Daily Weight in k.6 (13 Mar 2018 00:00)  CAPILLARY BLOOD GLUCOSE  122 (13 Mar 2018 16:00)  101 (13 Mar 2018 13:48)    POCT Blood Glucose.: 101 mg/dL (13 Mar 2018 13:47)  MEDICATIONS  (STANDING):  aspirin enteric coated 81 milliGRAM(s) Oral daily  atorvastatin 40 milliGRAM(s) Oral at bedtime  cefuroxime  IVPB 1500 milliGRAM(s) IV Intermittent every 8 hours  clopidogrel Tablet 75 milliGRAM(s) Oral daily  docusate sodium 100 milliGRAM(s) Oral three times a day  heparin  Injectable 5000 Unit(s) SubCutaneous every 8 hours  insulin glargine Injectable (LANTUS) 20 Unit(s) SubCutaneous at bedtime  insulin lispro (HumaLOG) corrective regimen sliding scale   SubCutaneous three times a day before meals  insulin lispro (HumaLOG) corrective regimen sliding scale   SubCutaneous at bedtime  insulin lispro Injectable (HumaLOG) 7 Unit(s) SubCutaneous three times a day before meals  levothyroxine 25 MICROGram(s) Oral daily  metoprolol     tartrate 25 milliGRAM(s) Oral every 8 hours  pantoprazole    Tablet 40 milliGRAM(s) Oral before breakfast  sodium chloride 0.9% lock flush 3 milliLiter(s) IV Push every 8 hours    MEDICATIONS  (PRN):  oxyCODONE    5 mG/acetaminophen 325 mG 1 Tablet(s) Oral every 4 hours PRN Moderate Pain (4 - 6)  oxyCODONE    5 mG/acetaminophen 325 mG 2 Tablet(s) Oral every 6 hours PRN Severe Pain (7 - 10)  Drains:          R Pleural  [x  ]  Drainage: 30/shift  Pacing Wires        [ x ]   Settings:    off           PHYSICAL EXAM  Neurology: alert and oriented x 3, nonfocal, no gross deficits  CV : S1 S2 RRR  Sternal Wound :  CDI Sternum, Stable  Lungs: B/L CTA poor effort  Abdomen: soft, nontender, nondistended, positive bowel sounds, last bowel movement pre op  :  Voids        Extremities:  B/L LE ace wrapped + DP      Physical Therapy Rec:  pending recommendation    Discussed with Cardiothoracic Team at AM round  s.

## 2018-03-13 NOTE — PROGRESS NOTE ADULT - SUBJECTIVE AND OBJECTIVE BOX
CARMENCITA WINKLER   MRN#: [id number]     The patient is a 53y Female who was seen, evaluated, & examined with the CTICU staff on rounds with a multidisciplinary care plan formulated and implemented.  All available clinical, laboratory, radiographic, pharmacologic, and electrocardiographic data were reviewed & analyzed.      The patient was still in the CTICU in critical condition secondary to persistent cardiopulmonary dysfunction, hemodynamically significant hypovolemia/anemia-shock, acute postperative blood loss anemia, and uncontrolled Type II Diabetes mellitus-stress hyperglycemia.      Respiratory status required supplemental oxygen, the following of ABG’s with A-line monitoring, and continuous pulse oximetry monitoring for support & to evaluate for & prevent further decompensation seconday to persistent cardiopulmonary dysfunction.    Invasive hemodynamic monitoring with an A-line was required for the following of continuous MAP/BP monitoring to ensure adequate cardiovascular support and to evaluate for & help prevent decompensation while receiving intermittent volume expansion and recent cessation of the an IV Levophed infusion secondary to resolving hemodynamically significant hypovolemia-shock.  Invasive hemodynamic monitoring with the A-line catheter was subsequently discontinued following cessation of the IV Levophed drip.      Metabolic stability, postprocedure hyperglycemia, & infection prophylaxis required intermittent regular Insulin & the following of serial glucose levels to help achieve & maintain euglycemia.      Patient required critical care management and I provided 30 minutes of non-continuous care to the patient.  Discussed at length with the CTICU staff and helped coordinate care. CARMENCITA WINKLER   MRN#: [id number]     The patient is a 53y Female who was seen, evaluated, & examined with the CTICU staff on rounds with a multidisciplinary care plan formulated and implemented.  All available clinical, laboratory, radiographic, pharmacologic, and electrocardiographic data were reviewed & analyzed.      The patient was still in the CTICU in critical condition secondary to persistent cardiopulmonary dysfunction, post-chest tube removal left apical PTX, acute postoperative blood loss anemia, acute postoperative kidney injury, and uncontrolled Type II Diabetes mellitus-stress hyperglycemia.      Respiratory status required supplemental oxygen-NRB facemask, the following of ABG’s with A-line monitoring, and continuous pulse oximetry monitoring for support & to evaluate for & prevent further decompensation secondary to persistent cardiopulmonary dysfunction and post-chest tube removal left apical PTX,.    Invasive hemodynamic monitoring with an A-line was required for the following of continuous MAP/BP monitoring to ensure adequate cardiovascular support and to evaluate for & help prevent decompensation while receiving intermittent IV Lasix and blood transfusion secondary to resolving hemodynamically significant anemia/hypovolemia-shock, acute postoperative blood loss anemia, and acute postoperative kidney injury.      Metabolic stability, postprocedure hyperglycemia, & infection prophylaxis required intermittent regular Insulin & the following of serial glucose levels to help achieve & maintain euglycemia.      Patient required critical care management and I provided 80 minutes of non-continuous care to the patient.  Discussed at length with the CTICU staff and helped coordinate care.

## 2018-03-13 NOTE — PHYSICAL THERAPY INITIAL EVALUATION ADULT - PLANNED THERAPY INTERVENTIONS, PT EVAL
gait training/stairs LTG- 1. Pt will be able to negotiate 4 steps using HR And reciprocal gait indep within 2 wks/bed mobility training/transfer training

## 2018-03-14 LAB
ANION GAP SERPL CALC-SCNC: 13 MMOL/L — SIGNIFICANT CHANGE UP (ref 5–17)
BUN SERPL-MCNC: 38 MG/DL — HIGH (ref 7–23)
CALCIUM SERPL-MCNC: 8.2 MG/DL — LOW (ref 8.4–10.5)
CHLORIDE SERPL-SCNC: 105 MMOL/L — SIGNIFICANT CHANGE UP (ref 96–108)
CO2 SERPL-SCNC: 23 MMOL/L — SIGNIFICANT CHANGE UP (ref 22–31)
CREAT SERPL-MCNC: 1.22 MG/DL — SIGNIFICANT CHANGE UP (ref 0.5–1.3)
GLUCOSE BLDC GLUCOMTR-MCNC: 138 MG/DL — HIGH (ref 70–99)
GLUCOSE BLDC GLUCOMTR-MCNC: 143 MG/DL — HIGH (ref 70–99)
GLUCOSE BLDC GLUCOMTR-MCNC: 145 MG/DL — HIGH (ref 70–99)
GLUCOSE BLDC GLUCOMTR-MCNC: 152 MG/DL — HIGH (ref 70–99)
GLUCOSE BLDC GLUCOMTR-MCNC: 158 MG/DL — HIGH (ref 70–99)
GLUCOSE BLDC GLUCOMTR-MCNC: 177 MG/DL — HIGH (ref 70–99)
GLUCOSE SERPL-MCNC: 116 MG/DL — HIGH (ref 70–99)
HCT VFR BLD CALC: 26.7 % — LOW (ref 34.5–45)
HGB BLD-MCNC: 9.1 G/DL — LOW (ref 11.5–15.5)
MCHC RBC-ENTMCNC: 31.2 PG — SIGNIFICANT CHANGE UP (ref 27–34)
MCHC RBC-ENTMCNC: 34.2 GM/DL — SIGNIFICANT CHANGE UP (ref 32–36)
MCV RBC AUTO: 91.2 FL — SIGNIFICANT CHANGE UP (ref 80–100)
PLATELET # BLD AUTO: 141 K/UL — LOW (ref 150–400)
POTASSIUM SERPL-MCNC: 4.2 MMOL/L — SIGNIFICANT CHANGE UP (ref 3.5–5.3)
POTASSIUM SERPL-SCNC: 4.2 MMOL/L — SIGNIFICANT CHANGE UP (ref 3.5–5.3)
RBC # BLD: 2.92 M/UL — LOW (ref 3.8–5.2)
RBC # FLD: 13.9 % — SIGNIFICANT CHANGE UP (ref 10.3–14.5)
SODIUM SERPL-SCNC: 141 MMOL/L — SIGNIFICANT CHANGE UP (ref 135–145)
WBC # BLD: 14 K/UL — HIGH (ref 3.8–10.5)
WBC # FLD AUTO: 14 K/UL — HIGH (ref 3.8–10.5)

## 2018-03-14 PROCEDURE — 71045 X-RAY EXAM CHEST 1 VIEW: CPT | Mod: 26,77

## 2018-03-14 PROCEDURE — 71045 X-RAY EXAM CHEST 1 VIEW: CPT | Mod: 26

## 2018-03-14 RX ORDER — FUROSEMIDE 40 MG
40 TABLET ORAL DAILY
Qty: 0 | Refills: 0 | Status: DISCONTINUED | OUTPATIENT
Start: 2018-03-14 | End: 2018-03-17

## 2018-03-14 RX ORDER — POTASSIUM CHLORIDE 20 MEQ
20 PACKET (EA) ORAL DAILY
Qty: 0 | Refills: 0 | Status: DISCONTINUED | OUTPATIENT
Start: 2018-03-14 | End: 2018-03-17

## 2018-03-14 RX ADMIN — CLOPIDOGREL BISULFATE 75 MILLIGRAM(S): 75 TABLET, FILM COATED ORAL at 11:48

## 2018-03-14 RX ADMIN — Medication 20 MILLIEQUIVALENT(S): at 11:51

## 2018-03-14 RX ADMIN — Medication 4: at 15:03

## 2018-03-14 RX ADMIN — Medication 4: at 02:15

## 2018-03-14 RX ADMIN — OXYCODONE AND ACETAMINOPHEN 2 TABLET(S): 5; 325 TABLET ORAL at 20:22

## 2018-03-14 RX ADMIN — SODIUM CHLORIDE 3 MILLILITER(S): 9 INJECTION INTRAMUSCULAR; INTRAVENOUS; SUBCUTANEOUS at 21:07

## 2018-03-14 RX ADMIN — OXYCODONE AND ACETAMINOPHEN 2 TABLET(S): 5; 325 TABLET ORAL at 05:37

## 2018-03-14 RX ADMIN — Medication 7 UNIT(S): at 07:53

## 2018-03-14 RX ADMIN — OXYCODONE AND ACETAMINOPHEN 2 TABLET(S): 5; 325 TABLET ORAL at 19:52

## 2018-03-14 RX ADMIN — HEPARIN SODIUM 5000 UNIT(S): 5000 INJECTION INTRAVENOUS; SUBCUTANEOUS at 21:03

## 2018-03-14 RX ADMIN — SODIUM CHLORIDE 3 MILLILITER(S): 9 INJECTION INTRAMUSCULAR; INTRAVENOUS; SUBCUTANEOUS at 05:20

## 2018-03-14 RX ADMIN — Medication 7 UNIT(S): at 16:58

## 2018-03-14 RX ADMIN — PANTOPRAZOLE SODIUM 40 MILLIGRAM(S): 20 TABLET, DELAYED RELEASE ORAL at 05:21

## 2018-03-14 RX ADMIN — Medication 25 MICROGRAM(S): at 05:20

## 2018-03-14 RX ADMIN — Medication 100 MILLIGRAM(S): at 21:03

## 2018-03-14 RX ADMIN — Medication 25 MILLIGRAM(S): at 11:48

## 2018-03-14 RX ADMIN — Medication 4: at 22:01

## 2018-03-14 RX ADMIN — ATORVASTATIN CALCIUM 40 MILLIGRAM(S): 80 TABLET, FILM COATED ORAL at 21:03

## 2018-03-14 RX ADMIN — Medication 40 MILLIGRAM(S): at 11:47

## 2018-03-14 RX ADMIN — HEPARIN SODIUM 5000 UNIT(S): 5000 INJECTION INTRAVENOUS; SUBCUTANEOUS at 13:21

## 2018-03-14 RX ADMIN — OXYCODONE AND ACETAMINOPHEN 1 TABLET(S): 5; 325 TABLET ORAL at 13:19

## 2018-03-14 RX ADMIN — OXYCODONE AND ACETAMINOPHEN 1 TABLET(S): 5; 325 TABLET ORAL at 13:45

## 2018-03-14 RX ADMIN — Medication 100 MILLIGRAM(S): at 05:21

## 2018-03-14 RX ADMIN — INSULIN GLARGINE 20 UNIT(S): 100 INJECTION, SOLUTION SUBCUTANEOUS at 22:00

## 2018-03-14 RX ADMIN — HEPARIN SODIUM 5000 UNIT(S): 5000 INJECTION INTRAVENOUS; SUBCUTANEOUS at 05:21

## 2018-03-14 RX ADMIN — Medication 81 MILLIGRAM(S): at 11:48

## 2018-03-14 RX ADMIN — SODIUM CHLORIDE 3 MILLILITER(S): 9 INJECTION INTRAMUSCULAR; INTRAVENOUS; SUBCUTANEOUS at 14:49

## 2018-03-14 RX ADMIN — Medication 100 MILLIGRAM(S): at 13:21

## 2018-03-14 RX ADMIN — Medication 25 MILLIGRAM(S): at 05:21

## 2018-03-14 RX ADMIN — Medication 7 UNIT(S): at 11:47

## 2018-03-14 RX ADMIN — Medication 25 MILLIGRAM(S): at 21:03

## 2018-03-14 RX ADMIN — OXYCODONE AND ACETAMINOPHEN 2 TABLET(S): 5; 325 TABLET ORAL at 06:00

## 2018-03-14 NOTE — PROGRESS NOTE ADULT - SUBJECTIVE AND OBJECTIVE BOX
VITAL SIGNS    Subjective: Patient states: "Hello, I feel weak"     Telemetry: NSR 70-80      Vital Signs Last 24 Hrs  T(C): 36.3 (03-14-18 @ 13:40), Max: 37.2 (03-14-18 @ 03:33)  T(F): 97.3 (03-14-18 @ 13:40), Max: 98.9 (03-14-18 @ 03:33)  HR: 78 (03-14-18 @ 13:40) (78 - 83)  BP: 102/62 (03-14-18 @ 13:40) (102/62 - 117/77)  RR: 18 (03-14-18 @ 13:40) (18 - 20)  SpO2: 97% (03-14-18 @ 13:40) (92% - 97%)           03-13 @ 07:01  -  03-14 @ 07:00  --------------------------------------------------------  IN: 830 mL / OUT: 1186 mL / NET: -356 mL    03-14 @ 07:01  -  03-14 @ 19:14  --------------------------------------------------------  IN: 480 mL / OUT: 550 mL / NET: -70 mL    CAPILLARY BLOOD GLUCOSE    POCT Blood Glucose.: 143 mg/dL (14 Mar 2018 16:36)  POCT Blood Glucose.: 177 mg/dL (14 Mar 2018 14:56)  POCT Blood Glucose.: 145 mg/dL (14 Mar 2018 11:30)  POCT Blood Glucose.: 138 mg/dL (14 Mar 2018 07:34)  POCT Blood Glucose.: 152 mg/dL (14 Mar 2018 02:06)  POCT Blood Glucose.: 183 mg/dL (13 Mar 2018 22:04)  POCT Blood Glucose.: 165 mg/dL (13 Mar 2018 20:36)        PHYSICAL EXAM    Neurology: alert and oriented x 3, nonfocal, no gross deficits    CV: (+) S1 and S2, No murmurs, rubs, gallops or clicks     Sternal Wound:  MSI -->CDI , sternum stable; PW --> EPM --> Off; Right Pleural CT --> Pleural vac --> LWS; negative air leak; negative for subcutaneous emphysema      Lungs: CTA B/L; Diminished at base B/L     Abdomen: soft, nontender, nondistended, positive bowel sounds, (+) Flatus; (+) BM     :  Voiding               Extremities:  B/L LE (+) 1-2 edema; negative calf tenderness; (+) 2 DP palpable        aspirin enteric coated 81 milliGRAM(s) Oral daily  atorvastatin 40 milliGRAM(s) Oral at bedtime  clopidogrel Tablet 75 milliGRAM(s) Oral daily  docusate sodium 100 milliGRAM(s) Oral three times a day  furosemide Tablet 40 milliGRAM(s) Oral daily  heparin Injectable 5000 Unit(s) SubCutaneous every 8 hours  insulin glargine Injectable (LANTUS) 20 Unit(s) SubCutaneous at bedtime  insulin lispro (HumaLOG) corrective regimen sliding scale   SubCutaneous every 4 hours  insulin lispro Injectable (HumaLOG) 7 Unit(s) SubCutaneous three times a day before meals  levothyroxine 25 MICROGram(s) Oral daily  metoprolol tartrate 25 milliGRAM(s) Oral every 8 hours  oxyCODONE 5 mG/acetaminophen 325 mG 1 Tablet(s) Oral every 4 hours PRN  oxyCODONE 5 mG/acetaminophen 325 mG 2 Tablet(s) Oral every 6 hours PRN  pantoprazole Tablet 40 milliGRAM(s) Oral before breakfast  potassium chloride Tablet ER 20 milliEquivalent(s) Oral daily    Physical Therapy Rec:   Home  [  ]   Home w/ PT  [X ]  Rehab  [  ]    Discussed with Cardiothoracic Team at AM rounds.

## 2018-03-14 NOTE — PROGRESS NOTE ADULT - SUBJECTIVE AND OBJECTIVE BOX
Chief complaint  Patient is a 53y old  Female who presents with a chief complaint of I need heart surgery (08 Mar 2018 17:32)   Review of systems  Patient in bed, looks comfortable, no fever, awake, no hypoglycemia.    Labs and Fingersticks  CAPILLARY BLOOD GLUCOSE  122 (13 Mar 2018 16:00)  101 (13 Mar 2018 13:48)  102 (13 Mar 2018 12:24)  157 (13 Mar 2018 11:00)      POCT Blood Glucose.: 138 mg/dL (14 Mar 2018 07:34)  POCT Blood Glucose.: 152 mg/dL (14 Mar 2018 02:06)  POCT Blood Glucose.: 183 mg/dL (13 Mar 2018 22:04)  POCT Blood Glucose.: 165 mg/dL (13 Mar 2018 20:36)  POCT Blood Glucose.: 101 mg/dL (13 Mar 2018 13:47)  POCT Blood Glucose.: 102 mg/dL (13 Mar 2018 12:24)      Anion Gap, Serum: 13 (03-14 @ 05:33)  Anion Gap, Serum: 14 (03-13 @ 17:32)  Anion Gap, Serum: 14 (03-13 @ 01:35)      Calcium, Total Serum: 8.2 <L> (03-14 @ 05:33)  Calcium, Total Serum: 8.1 <L> (03-13 @ 17:32)  Calcium, Total Serum: 8.5 (03-13 @ 01:35)  Albumin, Serum: 3.0 <L> (03-13 @ 17:32)    Alanine Aminotransferase (ALT/SGPT): 31 (03-13 @ 17:32)  Alkaline Phosphatase, Serum: 38 <L> (03-13 @ 17:32)  Aspartate Aminotransferase (AST/SGOT): 64 <H> (03-13 @ 17:32)        03-14    141  |  105  |  38<H>  ----------------------------<  116<H>  4.2   |  23  |  1.22    Ca    8.2<L>      14 Mar 2018 05:33    TPro  6.1  /  Alb  3.0<L>  /  TBili  0.8  /  DBili  x   /  AST  64<H>  /  ALT  31  /  AlkPhos  38<L>  03-13                        9.1    14.0  )-----------( 141      ( 14 Mar 2018 05:33 )             26.7     Medications  MEDICATIONS  (STANDING):  aspirin enteric coated 81 milliGRAM(s) Oral daily  atorvastatin 40 milliGRAM(s) Oral at bedtime  clopidogrel Tablet 75 milliGRAM(s) Oral daily  dextrose 50% Injectable 50 milliLiter(s) IV Push every 15 minutes  dextrose 50% Injectable 25 milliLiter(s) IV Push every 15 minutes  docusate sodium 100 milliGRAM(s) Oral three times a day  furosemide    Tablet 40 milliGRAM(s) Oral daily  heparin  Injectable 5000 Unit(s) SubCutaneous every 8 hours  insulin glargine Injectable (LANTUS) 20 Unit(s) SubCutaneous at bedtime  insulin lispro (HumaLOG) corrective regimen sliding scale   SubCutaneous every 4 hours  insulin lispro Injectable (HumaLOG) 7 Unit(s) SubCutaneous three times a day before meals  levothyroxine 25 MICROGram(s) Oral daily  metoprolol     tartrate 25 milliGRAM(s) Oral every 8 hours  pantoprazole    Tablet 40 milliGRAM(s) Oral before breakfast  potassium chloride    Tablet ER 20 milliEquivalent(s) Oral daily  sodium chloride 0.9% lock flush 3 milliLiter(s) IV Push every 8 hours      Physical Exam  General: Patient comfortable in bed  Vital Signs Last 12 Hrs  T(F): 98.9 (03-14-18 @ 03:33), Max: 98.9 (03-14-18 @ 03:33)  HR: 82 (03-14-18 @ 03:33) (82 - 82)  BP: 106/69 (03-14-18 @ 03:33) (106/69 - 106/69)  BP(mean): --  RR: 18 (03-14-18 @ 03:33) (18 - 18)  SpO2: 92% (03-14-18 @ 03:33) (92% - 92%)  Neck: No palpable thyroid nodules.  CVS: S1S2, No murmurs  Respiratory: No wheezing, no crepitations  GI: Abdomen soft, bowel sounds positive  Musculoskeletal:  edema lower extremities.   Skin: No skin rashes, no ecchymosis    Diagnostics

## 2018-03-15 LAB
ANION GAP SERPL CALC-SCNC: 13 MMOL/L — SIGNIFICANT CHANGE UP (ref 5–17)
BUN SERPL-MCNC: 34 MG/DL — HIGH (ref 7–23)
CALCIUM SERPL-MCNC: 8.1 MG/DL — LOW (ref 8.4–10.5)
CHLORIDE SERPL-SCNC: 100 MMOL/L — SIGNIFICANT CHANGE UP (ref 96–108)
CO2 SERPL-SCNC: 22 MMOL/L — SIGNIFICANT CHANGE UP (ref 22–31)
CREAT SERPL-MCNC: 0.9 MG/DL — SIGNIFICANT CHANGE UP (ref 0.5–1.3)
GLUCOSE BLDC GLUCOMTR-MCNC: 122 MG/DL — HIGH (ref 70–99)
GLUCOSE BLDC GLUCOMTR-MCNC: 150 MG/DL — HIGH (ref 70–99)
GLUCOSE BLDC GLUCOMTR-MCNC: 153 MG/DL — HIGH (ref 70–99)
GLUCOSE BLDC GLUCOMTR-MCNC: 159 MG/DL — HIGH (ref 70–99)
GLUCOSE BLDC GLUCOMTR-MCNC: 161 MG/DL — HIGH (ref 70–99)
GLUCOSE BLDC GLUCOMTR-MCNC: 163 MG/DL — HIGH (ref 70–99)
GLUCOSE BLDC GLUCOMTR-MCNC: 175 MG/DL — HIGH (ref 70–99)
GLUCOSE SERPL-MCNC: 172 MG/DL — HIGH (ref 70–99)
HCT VFR BLD CALC: 25.8 % — LOW (ref 34.5–45)
HGB BLD-MCNC: 8.7 G/DL — LOW (ref 11.5–15.5)
MCHC RBC-ENTMCNC: 30.8 PG — SIGNIFICANT CHANGE UP (ref 27–34)
MCHC RBC-ENTMCNC: 33.9 GM/DL — SIGNIFICANT CHANGE UP (ref 32–36)
MCV RBC AUTO: 90.9 FL — SIGNIFICANT CHANGE UP (ref 80–100)
PLATELET # BLD AUTO: 177 K/UL — SIGNIFICANT CHANGE UP (ref 150–400)
POTASSIUM SERPL-MCNC: 4.2 MMOL/L — SIGNIFICANT CHANGE UP (ref 3.5–5.3)
POTASSIUM SERPL-SCNC: 4.2 MMOL/L — SIGNIFICANT CHANGE UP (ref 3.5–5.3)
RBC # BLD: 2.84 M/UL — LOW (ref 3.8–5.2)
RBC # FLD: 14 % — SIGNIFICANT CHANGE UP (ref 10.3–14.5)
SODIUM SERPL-SCNC: 135 MMOL/L — SIGNIFICANT CHANGE UP (ref 135–145)
WBC # BLD: 13.9 K/UL — HIGH (ref 3.8–10.5)
WBC # FLD AUTO: 13.9 K/UL — HIGH (ref 3.8–10.5)

## 2018-03-15 RX ORDER — ACETAMINOPHEN 500 MG
650 TABLET ORAL EVERY 6 HOURS
Qty: 0 | Refills: 0 | Status: DISCONTINUED | OUTPATIENT
Start: 2018-03-15 | End: 2018-03-17

## 2018-03-15 RX ORDER — INSULIN GLARGINE 100 [IU]/ML
24 INJECTION, SOLUTION SUBCUTANEOUS AT BEDTIME
Qty: 0 | Refills: 0 | Status: DISCONTINUED | OUTPATIENT
Start: 2018-03-15 | End: 2018-03-17

## 2018-03-15 RX ORDER — INSULIN LISPRO 100/ML
9 VIAL (ML) SUBCUTANEOUS
Qty: 0 | Refills: 0 | Status: DISCONTINUED | OUTPATIENT
Start: 2018-03-15 | End: 2018-03-17

## 2018-03-15 RX ADMIN — Medication 25 MILLIGRAM(S): at 11:57

## 2018-03-15 RX ADMIN — ATORVASTATIN CALCIUM 40 MILLIGRAM(S): 80 TABLET, FILM COATED ORAL at 21:54

## 2018-03-15 RX ADMIN — OXYCODONE AND ACETAMINOPHEN 1 TABLET(S): 5; 325 TABLET ORAL at 21:54

## 2018-03-15 RX ADMIN — Medication 25 MICROGRAM(S): at 05:09

## 2018-03-15 RX ADMIN — Medication 4: at 17:12

## 2018-03-15 RX ADMIN — Medication 7 UNIT(S): at 07:43

## 2018-03-15 RX ADMIN — HEPARIN SODIUM 5000 UNIT(S): 5000 INJECTION INTRAVENOUS; SUBCUTANEOUS at 21:55

## 2018-03-15 RX ADMIN — Medication 4: at 14:42

## 2018-03-15 RX ADMIN — Medication 650 MILLIGRAM(S): at 18:15

## 2018-03-15 RX ADMIN — Medication 25 MILLIGRAM(S): at 05:09

## 2018-03-15 RX ADMIN — Medication 4: at 07:43

## 2018-03-15 RX ADMIN — SODIUM CHLORIDE 3 MILLILITER(S): 9 INJECTION INTRAMUSCULAR; INTRAVENOUS; SUBCUTANEOUS at 05:11

## 2018-03-15 RX ADMIN — SODIUM CHLORIDE 3 MILLILITER(S): 9 INJECTION INTRAMUSCULAR; INTRAVENOUS; SUBCUTANEOUS at 15:57

## 2018-03-15 RX ADMIN — Medication 100 MILLIGRAM(S): at 14:51

## 2018-03-15 RX ADMIN — PANTOPRAZOLE SODIUM 40 MILLIGRAM(S): 20 TABLET, DELAYED RELEASE ORAL at 05:12

## 2018-03-15 RX ADMIN — Medication 40 MILLIGRAM(S): at 05:09

## 2018-03-15 RX ADMIN — OXYCODONE AND ACETAMINOPHEN 1 TABLET(S): 5; 325 TABLET ORAL at 22:30

## 2018-03-15 RX ADMIN — Medication 4: at 21:54

## 2018-03-15 RX ADMIN — Medication 81 MILLIGRAM(S): at 11:57

## 2018-03-15 RX ADMIN — Medication 9 UNIT(S): at 12:14

## 2018-03-15 RX ADMIN — CLOPIDOGREL BISULFATE 75 MILLIGRAM(S): 75 TABLET, FILM COATED ORAL at 11:58

## 2018-03-15 RX ADMIN — INSULIN GLARGINE 24 UNIT(S): 100 INJECTION, SOLUTION SUBCUTANEOUS at 21:54

## 2018-03-15 RX ADMIN — Medication 25 MILLIGRAM(S): at 21:53

## 2018-03-15 RX ADMIN — HEPARIN SODIUM 5000 UNIT(S): 5000 INJECTION INTRAVENOUS; SUBCUTANEOUS at 05:09

## 2018-03-15 RX ADMIN — SODIUM CHLORIDE 3 MILLILITER(S): 9 INJECTION INTRAMUSCULAR; INTRAVENOUS; SUBCUTANEOUS at 22:30

## 2018-03-15 RX ADMIN — Medication 20 MILLIEQUIVALENT(S): at 11:57

## 2018-03-15 RX ADMIN — Medication 100 MILLIGRAM(S): at 05:09

## 2018-03-15 RX ADMIN — Medication 9 UNIT(S): at 17:12

## 2018-03-15 RX ADMIN — HEPARIN SODIUM 5000 UNIT(S): 5000 INJECTION INTRAVENOUS; SUBCUTANEOUS at 14:41

## 2018-03-15 NOTE — PROGRESS NOTE ADULT - SUBJECTIVE AND OBJECTIVE BOX
Chief complaint  Patient is a 53y old  Female who presents with a chief complaint of I need heart surgery (08 Mar 2018 17:32)   Review of systems  Patient in bed, looks comfortable, no fever,  no hypoglycemia.    Labs and Fingersticks  CAPILLARY BLOOD GLUCOSE      POCT Blood Glucose.: 153 mg/dL (15 Mar 2018 07:06)  POCT Blood Glucose.: 163 mg/dL (15 Mar 2018 05:31)  POCT Blood Glucose.: 150 mg/dL (15 Mar 2018 01:56)  POCT Blood Glucose.: 158 mg/dL (14 Mar 2018 21:40)  POCT Blood Glucose.: 143 mg/dL (14 Mar 2018 16:36)  POCT Blood Glucose.: 177 mg/dL (14 Mar 2018 14:56)  POCT Blood Glucose.: 145 mg/dL (14 Mar 2018 11:30)      Anion Gap, Serum: 13 (03-15 @ 06:02)  Anion Gap, Serum: 13 (03-14 @ 05:33)  Anion Gap, Serum: 14 (03-13 @ 17:32)      Calcium, Total Serum: 8.1 <L> (03-15 @ 06:02)  Calcium, Total Serum: 8.2 <L> (03-14 @ 05:33)  Calcium, Total Serum: 8.1 <L> (03-13 @ 17:32)  Albumin, Serum: 3.0 <L> (03-13 @ 17:32)    Alanine Aminotransferase (ALT/SGPT): 31 (03-13 @ 17:32)  Alkaline Phosphatase, Serum: 38 <L> (03-13 @ 17:32)  Aspartate Aminotransferase (AST/SGOT): 64 <H> (03-13 @ 17:32)        03-15    135  |  100  |  34<H>  ----------------------------<  172<H>  4.2   |  22  |  0.90    Ca    8.1<L>      15 Mar 2018 06:02    TPro  6.1  /  Alb  3.0<L>  /  TBili  0.8  /  DBili  x   /  AST  64<H>  /  ALT  31  /  AlkPhos  38<L>  03-13                        8.7    13.9  )-----------( 177      ( 15 Mar 2018 06:02 )             25.8     Medications  MEDICATIONS  (STANDING):  aspirin enteric coated 81 milliGRAM(s) Oral daily  atorvastatin 40 milliGRAM(s) Oral at bedtime  clopidogrel Tablet 75 milliGRAM(s) Oral daily  dextrose 50% Injectable 50 milliLiter(s) IV Push every 15 minutes  dextrose 50% Injectable 25 milliLiter(s) IV Push every 15 minutes  docusate sodium 100 milliGRAM(s) Oral three times a day  furosemide    Tablet 40 milliGRAM(s) Oral daily  heparin  Injectable 5000 Unit(s) SubCutaneous every 8 hours  insulin glargine Injectable (LANTUS) 24 Unit(s) SubCutaneous at bedtime  insulin lispro (HumaLOG) corrective regimen sliding scale   SubCutaneous every 4 hours  insulin lispro Injectable (HumaLOG) 9 Unit(s) SubCutaneous three times a day before meals  levothyroxine 25 MICROGram(s) Oral daily  metoprolol     tartrate 25 milliGRAM(s) Oral every 8 hours  pantoprazole    Tablet 40 milliGRAM(s) Oral before breakfast  potassium chloride    Tablet ER 20 milliEquivalent(s) Oral daily  sodium chloride 0.9% lock flush 3 milliLiter(s) IV Push every 8 hours      Physical Exam  General: Patient comfortable in bed  Vital Signs Last 12 Hrs  T(F): 98.6 (03-15-18 @ 05:00), Max: 98.6 (03-15-18 @ 05:00)  HR: 82 (03-15-18 @ 05:00) (82 - 82)  BP: 137/77 (03-15-18 @ 05:00) (137/77 - 137/77)  BP(mean): --  RR: 18 (03-15-18 @ 05:00) (18 - 18)  SpO2: 95% (03-15-18 @ 05:00) (95% - 95%)  Neck: No palpable thyroid nodules.  CVS: S1S2, No murmurs  Respiratory: No wheezing, no crepitations  GI: Abdomen soft, bowel sounds positive  Musculoskeletal:  edema lower extremities.   Skin: No skin rashes, no ecchymosis    Diagnostics

## 2018-03-15 NOTE — PROGRESS NOTE ADULT - SUBJECTIVE AND OBJECTIVE BOX
VITAL SIGNS    Subjective: Patient states: "Hello; I feel weak"     Telemetry: NSR 70-90    Vital Signs Last 24 Hrs  T(C): 37 (03-15-18 @ 05:00), Max: 37 (03-15-18 @ 05:00)  T(F): 98.6 (03-15-18 @ 05:00), Max: 98.6 (03-15-18 @ 05:00)  HR: 82 (03-15-18 @ 05:00) (78 - 82)  BP: 137/77 (03-15-18 @ 05:00) (102/62 - 137/77)  RR: 18 (03-15-18 @ 05:00) (18 - 18)  SpO2: 95% (03-15-18 @ 05:00) (93% - 97%)           14 @ 07:01  -  03-15 @ 07:00  --------------------------------------------------------  IN: 880 mL / OUT: 1650 mL / NET: -770 mL    Daily     Daily Weight in k.6 (15 Mar 2018 08:30)    CAPILLARY BLOOD GLUCOSE    POCT Blood Glucose.: 153 mg/dL (15 Mar 2018 07:06)  POCT Blood Glucose.: 163 mg/dL (15 Mar 2018 05:31)  POCT Blood Glucose.: 150 mg/dL (15 Mar 2018 01:56)  POCT Blood Glucose.: 158 mg/dL (14 Mar 2018 21:40)  POCT Blood Glucose.: 143 mg/dL (14 Mar 2018 16:36)  POCT Blood Glucose.: 177 mg/dL (14 Mar 2018 14:56)  POCT Blood Glucose.: 145 mg/dL (14 Mar 2018 11:30)        Pacing Wires        [  ]   Settings:                                  Isolated  [ X ]    PHYSICAL EXAM    Neurology: alert and oriented x 3, nonfocal, no gross deficits    CV: (+) S1 and S2, No murmurs, rubs, gallops or clicks     Sternal Wound:  MSI -->CDI , sternum stable    Lungs: CTA B/L     Abdomen: soft, nontender, nondistended, positive bowel sounds, (+) Flatus; (+) BM     :  Voiding               Extremities:  B/L LE (+) edema; negative calf tenderness; (+) 2 DP palpable        aspirin enteric coated 81 milliGRAM(s) Oral daily  atorvastatin 40 milliGRAM(s) Oral at bedtime  clopidogrel Tablet 75 milliGRAM(s) Oral daily  docusate sodium 100 milliGRAM(s) Oral three times a day  furosemide Tablet 40 milliGRAM(s) Oral daily  heparin  Injectable 5000 Unit(s) SubCutaneous every 8 hours  insulin glargine Injectable (LANTUS) 24 Unit(s) SubCutaneous at bedtime  insulin lispro (HumaLOG) corrective regimen sliding scale   SubCutaneous every 4 hours  insulin lispro Injectable (HumaLOG) 9 Unit(s) SubCutaneous three times a day before meals  levothyroxine 25 MICROGram(s) Oral daily  metoprolol  tartrate 25 milliGRAM(s) Oral every 8 hours  oxyCODONE 5 mG/acetaminophen 325 mG 1 Tablet(s) Oral every 4 hours PRN  oxyCODONE 5 mG/acetaminophen 325 mG 2 Tablet(s) Oral every 6 hours PRN  pantoprazole Tablet 40 milliGRAM(s) Oral before breakfast  potassium chloride Tablet ER 20 milliEquivalent(s) Oral daily      Physical Therapy Rec:   Home  [  ]   Home w/ PT  [ X]  Rehab  [  ]    Discussed with Cardiothoracic Team at AM rounds.

## 2018-03-16 LAB
ANION GAP SERPL CALC-SCNC: 11 MMOL/L — SIGNIFICANT CHANGE UP (ref 5–17)
BUN SERPL-MCNC: 26 MG/DL — HIGH (ref 7–23)
CALCIUM SERPL-MCNC: 8.3 MG/DL — LOW (ref 8.4–10.5)
CHLORIDE SERPL-SCNC: 103 MMOL/L — SIGNIFICANT CHANGE UP (ref 96–108)
CO2 SERPL-SCNC: 25 MMOL/L — SIGNIFICANT CHANGE UP (ref 22–31)
CREAT SERPL-MCNC: 0.76 MG/DL — SIGNIFICANT CHANGE UP (ref 0.5–1.3)
GLUCOSE BLDC GLUCOMTR-MCNC: 123 MG/DL — HIGH (ref 70–99)
GLUCOSE BLDC GLUCOMTR-MCNC: 127 MG/DL — HIGH (ref 70–99)
GLUCOSE BLDC GLUCOMTR-MCNC: 131 MG/DL — HIGH (ref 70–99)
GLUCOSE BLDC GLUCOMTR-MCNC: 145 MG/DL — HIGH (ref 70–99)
GLUCOSE BLDC GLUCOMTR-MCNC: 147 MG/DL — HIGH (ref 70–99)
GLUCOSE BLDC GLUCOMTR-MCNC: 174 MG/DL — HIGH (ref 70–99)
GLUCOSE SERPL-MCNC: 130 MG/DL — HIGH (ref 70–99)
HCT VFR BLD CALC: 26.5 % — LOW (ref 34.5–45)
HGB BLD-MCNC: 8.9 G/DL — LOW (ref 11.5–15.5)
MCHC RBC-ENTMCNC: 30.9 PG — SIGNIFICANT CHANGE UP (ref 27–34)
MCHC RBC-ENTMCNC: 33.6 GM/DL — SIGNIFICANT CHANGE UP (ref 32–36)
MCV RBC AUTO: 92.1 FL — SIGNIFICANT CHANGE UP (ref 80–100)
PLATELET # BLD AUTO: 228 K/UL — SIGNIFICANT CHANGE UP (ref 150–400)
POTASSIUM SERPL-MCNC: 3.9 MMOL/L — SIGNIFICANT CHANGE UP (ref 3.5–5.3)
POTASSIUM SERPL-SCNC: 3.9 MMOL/L — SIGNIFICANT CHANGE UP (ref 3.5–5.3)
RBC # BLD: 2.88 M/UL — LOW (ref 3.8–5.2)
RBC # FLD: 14 % — SIGNIFICANT CHANGE UP (ref 10.3–14.5)
SODIUM SERPL-SCNC: 139 MMOL/L — SIGNIFICANT CHANGE UP (ref 135–145)
WBC # BLD: 12.4 K/UL — HIGH (ref 3.8–10.5)
WBC # FLD AUTO: 12.4 K/UL — HIGH (ref 3.8–10.5)

## 2018-03-16 RX ORDER — POTASSIUM CHLORIDE 20 MEQ
20 PACKET (EA) ORAL ONCE
Qty: 0 | Refills: 0 | Status: COMPLETED | OUTPATIENT
Start: 2018-03-16 | End: 2018-03-16

## 2018-03-16 RX ORDER — INSULIN LISPRO 100/ML
VIAL (ML) SUBCUTANEOUS
Qty: 0 | Refills: 0 | Status: DISCONTINUED | OUTPATIENT
Start: 2018-03-16 | End: 2018-03-17

## 2018-03-16 RX ADMIN — OXYCODONE AND ACETAMINOPHEN 1 TABLET(S): 5; 325 TABLET ORAL at 04:05

## 2018-03-16 RX ADMIN — HEPARIN SODIUM 5000 UNIT(S): 5000 INJECTION INTRAVENOUS; SUBCUTANEOUS at 06:19

## 2018-03-16 RX ADMIN — Medication 25 MICROGRAM(S): at 06:18

## 2018-03-16 RX ADMIN — PANTOPRAZOLE SODIUM 40 MILLIGRAM(S): 20 TABLET, DELAYED RELEASE ORAL at 06:19

## 2018-03-16 RX ADMIN — Medication 9 UNIT(S): at 16:59

## 2018-03-16 RX ADMIN — SODIUM CHLORIDE 3 MILLILITER(S): 9 INJECTION INTRAMUSCULAR; INTRAVENOUS; SUBCUTANEOUS at 21:51

## 2018-03-16 RX ADMIN — OXYCODONE AND ACETAMINOPHEN 1 TABLET(S): 5; 325 TABLET ORAL at 22:40

## 2018-03-16 RX ADMIN — Medication 25 MILLIGRAM(S): at 12:25

## 2018-03-16 RX ADMIN — Medication 81 MILLIGRAM(S): at 12:25

## 2018-03-16 RX ADMIN — SODIUM CHLORIDE 3 MILLILITER(S): 9 INJECTION INTRAMUSCULAR; INTRAVENOUS; SUBCUTANEOUS at 10:48

## 2018-03-16 RX ADMIN — Medication 4: at 16:59

## 2018-03-16 RX ADMIN — HEPARIN SODIUM 5000 UNIT(S): 5000 INJECTION INTRAVENOUS; SUBCUTANEOUS at 22:01

## 2018-03-16 RX ADMIN — OXYCODONE AND ACETAMINOPHEN 1 TABLET(S): 5; 325 TABLET ORAL at 03:34

## 2018-03-16 RX ADMIN — Medication 25 MILLIGRAM(S): at 22:01

## 2018-03-16 RX ADMIN — HEPARIN SODIUM 5000 UNIT(S): 5000 INJECTION INTRAVENOUS; SUBCUTANEOUS at 12:25

## 2018-03-16 RX ADMIN — Medication 100 MILLIGRAM(S): at 06:18

## 2018-03-16 RX ADMIN — Medication 100 MILLIGRAM(S): at 22:01

## 2018-03-16 RX ADMIN — Medication 40 MILLIGRAM(S): at 06:19

## 2018-03-16 RX ADMIN — OXYCODONE AND ACETAMINOPHEN 1 TABLET(S): 5; 325 TABLET ORAL at 22:09

## 2018-03-16 RX ADMIN — Medication 9 UNIT(S): at 07:58

## 2018-03-16 RX ADMIN — Medication 650 MILLIGRAM(S): at 12:26

## 2018-03-16 RX ADMIN — Medication 100 MILLIGRAM(S): at 12:25

## 2018-03-16 RX ADMIN — SODIUM CHLORIDE 3 MILLILITER(S): 9 INJECTION INTRAMUSCULAR; INTRAVENOUS; SUBCUTANEOUS at 06:37

## 2018-03-16 RX ADMIN — Medication 20 MILLIEQUIVALENT(S): at 12:25

## 2018-03-16 RX ADMIN — Medication 25 MILLIGRAM(S): at 06:18

## 2018-03-16 RX ADMIN — ATORVASTATIN CALCIUM 40 MILLIGRAM(S): 80 TABLET, FILM COATED ORAL at 22:01

## 2018-03-16 RX ADMIN — Medication 650 MILLIGRAM(S): at 19:40

## 2018-03-16 RX ADMIN — CLOPIDOGREL BISULFATE 75 MILLIGRAM(S): 75 TABLET, FILM COATED ORAL at 12:25

## 2018-03-16 RX ADMIN — Medication 9 UNIT(S): at 12:25

## 2018-03-16 RX ADMIN — Medication 650 MILLIGRAM(S): at 13:00

## 2018-03-16 RX ADMIN — Medication 650 MILLIGRAM(S): at 20:10

## 2018-03-16 RX ADMIN — Medication 20 MILLIEQUIVALENT(S): at 12:24

## 2018-03-16 RX ADMIN — INSULIN GLARGINE 24 UNIT(S): 100 INJECTION, SOLUTION SUBCUTANEOUS at 22:01

## 2018-03-16 NOTE — PROGRESS NOTE ADULT - SUBJECTIVE AND OBJECTIVE BOX
VITAL SIGNS    Subjective: Patient States: "Hello"     Telemetry: NSR 80-90     Vital Signs Last 24 Hrs  T(C): 36.8 (03-16-18 @ 13:40), Max: 36.9 (03-16-18 @ 05:00)  T(F): 98.2 (03-16-18 @ 13:40), Max: 98.5 (03-16-18 @ 05:00)  HR: 86 (03-16-18 @ 13:40) (83 - 103)  BP: 130/77 (03-16-18 @ 13:40) (115/72 - 133/78)  RR: 18 (03-16-18 @ 13:40) (18 - 18)  SpO2: 93% (03-16-18 @ 13:40) (90% - 94%)           03-15 @ 07:01  -  03-16 @ 07:00  --------------------------------------------------------  IN: 240 mL / OUT: 300 mL / NET: -60 mL    03-16 @ 07:01  -  03-16 @ 14:24  --------------------------------------------------------  IN: 120 mL / OUT: 0 mL / NET: 120 mL    CAPILLARY BLOOD GLUCOSE    POCT Blood Glucose.: 147 mg/dL (16 Mar 2018 11:51)  POCT Blood Glucose.: 127 mg/dL (16 Mar 2018 07:52)  POCT Blood Glucose.: 131 mg/dL (16 Mar 2018 06:34)  POCT Blood Glucose.: 123 mg/dL (16 Mar 2018 02:02)  POCT Blood Glucose.: 159 mg/dL (15 Mar 2018 21:49)  POCT Blood Glucose.: 161 mg/dL (15 Mar 2018 17:05)  POCT Blood Glucose.: 175 mg/dL (15 Mar 2018 14:35)          PHYSICAL EXAM    Neurology: alert and oriented x 3, nonfocal, no gross deficits    CV: (+) S1 and S2, No murmurs, rubs, gallops or clicks     Sternal Wound:  MSI -->CDI , sternum stable    Lungs: CTA B/L     Abdomen: soft, nontender, nondistended, positive bowel sounds, (+) Flatus; (+) BM     :  Voiding               Extremities:  B/L LE (+) 1 edema; negative calf tenderness; (+) 2 DP palpable        acetaminophen   Tablet. 650 milliGRAM(s) Oral every 6 hours PRN  aspirin enteric coated 81 milliGRAM(s) Oral daily  atorvastatin 40 milliGRAM(s) Oral at bedtime  clopidogrel Tablet 75 milliGRAM(s) Oral daily  docusate sodium 100 milliGRAM(s) Oral three times a day  furosemideTablet 40 milliGRAM(s) Oral daily  heparin  Injectable 5000 Unit(s) SubCutaneous every 8 hours  insulin glargine Injectable (LANTUS) 24 Unit(s) SubCutaneous at bedtime  insulin lispro (HumaLOG) corrective regimen sliding scale   SubCutaneous Before meals and at bedtime  insulin lispro Injectable (HumaLOG) 9 Unit(s) SubCutaneous three times a day before meals  levothyroxine 25 MICROGram(s) Oral daily  metoprolol  tartrate 25 milliGRAM(s) Oral every 8 hours  oxyCODONE 5 mG/acetaminophen 325 mG 1 Tablet(s) Oral every 4 hours PRN  pantoprazole Tablet 40 milliGRAM(s) Oral before breakfast  potassium chloride  Tablet ER 20 milliEquivalent(s) Oral daily    Physical Therapy Rec:   Home  [  ]   Home w/ PT  [ X ]  Rehab  [  ]    Discussed with Cardiothoracic Team at AM rounds.

## 2018-03-16 NOTE — PROGRESS NOTE ADULT - SUBJECTIVE AND OBJECTIVE BOX
Chief complaint  Patient is a 53y old  Female who presents with a chief complaint of I need heart surgery (08 Mar 2018 17:32)   Review of systems  Patient in bed, looks comfortable, no fever, no hypoglycemia.    Labs and Fingersticks  CAPILLARY BLOOD GLUCOSE      POCT Blood Glucose.: 174 mg/dL (16 Mar 2018 16:32)  POCT Blood Glucose.: 147 mg/dL (16 Mar 2018 11:51)  POCT Blood Glucose.: 127 mg/dL (16 Mar 2018 07:52)  POCT Blood Glucose.: 131 mg/dL (16 Mar 2018 06:34)  POCT Blood Glucose.: 123 mg/dL (16 Mar 2018 02:02)  POCT Blood Glucose.: 159 mg/dL (15 Mar 2018 21:49)      Anion Gap, Serum: 11 (03-16 @ 07:01)  Anion Gap, Serum: 13 (03-15 @ 06:02)      Calcium, Total Serum: 8.3 <L> (03-16 @ 07:01)  Calcium, Total Serum: 8.1 <L> (03-15 @ 06:02)          03-16    139  |  103  |  26<H>  ----------------------------<  130<H>  3.9   |  25  |  0.76    Ca    8.3<L>      16 Mar 2018 07:01                          8.9    12.4  )-----------( 228      ( 16 Mar 2018 07:01 )             26.5     Medications  MEDICATIONS  (STANDING):  aspirin enteric coated 81 milliGRAM(s) Oral daily  atorvastatin 40 milliGRAM(s) Oral at bedtime  clopidogrel Tablet 75 milliGRAM(s) Oral daily  dextrose 50% Injectable 50 milliLiter(s) IV Push every 15 minutes  dextrose 50% Injectable 25 milliLiter(s) IV Push every 15 minutes  docusate sodium 100 milliGRAM(s) Oral three times a day  furosemide    Tablet 40 milliGRAM(s) Oral daily  heparin  Injectable 5000 Unit(s) SubCutaneous every 8 hours  insulin glargine Injectable (LANTUS) 24 Unit(s) SubCutaneous at bedtime  insulin lispro (HumaLOG) corrective regimen sliding scale   SubCutaneous Before meals and at bedtime  insulin lispro Injectable (HumaLOG) 9 Unit(s) SubCutaneous three times a day before meals  levothyroxine 25 MICROGram(s) Oral daily  metoprolol     tartrate 25 milliGRAM(s) Oral every 8 hours  pantoprazole    Tablet 40 milliGRAM(s) Oral before breakfast  potassium chloride    Tablet ER 20 milliEquivalent(s) Oral daily  sodium chloride 0.9% lock flush 3 milliLiter(s) IV Push every 8 hours      Physical Exam  General: Patient comfortable in bed  Vital Signs Last 12 Hrs  T(F): 98.2 (03-16-18 @ 13:40), Max: 98.2 (03-16-18 @ 13:40)  HR: 86 (03-16-18 @ 13:40) (86 - 103)  BP: 130/77 (03-16-18 @ 13:40) (130/77 - 133/78)  BP(mean): --  RR: 18 (03-16-18 @ 13:40) (18 - 18)  SpO2: 93% (03-16-18 @ 13:40) (92% - 93%)  Neck: No palpable thyroid nodules.  CVS: S1S2, No murmurs  Respiratory: No wheezing, no crepitations  GI: Abdomen soft, bowel sounds positive  Musculoskeletal:  edema lower extremities.   Skin: No skin rashes, no ecchymosis    Diagnostics

## 2018-03-17 ENCOUNTER — TRANSCRIPTION ENCOUNTER (OUTPATIENT)
Age: 53
End: 2018-03-17

## 2018-03-17 VITALS
TEMPERATURE: 98 F | RESPIRATION RATE: 18 BRPM | OXYGEN SATURATION: 94 % | SYSTOLIC BLOOD PRESSURE: 143 MMHG | DIASTOLIC BLOOD PRESSURE: 73 MMHG | HEART RATE: 99 BPM

## 2018-03-17 LAB
ANION GAP SERPL CALC-SCNC: 14 MMOL/L — SIGNIFICANT CHANGE UP (ref 5–17)
BUN SERPL-MCNC: 16 MG/DL — SIGNIFICANT CHANGE UP (ref 7–23)
CALCIUM SERPL-MCNC: 8.2 MG/DL — LOW (ref 8.4–10.5)
CHLORIDE SERPL-SCNC: 102 MMOL/L — SIGNIFICANT CHANGE UP (ref 96–108)
CO2 SERPL-SCNC: 24 MMOL/L — SIGNIFICANT CHANGE UP (ref 22–31)
CREAT SERPL-MCNC: 0.7 MG/DL — SIGNIFICANT CHANGE UP (ref 0.5–1.3)
GLUCOSE BLDC GLUCOMTR-MCNC: 121 MG/DL — HIGH (ref 70–99)
GLUCOSE BLDC GLUCOMTR-MCNC: 130 MG/DL — HIGH (ref 70–99)
GLUCOSE SERPL-MCNC: 114 MG/DL — HIGH (ref 70–99)
HCT VFR BLD CALC: 25.5 % — LOW (ref 34.5–45)
HGB BLD-MCNC: 8.4 G/DL — LOW (ref 11.5–15.5)
MCHC RBC-ENTMCNC: 30.6 PG — SIGNIFICANT CHANGE UP (ref 27–34)
MCHC RBC-ENTMCNC: 33.1 GM/DL — SIGNIFICANT CHANGE UP (ref 32–36)
MCV RBC AUTO: 92.4 FL — SIGNIFICANT CHANGE UP (ref 80–100)
PLATELET # BLD AUTO: 269 K/UL — SIGNIFICANT CHANGE UP (ref 150–400)
POTASSIUM SERPL-MCNC: 3.8 MMOL/L — SIGNIFICANT CHANGE UP (ref 3.5–5.3)
POTASSIUM SERPL-SCNC: 3.8 MMOL/L — SIGNIFICANT CHANGE UP (ref 3.5–5.3)
RBC # BLD: 2.76 M/UL — LOW (ref 3.8–5.2)
RBC # FLD: 14.3 % — SIGNIFICANT CHANGE UP (ref 10.3–14.5)
SODIUM SERPL-SCNC: 140 MMOL/L — SIGNIFICANT CHANGE UP (ref 135–145)
WBC # BLD: 13.9 K/UL — HIGH (ref 3.8–10.5)
WBC # FLD AUTO: 13.9 K/UL — HIGH (ref 3.8–10.5)

## 2018-03-17 PROCEDURE — 93306 TTE W/DOPPLER COMPLETE: CPT

## 2018-03-17 PROCEDURE — 80053 COMPREHEN METABOLIC PANEL: CPT

## 2018-03-17 PROCEDURE — 82550 ASSAY OF CK (CPK): CPT

## 2018-03-17 PROCEDURE — 85576 BLOOD PLATELET AGGREGATION: CPT

## 2018-03-17 PROCEDURE — 87641 MR-STAPH DNA AMP PROBE: CPT

## 2018-03-17 PROCEDURE — 82803 BLOOD GASES ANY COMBINATION: CPT

## 2018-03-17 PROCEDURE — 86900 BLOOD TYPING SEROLOGIC ABO: CPT

## 2018-03-17 PROCEDURE — P9016: CPT

## 2018-03-17 PROCEDURE — C1889: CPT

## 2018-03-17 PROCEDURE — 80048 BASIC METABOLIC PNL TOTAL CA: CPT

## 2018-03-17 PROCEDURE — 82435 ASSAY OF BLOOD CHLORIDE: CPT

## 2018-03-17 PROCEDURE — 71045 X-RAY EXAM CHEST 1 VIEW: CPT

## 2018-03-17 PROCEDURE — 93880 EXTRACRANIAL BILAT STUDY: CPT

## 2018-03-17 PROCEDURE — 85014 HEMATOCRIT: CPT

## 2018-03-17 PROCEDURE — 83735 ASSAY OF MAGNESIUM: CPT

## 2018-03-17 PROCEDURE — 84443 ASSAY THYROID STIM HORMONE: CPT

## 2018-03-17 PROCEDURE — 94002 VENT MGMT INPAT INIT DAY: CPT

## 2018-03-17 PROCEDURE — 83605 ASSAY OF LACTIC ACID: CPT

## 2018-03-17 PROCEDURE — 93005 ELECTROCARDIOGRAM TRACING: CPT

## 2018-03-17 PROCEDURE — 97116 GAIT TRAINING THERAPY: CPT

## 2018-03-17 PROCEDURE — 86901 BLOOD TYPING SEROLOGIC RH(D): CPT

## 2018-03-17 PROCEDURE — 85610 PROTHROMBIN TIME: CPT

## 2018-03-17 PROCEDURE — 84132 ASSAY OF SERUM POTASSIUM: CPT

## 2018-03-17 PROCEDURE — 85730 THROMBOPLASTIN TIME PARTIAL: CPT

## 2018-03-17 PROCEDURE — 82947 ASSAY GLUCOSE BLOOD QUANT: CPT

## 2018-03-17 PROCEDURE — 99239 HOSP IP/OBS DSCHRG MGMT >30: CPT | Mod: 24

## 2018-03-17 PROCEDURE — 84439 ASSAY OF FREE THYROXINE: CPT

## 2018-03-17 PROCEDURE — 36430 TRANSFUSION BLD/BLD COMPNT: CPT

## 2018-03-17 PROCEDURE — 84484 ASSAY OF TROPONIN QUANT: CPT

## 2018-03-17 PROCEDURE — 87116 MYCOBACTERIA CULTURE: CPT

## 2018-03-17 PROCEDURE — 97162 PT EVAL MOD COMPLEX 30 MIN: CPT

## 2018-03-17 PROCEDURE — C1769: CPT

## 2018-03-17 PROCEDURE — 86850 RBC ANTIBODY SCREEN: CPT

## 2018-03-17 PROCEDURE — 87206 SMEAR FLUORESCENT/ACID STAI: CPT

## 2018-03-17 PROCEDURE — 87640 STAPH A DNA AMP PROBE: CPT

## 2018-03-17 PROCEDURE — 86891 AUTOLOGOUS BLOOD OP SALVAGE: CPT

## 2018-03-17 PROCEDURE — 86923 COMPATIBILITY TEST ELECTRIC: CPT

## 2018-03-17 PROCEDURE — 87102 FUNGUS ISOLATION CULTURE: CPT

## 2018-03-17 PROCEDURE — C1729: CPT

## 2018-03-17 PROCEDURE — 82565 ASSAY OF CREATININE: CPT

## 2018-03-17 PROCEDURE — C1751: CPT

## 2018-03-17 PROCEDURE — 85384 FIBRINOGEN ACTIVITY: CPT

## 2018-03-17 PROCEDURE — 81001 URINALYSIS AUTO W/SCOPE: CPT

## 2018-03-17 PROCEDURE — 84295 ASSAY OF SERUM SODIUM: CPT

## 2018-03-17 PROCEDURE — P9047: CPT

## 2018-03-17 PROCEDURE — 82330 ASSAY OF CALCIUM: CPT

## 2018-03-17 PROCEDURE — P9045: CPT

## 2018-03-17 PROCEDURE — 82962 GLUCOSE BLOOD TEST: CPT

## 2018-03-17 PROCEDURE — 83880 ASSAY OF NATRIURETIC PEPTIDE: CPT

## 2018-03-17 PROCEDURE — 94010 BREATHING CAPACITY TEST: CPT

## 2018-03-17 PROCEDURE — 87015 SPECIMEN INFECT AGNT CONCNTJ: CPT

## 2018-03-17 PROCEDURE — 85027 COMPLETE CBC AUTOMATED: CPT

## 2018-03-17 PROCEDURE — 82553 CREATINE MB FRACTION: CPT

## 2018-03-17 PROCEDURE — 84100 ASSAY OF PHOSPHORUS: CPT

## 2018-03-17 PROCEDURE — 87086 URINE CULTURE/COLONY COUNT: CPT

## 2018-03-17 RX ORDER — METOPROLOL TARTRATE 50 MG
50 TABLET ORAL
Qty: 0 | Refills: 0 | Status: DISCONTINUED | OUTPATIENT
Start: 2018-03-17 | End: 2018-03-17

## 2018-03-17 RX ORDER — METOPROLOL TARTRATE 50 MG
25 TABLET ORAL ONCE
Qty: 0 | Refills: 0 | Status: COMPLETED | OUTPATIENT
Start: 2018-03-17 | End: 2018-03-17

## 2018-03-17 RX ORDER — DOCUSATE SODIUM 100 MG
1 CAPSULE ORAL
Qty: 90 | Refills: 0
Start: 2018-03-17 | End: 2018-04-15

## 2018-03-17 RX ORDER — AMLODIPINE BESYLATE 2.5 MG/1
1 TABLET ORAL
Qty: 0 | Refills: 0 | COMMUNITY

## 2018-03-17 RX ORDER — ENOXAPARIN SODIUM 100 MG/ML
24 INJECTION SUBCUTANEOUS
Qty: 3 | Refills: 0
Start: 2018-03-17 | End: 2018-04-15

## 2018-03-17 RX ORDER — ACETAMINOPHEN 500 MG
2 TABLET ORAL
Qty: 112 | Refills: 0 | OUTPATIENT
Start: 2018-03-17 | End: 2018-03-30

## 2018-03-17 RX ORDER — FUROSEMIDE 40 MG
1 TABLET ORAL
Qty: 7 | Refills: 0
Start: 2018-03-17 | End: 2018-03-23

## 2018-03-17 RX ORDER — INSULIN LISPRO 100/ML
9 VIAL (ML) SUBCUTANEOUS
Qty: 3 | Refills: 0
Start: 2018-03-17 | End: 2018-04-15

## 2018-03-17 RX ORDER — ASPIRIN/CALCIUM CARB/MAGNESIUM 324 MG
1 TABLET ORAL
Qty: 30 | Refills: 0
Start: 2018-03-17 | End: 2018-04-15

## 2018-03-17 RX ORDER — ATORVASTATIN CALCIUM 80 MG/1
1 TABLET, FILM COATED ORAL
Qty: 30 | Refills: 0
Start: 2018-03-17 | End: 2018-04-15

## 2018-03-17 RX ORDER — LEVOTHYROXINE SODIUM 125 MCG
1 TABLET ORAL
Qty: 30 | Refills: 0
Start: 2018-03-17 | End: 2018-04-15

## 2018-03-17 RX ORDER — CLOPIDOGREL BISULFATE 75 MG/1
1 TABLET, FILM COATED ORAL
Qty: 30 | Refills: 0
Start: 2018-03-17 | End: 2018-04-15

## 2018-03-17 RX ORDER — METOPROLOL TARTRATE 50 MG
1 TABLET ORAL
Qty: 60 | Refills: 0
Start: 2018-03-17 | End: 2018-04-15

## 2018-03-17 RX ORDER — POTASSIUM CHLORIDE 20 MEQ
1 PACKET (EA) ORAL
Qty: 7 | Refills: 0
Start: 2018-03-17 | End: 2018-03-23

## 2018-03-17 RX ORDER — ACETAMINOPHEN 500 MG
2 TABLET ORAL
Qty: 112 | Refills: 0
Start: 2018-03-17 | End: 2018-03-30

## 2018-03-17 RX ORDER — LEVOTHYROXINE SODIUM 125 MCG
1 TABLET ORAL
Qty: 0 | Refills: 0 | COMMUNITY

## 2018-03-17 RX ORDER — PANTOPRAZOLE SODIUM 20 MG/1
1 TABLET, DELAYED RELEASE ORAL
Qty: 30 | Refills: 0
Start: 2018-03-17 | End: 2018-04-15

## 2018-03-17 RX ADMIN — PANTOPRAZOLE SODIUM 40 MILLIGRAM(S): 20 TABLET, DELAYED RELEASE ORAL at 05:02

## 2018-03-17 RX ADMIN — Medication 25 MILLIGRAM(S): at 05:02

## 2018-03-17 RX ADMIN — SODIUM CHLORIDE 3 MILLILITER(S): 9 INJECTION INTRAMUSCULAR; INTRAVENOUS; SUBCUTANEOUS at 05:00

## 2018-03-17 RX ADMIN — SODIUM CHLORIDE 3 MILLILITER(S): 9 INJECTION INTRAMUSCULAR; INTRAVENOUS; SUBCUTANEOUS at 14:20

## 2018-03-17 RX ADMIN — Medication 20 MILLIEQUIVALENT(S): at 12:06

## 2018-03-17 RX ADMIN — Medication 9 UNIT(S): at 08:25

## 2018-03-17 RX ADMIN — Medication 100 MILLIGRAM(S): at 05:02

## 2018-03-17 RX ADMIN — Medication 650 MILLIGRAM(S): at 12:39

## 2018-03-17 RX ADMIN — CLOPIDOGREL BISULFATE 75 MILLIGRAM(S): 75 TABLET, FILM COATED ORAL at 12:06

## 2018-03-17 RX ADMIN — Medication 650 MILLIGRAM(S): at 12:09

## 2018-03-17 RX ADMIN — Medication 25 MICROGRAM(S): at 05:02

## 2018-03-17 RX ADMIN — Medication 25 MILLIGRAM(S): at 10:16

## 2018-03-17 RX ADMIN — Medication 9 UNIT(S): at 12:05

## 2018-03-17 RX ADMIN — HEPARIN SODIUM 5000 UNIT(S): 5000 INJECTION INTRAVENOUS; SUBCUTANEOUS at 05:01

## 2018-03-17 RX ADMIN — Medication 40 MILLIGRAM(S): at 05:02

## 2018-03-17 RX ADMIN — Medication 650 MILLIGRAM(S): at 05:08

## 2018-03-17 RX ADMIN — Medication 650 MILLIGRAM(S): at 05:38

## 2018-03-17 RX ADMIN — Medication 81 MILLIGRAM(S): at 12:06

## 2018-03-17 NOTE — PROGRESS NOTE ADULT - PROVIDER SPECIALTY LIST ADULT
CT Surgery
CTU
Critical Care
Endocrinology

## 2018-03-17 NOTE — PROGRESS NOTE ADULT - PROBLEM SELECTOR PLAN 3
Monitored and followed up by primary/cardiology team
Monitored and followed up by CT icu/cardiology team
Monitored and followed up by primary/cardiology team

## 2018-03-17 NOTE — DISCHARGE NOTE ADULT - CARE PLAN
Principal Discharge DX:	S/P CABG x 4  Goal:	Recovery  Assessment and plan of treatment:	1. Daily Shower  2. Weight yourself daily and notify any weight gain greater than 2-3 pounds in 24 hours.  3. Regular diet - low fat, low cholesterol, no added salt.  4. Cleanse Midsternal incision and leg incision daily while showering with warm water and mild soap, pat dry and maintain open to air.   5. Follow Cardiac Surgery Do's and Don'ts discharge instructions.   6. No driving until cleared by MD.   7. No heavy lifting nothing greater than 5 pounds until cleared by MD.   8. Call / Notify MD any fever greater than 101.0  9. Increase Activity as tolerated.

## 2018-03-17 NOTE — PROGRESS NOTE ADULT - SUBJECTIVE AND OBJECTIVE BOX
Chief complaint  Patient is a 53y old  Female who presents with a chief complaint of 3/11/18 s/p CABG X 4 LIMA /EVH (17 Mar 2018 13:36)   Review of systems  Patient in bed, looks comfortable, no fever, no hypoglycemia.    Labs and Fingersticks  CAPILLARY BLOOD GLUCOSE      POCT Blood Glucose.: 130 mg/dL (17 Mar 2018 11:52)  POCT Blood Glucose.: 121 mg/dL (17 Mar 2018 07:54)  POCT Blood Glucose.: 145 mg/dL (16 Mar 2018 21:45)  POCT Blood Glucose.: 174 mg/dL (16 Mar 2018 16:32)      Anion Gap, Serum: 14 (03-17 @ 06:52)  Anion Gap, Serum: 11 (03-16 @ 07:01)      Calcium, Total Serum: 8.2 <L> (03-17 @ 06:52)  Calcium, Total Serum: 8.3 <L> (03-16 @ 07:01)          03-17    140  |  102  |  16  ----------------------------<  114<H>  3.8   |  24  |  0.70    Ca    8.2<L>      17 Mar 2018 06:52                          8.4    13.9  )-----------( 269      ( 17 Mar 2018 06:52 )             25.5     Medications  MEDICATIONS  (STANDING):  aspirin enteric coated 81 milliGRAM(s) Oral daily  atorvastatin 40 milliGRAM(s) Oral at bedtime  clopidogrel Tablet 75 milliGRAM(s) Oral daily  dextrose 50% Injectable 50 milliLiter(s) IV Push every 15 minutes  dextrose 50% Injectable 25 milliLiter(s) IV Push every 15 minutes  docusate sodium 100 milliGRAM(s) Oral three times a day  furosemide    Tablet 40 milliGRAM(s) Oral daily  heparin  Injectable 5000 Unit(s) SubCutaneous every 8 hours  insulin glargine Injectable (LANTUS) 24 Unit(s) SubCutaneous at bedtime  insulin lispro (HumaLOG) corrective regimen sliding scale   SubCutaneous Before meals and at bedtime  insulin lispro Injectable (HumaLOG) 9 Unit(s) SubCutaneous three times a day before meals  levothyroxine 25 MICROGram(s) Oral daily  metoprolol     tartrate 50 milliGRAM(s) Oral two times a day  pantoprazole    Tablet 40 milliGRAM(s) Oral before breakfast  potassium chloride    Tablet ER 20 milliEquivalent(s) Oral daily  sodium chloride 0.9% lock flush 3 milliLiter(s) IV Push every 8 hours      Physical Exam  General: Patient comfortable in bed  Vital Signs Last 12 Hrs  T(F): 98 (03-17-18 @ 05:00), Max: 98 (03-17-18 @ 05:00)  HR: 89 (03-17-18 @ 05:00) (89 - 89)  BP: 149/89 (03-17-18 @ 05:00) (149/89 - 149/89)  BP(mean): --  RR: 18 (03-17-18 @ 05:00) (18 - 18)  SpO2: 93% (03-17-18 @ 05:00) (93% - 93%)  Neck: No palpable thyroid nodules.  CVS: S1S2, No murmurs  Respiratory: No wheezing, no crepitations  GI: Abdomen soft, bowel sounds positive  Musculoskeletal:  edema lower extremities.   Skin: No skin rashes, no ecchymosis    Diagnostics

## 2018-03-17 NOTE — DISCHARGE NOTE ADULT - CARE PROVIDERS DIRECT ADDRESSES
[email or web address]

## 2018-03-17 NOTE — PROGRESS NOTE ADULT - PROBLEM SELECTOR PROBLEM 2
Hypothyroid
Hypothyroid
DM (diabetes mellitus)
Hypothyroid

## 2018-03-17 NOTE — DISCHARGE NOTE ADULT - NS AS ACTIVITY OBS
Sex allowed/Do not make important decisions/Do not drive or operate machinery/Walking-Outdoors allowed/Stairs allowed/Showering allowed/Walking-Indoors allowed/No Heavy lifting/straining

## 2018-03-17 NOTE — DISCHARGE NOTE ADULT - CARE PROVIDER_API CALL
Lu Stone), Thoracic and Cardiac Surgery  [address] [place] [address]  Phone: [phone]  Fax: [phone]    Vince Roberto), EndocrinologyMetabDiabetes; Internal Medicine  [address] [place] [address]  Phone: [phone]  Fax: [phone]    Inder Galeano), Cardiovascular Disease  [address] [place] [address]  Phone: [phone]  Fax: [phone]

## 2018-03-17 NOTE — DISCHARGE NOTE ADULT - MEDICATION SUMMARY - MEDICATIONS TO TAKE
I will START or STAY ON the medications listed below when I get home from the hospital:    BD ultra thin pen needles  -- 1 application subcutaneous 4 times a day before meals and at bedtime   ICD 10 E11.9  -- Indication: For Diabetes     One touch Glucometer Kit   -- 1 kit Accucheks 4 times a day before neals and at bedtime   -- Indication: For Diabetes     One touch Lancets   -- 1 application  4 times a day before meals and at bedtime   -- Indication: For Diabetes     acetaminophen 325 mg oral tablet  -- 2 tab(s) by mouth every 6 hours, As needed, Mild Pain (1 - 3)  -- Indication: For Pain     oxyCODONE-acetaminophen 5 mg-325 mg oral tablet  -- 1 tab(s) by mouth every 4 hours, As needed, Moderate Pain (4 - 6) MDD:6 tabs  -- Indication: For Pain     aspirin 81 mg oral delayed release tablet  -- 1 tab(s) by mouth once a day  -- Indication: For Blood thinner     Lantus Solostar Pen 100 units/mL subcutaneous solution  -- 24 unit(s) subcutaneous once a day (at bedtime)   -- Do not drink alcoholic beverages when taking this medication.  It is very important that you take or use this exactly as directed.  Do not skip doses or discontinue unless directed by your doctor.  Keep in refrigerator.  Do not freeze.    -- Indication: For Diabetes     HumaLOG KwikPen 100 units/mL injectable solution  -- 9 unit(s) injectable 3 times a day (before meals)   -- Indication: For Diabetes     atorvastatin 40 mg oral tablet  -- 1 tab(s) by mouth once a day (at bedtime)  -- Indication: For Cholesterol     clopidogrel 75 mg oral tablet  -- 1 tab(s) by mouth once a day  -- Indication: For Blood thinner     metoprolol tartrate 50 mg oral tablet  -- 1 tab(s) by mouth 2 times a day  -- Indication: For Blood pessure and heart rate control     furosemide 40 mg oral tablet  -- 1 tab(s) by mouth once a day  -- Indication: For Diuretic     docusate sodium 100 mg oral capsule  -- 1 cap(s) by mouth 3 times a day, As Needed -for constipation   -- Indication: For Stool Softner     potassium chloride 20 mEq oral tablet, extended release  -- 1 tab(s) by mouth once a day  -- Indication: For Potassium Supplement while on lasix     pantoprazole 40 mg oral delayed release tablet  -- 1 tab(s) by mouth once a day (before a meal)  -- Indication: For Acid Reflux     levothyroxine 25 mcg (0.025 mg) oral tablet  -- 1 tab(s) by mouth once a day  -- Indication: For Hypothyroid

## 2018-03-17 NOTE — PROGRESS NOTE ADULT - PROBLEM SELECTOR PLAN 1
Will increase Lantus to 20 units at bed time.  Will increase Humalog to 7 units before each meal in addition to Humalog correction scale coverage.  Patient counseled for compliance with consistent low carb diet.
Will increase Lantus to 24 units at bed time.  Will increase Humalog to 9 units before each meal in addition to Humalog correction scale coverage.  Patient agrees to be DC home on insulin, has done injections before, will DC home on current insulin regimen, FU 1 week.  Patient counseled for compliance with consistent low carb diet.
Continue with ASA / Plavix   Continue with Lopressor 25 mg PO TID   Continue with statin   Increase activity as tolerated   Shower on POD #5   Continue with diuresis   Monitor renal function   Glycemic control as per Endo   Encourage Chest PT and incentive spirometry every 1 hours x 10 while awake   Continue with PUD and DVT prophylaxis   D/C plan home PT once medically cleared   Plan of care discussed with attending
Continue with ASA / Plavix   Continue with Lopressor 25 mg PO TID   Continue with statin   Increase activity as tolerated   Shower on POD #5   D/C pacing wires   Wean off 02 NC --> Maintain o2 sat >92  Chest xray in am  Continue with diuresis   Monitor renal function   Glycemic control as per Endo   Encourage Chest PT and incentive spirometry every 1 hours x 10 while awake   Continue with PUD and DVT prophylaxis   D/C plan home PT once medically cleared   Plan of care discussed with attending
Continue with ASA / Plavix   Continue with Lopressor 25 mg PO TID   Continue with statin   Increase activity as tolerated   Shower on POD #5   Maintain pacing wires --> Isolated   Wean off 02 NC --> Maintain o2 sat >92  Chest xray in am  Continue with diuresis   Monitor renal function   Glycemic control as per Endo   Encourage Chest PT and incentive spirometry every 1 hours x 10 while awake   Continue with PUD and DVT prophylaxis   D/C plan home PT once medically cleared   Plan of care discussed with attending
TELE  ASA LOP ATOR  maintain RT LWS  Maintain pacing wires  Maintain o2 sat >92  Chest xray in am
Will continue current insulin regimen for now. Will continue monitoring FS, log, will Follow up.  DC home on current insulin regimen, FU 1 w  Patient counseled for compliance with consistent low carb diet.
Will continue current insulin regimen for now. Will continue monitoring FS, log, will Follow up.  DC home on current insulin regimen, FU 1 w  Patient counseled for compliance with consistent low carb diet.
Will continue current insulin regimen for now. Will continue monitoring FS, log, will Follow up.  Patient counseled for compliance with consistent low carb diet.
heparin gtt  tele  carotids   echo  labs  Hibiclens shower  NPO at midnight   OR in am
Will continue current insulin regimen for now. Will continue monitoring FS, log, will Follow up.  Patient counseled for compliance with consistent low carb diet.
Will continue current insulin regimen for now. Will continue monitoring FS, log, will Follow up.  Patient counseled for compliance with consistent low carb diet.

## 2018-03-17 NOTE — PROGRESS NOTE ADULT - PROBLEM SELECTOR PROBLEM 1
DM (diabetes mellitus)
DM (diabetes mellitus)
Coronary artery disease involving native coronary artery of native heart with unstable angina pectoris
DM (diabetes mellitus)
S/P CABG x 4
DM (diabetes mellitus)
DM (diabetes mellitus)

## 2018-03-17 NOTE — PROGRESS NOTE ADULT - ASSESSMENT
Assessment  DMT2: 53y Female with DM T2 with hyperglycemia on IV insulin drip, Dced now, eating meals,  FS inacceptable range, no hypoglycemic episode.  CAD: Planing surgery, On medications, stable, monitored.  HTN: Controlled, On med.  Hypothyroidism: On synthroid 25mcg , TSH mildly elevated
Assessment  DMT2: 53y Female with DM T2 with hyperglycemia, switched to basal bolus insulin, eating meals,  FS not at target, no hypoglycemic episode.  CAD: Planing surgery, On medications, stable, monitored.  HTN: Controlled, On med.  Hypothyroidism: On synthroid 25mcg , TSH mildly elevated
53 y.o. female with PMH HTN, hypothyroidism and DM2 presented to [facility] 3/8/18  with c/o  with chest pain and shortness of breath for the past few days.  Ruled in NSTEMI Underwent cardiac cath via right radial at [facility] found  to have Left main and 3 vessel CAD with Mildly impaired LV FX. Plavix loaded. Transferred to [facility]  for CABG in am.   On s/p 03/11/18  CABG x 4 with LIMA   Post op course:  insulin gtt for hyperglycemia --> now weaned off Endo following   Inotropic and pressor support required on Dobutamine / Vasopressin and Levophed gtt --> now weaned off   Hypotension / Hypovolemia  --> Albumin for fluid resuscitation   Acute blood loss anemia 2/2 blood loss post op --> Transfused with PRBC x 2   Hypoxemia requiring  hi freya oxygen --> Wean off to 02 NC   Patient stabilized IV Lasix for oliguria. Left pleural ct d/c CXR- new left PTX   3/13 Patient transferred to [address] [place] [address] on 5 L NC; Right pleural chest tube in place LWS   3/14 Start Lasix 40 mg PO; VVS; D/C Right pleural CT --> F/U CXR finding with (+) small PTX.  OOB --> Chair; Increase activity / Ambulation; PW Isolated    3/15 Wean off 02 NC --> RA; D/C PW; Increase ambulation   Disposition: Home PT in AM
53 y.o. female with PMH HTN, hypothyroidism and DM2 presented to [facility] 3/8/18  with c/o  with chest pain and shortness of breath for the past few days.  Ruled in NSTEMI Underwent cardiac cath via right radial at [facility] found  to have Left main and 3 vessel CAD with Mildly impaired LV FX. Plavix loaded. Transferred to [facility]  for CABG in am.   On s/p 03/11/18  CABG x 4 with LIMA   Post op course:  insulin gtt for hyperglycemia --> now weaned off Endo following   Inotropic and pressor support required on Dobutamine / Vasopressin and Levophed gtt --> now weaned off   Hypotension / Hypovolemia  --> Albumin for fluid resuscitation   Acute blood loss anemia 2/2 blood loss post op --> Transfused with PRBC x 2   Hypoxemia requiring  hi freya oxygen --> Wean off to 02 NC   Patient stabilized IV Lasix for oliguria. Left pleural ct d/c CXR- new left PTX   3/13 Patient transferred to [address] [place] [address] on 5 L NC; Right pleural chest tube in place LWS   3/14 Start Lasix 40 mg PO; VVS; D/C Right pleural CT --> F/U CXR finding with (+) small PTX.  OOB --> Chair; Increase activity / Ambulation; PW Isolated
53 y.o. female with PMH HTN, hypothyroidism and DM2 presented to [facility] 3/8/18  with c/o  with chest pain and shortness of breath for the past few days.  Ruled in NSTEMI Underwent cardiac cath via right radial at [facility] found  to have Left main and 3 vessel CAD with Mildly impaired LV FX. Plavix loaded. Transferred to [facility]  for CABG in am.   On s/p 03/11/18  CABG x 4 with LIMA   Post op course:  insulin gtt for hyperglycemia --> now weaned off Endo following   Inotropic and pressor support required on Dobutamine / Vasopressin and Levophed gtt --> now weaned off   Hypotension / Hypovolemia  --> Albumin for fluid resuscitation   Acute blood loss anemia 2/2 blood loss post op --> Transfused with PRBC x 2   Hypoxemia requiring  hi freya oxygen --> Wean off to 02 NC   Patient stabilized IV Lasix for oliguria. Left pleural ct d/c CXR- new left PTX   3/13 Patient transferred to [address] [place] [address] on 5 L NC; Right pleural chest tube in place LWS   3/14 Start Lasix 40 mg PO; VVS; D/C Right pleural CT --> F/U CXR finding with (+) small PTX.  OOB --> Chair; Increase activity / Ambulation; PW Isolated    3/15 Wean off 02 NC --> RA; D/C PW; Increase ambulation   3/16 Increase ambulation; D/C Home in AM   Disposition: Home PT Saturday 3/17
53 yr old female with H/O HTN, CAD, DM2, acquired hypothyroidism, admitted with chest pain cardiac cath 3V CAD, with preserved EF,                Cont ICU EKG, RR, BP monitoring, BB, asa, statins, AC target PTT 70-80,            glycemic control, insulin gtt, synthroid, protonix          NPO tonight, for CABG in am.     I have spent 30 minutes providing critical care management to this patient.
Assessment  DMT2: 53y Female with DM T2 with hyperglycemia on IV insulin drip, FS inacceptable range, no hypoglycemic episode.  CAD: Planing surgery, On medications, stable, monitored.  HTN: Controlled, On med.  Hypothyroidism: On synthroid 25mcg , TSH mildly elevated
Assessment  DMT2: 53y Female with DM T2 with hyperglycemia on IV insulin drip, FS inacceptable range, no hypoglycemic episode.  CAD: Planing surgery, On medications, stable, monitored.  HTN: Controlled, On med.  Hypothyroidism: On synthroid 25mcg , TSH mildly elevated
Assessment  DMT2: 53y Female with DM T2 with hyperglycemia on IV insulin, in acceptable range, no hypoglycemic episode.  CAD: Planing surgery, On medications, stable, monitored.  HTN: Controlled, On med.  Hypothyroidism: On synthroid 25mcg daily.
Assessment  DMT2: 53y Female with DM T2 with hyperglycemia, switched to basal bolus insulin, eating meals,  FS improving, no hypoglycemic episode.  CAD: Planing surgery, On medications, stable, monitored.  HTN: Controlled, On med.  Hypothyroidism: On synthroid 25mcg , TSH mildly elevated
Assessment  DMT2: 53y Female with DM T2 with hyperglycemia, switched to basal bolus insulin, eating meals,  FS improving, no hypoglycemic episode.  CAD: Planing surgery, On medications, stable, monitored.  HTN: Controlled, On med.  Hypothyroidism: On synthroid 25mcg , TSH mildly elevated
This is a a53 y.o. female with PMH HTN, hypothyroidism and DM2 presented to [facility] 3/8/18  with c/o  with chest pain and shortness of breath for the past few days.    Ruled in NSTEMI Underwent cardiac cath via right radial at [facility] found  to have Left main and 3 vessel CAD with Mildly impaired LV FX. Plavix loaded. Transferred to  [facility]  for CABG in am.   Underwent CABG (coronary artery bypass graft)  03/11/2018  CABG x 4 with LIMA post op course includes insulin gtt for hyperglycemia Dobutamine  Vasopressin and Levophed gtt Albumin for fluid resuscitation  PRBC x2 patient extubated to hi freya oxygen.  Patient stabilized IV Lasix for oliguria . left ct d/c CXR-  new left PTX   3/13 Patient transferred to   [address] [place] [address] right chest tube in place LWS
This is a a53 y.o. female with PMH HTN, hypothyroidism and DM2 presented to [facility] 3/8/18  with c/o  with chest pain and shortness of breath for the past few days. Pt reports sudden CP, substernal, moderate non radiating that is associated with shortness of breath.  Reports unstable angina . Denies fever, chills, n/v, abd pain, diarrhea or dysuria. Denies hx of cardiac issues. Pt is noncompliant with her medications. Denies stressors recently.     Ruled in NSTEMI Underwent cardiac cath via right radial at [facility] found  to have Left main and 3 vessel CAD with Mildly impaired LV FX. Plavix loaded.   Transferred to  [facility]  for CABG in am.
53 yrs old female PMH HTN, hypothyroidism & DM  S/p C4L extubated .    Gi- protonix    CAD- cont ASA, lopressor, lipitor    DVt prophiolaxis with SQ Heparin.    Diuresis as needed    Ambulate as tollered
Assessment  DMT2: 53y Female with DM T2 with hyperglycemia on IV insulin, FS improving, no hypoglycemic episode.  CAD: Planing surgery, On medications, stable, monitored.  HTN: Controlled, On med.  Hypothyroidism: On synthroid 25mcg daily.
Assessment  DMT2: 53y Female with DM T2 with hyperglycemia, switched to basal bolus insulin, eating meals,  FS inacceptable range, no hypoglycemic episode.  CAD: Planing surgery, On medications, stable, monitored.  HTN: Controlled, On med.  Hypothyroidism: On synthroid 25mcg , TSH mildly elevated

## 2018-03-17 NOTE — DISCHARGE NOTE ADULT - HOSPITAL COURSE
53 y.o. female with PMH HTN, hypothyroidism and DM2 presented to [facility] 3/8/18  with c/o  with chest pain and shortness of breath for the past few days.  Ruled in NSTEMI Underwent cardiac cath via right radial at [facility] found  to have Left main and 3 vessel CAD with Mildly impaired LV FX. Plavix loaded. Transferred to [facility]  for CABG in am.   On s/p 03/11/18  CABG x 4 with LIMA   Post op course:  insulin gtt for hyperglycemia --> now weaned off Endo following   Inotropic and pressor support required on Dobutamine / Vasopressin and Levophed gtt --> now weaned off   Hypotension / Hypovolemia  --> Albumin for fluid resuscitation   Acute blood loss anemia 2/2 blood loss post op --> Transfused with PRBC x 2   Hypoxemia requiring  hi freya oxygen --> Wean off to 02 NC   Patient stabilized IV Lasix for oliguria. Left pleural ct d/c CXR- new left PTX   3/13 Patient transferred to [address] [place] [address] on 5 L NC; Right pleural chest tube in place LWS   3/14 Start Lasix 40 mg PO; VVS; D/C Right pleural CT --> F/U CXR finding with (+) small PTX.  OOB --> Chair; Increase activity / Ambulation; PW Isolated    3/15 Wean off 02 NC --> RA; D/C PW; Increase ambulation   3/16 Increase ambulation; D/C Home in AM   3/17 ; BB increased to Lopressor 50 mg PO BID; tolerated well. Discharged Home PT

## 2018-03-17 NOTE — PROGRESS NOTE ADULT - PROBLEM SELECTOR PLAN 2
Continue current synthroid dose, will check TFTs, FU
Endo following continue with current medication regimen   Continue with RISS AC/ HS  Continue with Diabetic / ADA diet
Endo following continue with current medication regimen  Patient will be discharged home on Insulin; Insulin teaching    Continue with RISS AC/ HS  Continue with Diabetic / ADA diet
Endo following continue with current medication regimen  Patient will be discharged home on Insulin; Insulin teaching    Continue with RISS AC/ HS  Continue with Diabetic / ADA diet
Continue current synthroid dose, will check TFTs, FU

## 2018-03-17 NOTE — PROGRESS NOTE ADULT - PROBLEM SELECTOR PROBLEM 3
Coronary artery disease involving native coronary artery of native heart with unstable angina pectoris

## 2018-03-17 NOTE — DISCHARGE NOTE ADULT - PATIENT PORTAL LINK FT
You can access the [facility] Patient Portal, offered by [facility], by registering with the following website: [email or web address]

## 2018-03-17 NOTE — DISCHARGE NOTE ADULT - OTHER SIGNIFICANT FINDINGS
General: WN/WD NAD  Neurology: A&Ox3, nonfocal, MAYORGA x 4  Head:  Normocephalic, atraumatic  ENT:  Mucosa moist, no ulcerations  Neck:  Supple, no sinuses or palpable masses  Lymphatic:  No palpable cervical, supraclavicular, axillary or inguinal adenopathy  Respiratory: CTA B/L  CV: RRR, S1S2, no murmur  Abdominal: Soft, NT, ND no palpable mass  MSK: (+) 1 pitting edema, + peripheral pulses, FROM all 4 extremity  Incisions: intact, no erythema or drainage General: WN/WD NAD  Neurology: A&Ox3, nonfocal, MAYORGA x 4  Head:  Normocephalic, atraumatic  ENT:  Mucosa moist, no ulcerations  Neck:  Supple, no sinuses or palpable masses  Lymphatic:  No palpable cervical, supraclavicular, axillary or inguinal adenopathy  Respiratory: CTA B/L  CV: RRR, S1S2, no murmur  Abdominal: Soft, NT, ND no palpable mass  MSK: (+) 1 pitting edema, + peripheral pulses, FROM all 4 extremity  Incisions: intact, no erythema or drainage  Spent more than 30 min with patient.

## 2018-03-17 NOTE — DISCHARGE NOTE ADULT - ADDITIONAL INSTRUCTIONS
1. Please schedule an appointment with Dr. Stone on Friday 3/23/18 for your post op follow up appointment, please call the [facility] office to schedule an appointment at [phone].   2. Please schedule an appointment with a Cardiologist Dr. Batista in 1-2 weeks, please call the office to schedule an appointment at [phone]  3. Please schedule an appointment with your PCP in 1 week, call the office to schedule an appointment.   4. Please schedule an appointment with Dr. Roberto Endocrinologist in 3-4 weeks, please call the office to schedule an appointment.

## 2018-03-17 NOTE — DISCHARGE NOTE ADULT - MEDICATION SUMMARY - MEDICATIONS TO STOP TAKING
I will STOP taking the medications listed below when I get home from the hospital:    Norvasc 10 mg oral tablet  -- 1 tab(s) by mouth once a day    metoprolol tartrate 25 mg oral tablet  -- 1 tab(s) by mouth 2 times a day    aspirin 81 mg oral delayed release tablet  -- 2 tab(s) by mouth once a day

## 2018-03-23 ENCOUNTER — APPOINTMENT (OUTPATIENT)
Dept: CARDIOTHORACIC SURGERY | Facility: CLINIC | Age: 53
End: 2018-03-23
Payer: MEDICAID

## 2018-03-23 VITALS
SYSTOLIC BLOOD PRESSURE: 153 MMHG | TEMPERATURE: 97.9 F | RESPIRATION RATE: 13 BRPM | DIASTOLIC BLOOD PRESSURE: 90 MMHG | WEIGHT: 158.73 LBS | BODY MASS INDEX: 27.1 KG/M2 | HEIGHT: 64 IN | OXYGEN SATURATION: 96 % | HEART RATE: 87 BPM

## 2018-03-23 DIAGNOSIS — I25.10 ATHEROSCLEROTIC HEART DISEASE OF NATIVE CORONARY ARTERY W/OUT ANGINA PECTORIS: ICD-10-CM

## 2018-03-23 PROCEDURE — 99024 POSTOP FOLLOW-UP VISIT: CPT

## 2018-03-23 RX ORDER — INSULIN GLARGINE 100 [IU]/ML
100 INJECTION, SOLUTION SUBCUTANEOUS
Qty: 3 | Refills: 0 | Status: ACTIVE | COMMUNITY
Start: 2018-03-17

## 2018-03-23 RX ORDER — ASPIRIN ENTERIC COATED TABLETS 81 MG 81 MG/1
81 TABLET, DELAYED RELEASE ORAL
Qty: 30 | Refills: 0 | Status: ACTIVE | COMMUNITY
Start: 2017-09-19

## 2018-03-26 ENCOUNTER — NON-APPOINTMENT (OUTPATIENT)
Age: 53
End: 2018-03-26

## 2018-03-26 ENCOUNTER — APPOINTMENT (OUTPATIENT)
Dept: CARDIOLOGY | Facility: CLINIC | Age: 53
End: 2018-03-26
Payer: MEDICAID

## 2018-03-26 VITALS
SYSTOLIC BLOOD PRESSURE: 148 MMHG | OXYGEN SATURATION: 97 % | DIASTOLIC BLOOD PRESSURE: 86 MMHG | HEART RATE: 88 BPM | HEIGHT: 64 IN | WEIGHT: 154 LBS | BODY MASS INDEX: 26.29 KG/M2

## 2018-03-26 VITALS — SYSTOLIC BLOOD PRESSURE: 134 MMHG | DIASTOLIC BLOOD PRESSURE: 80 MMHG

## 2018-03-26 DIAGNOSIS — E03.9 HYPOTHYROIDISM, UNSPECIFIED: ICD-10-CM

## 2018-03-26 DIAGNOSIS — Z95.1 PRESENCE OF AORTOCORONARY BYPASS GRAFT: ICD-10-CM

## 2018-03-26 DIAGNOSIS — Z82.49 FAMILY HISTORY OF ISCHEMIC HEART DISEASE AND OTHER DISEASES OF THE CIRCULATORY SYSTEM: ICD-10-CM

## 2018-03-26 DIAGNOSIS — I25.10 ATHEROSCLEROTIC HEART DISEASE OF NATIVE CORONARY ARTERY W/OUT ANGINA PECTORIS: ICD-10-CM

## 2018-03-26 DIAGNOSIS — I10 ESSENTIAL (PRIMARY) HYPERTENSION: ICD-10-CM

## 2018-03-26 DIAGNOSIS — E78.2 MIXED HYPERLIPIDEMIA: ICD-10-CM

## 2018-03-26 PROCEDURE — 93000 ELECTROCARDIOGRAM COMPLETE: CPT

## 2018-03-26 PROCEDURE — 99215 OFFICE O/P EST HI 40 MIN: CPT | Mod: 25

## 2018-03-26 RX ORDER — LEVOTHYROXINE SODIUM 0.03 MG/1
25 TABLET ORAL
Qty: 30 | Refills: 0 | Status: ACTIVE | COMMUNITY
Start: 2017-09-19

## 2018-03-27 ENCOUNTER — MEDICATION RENEWAL (OUTPATIENT)
Age: 53
End: 2018-03-27

## 2018-03-28 ENCOUNTER — MEDICATION RENEWAL (OUTPATIENT)
Age: 53
End: 2018-03-28

## 2018-03-28 LAB
CULTURE RESULTS: SIGNIFICANT CHANGE UP
SPECIMEN SOURCE: SIGNIFICANT CHANGE UP

## 2018-03-29 RX ORDER — INSULIN LISPRO 100 [IU]/ML
100 INJECTION, SOLUTION INTRAVENOUS; SUBCUTANEOUS
Qty: 1 | Refills: 0 | Status: ACTIVE | COMMUNITY
Start: 2018-03-17 | End: 1900-01-01

## 2018-04-01 PROCEDURE — G9001: CPT

## 2018-04-08 ENCOUNTER — RX RENEWAL (OUTPATIENT)
Age: 53
End: 2018-04-08

## 2018-04-08 RX ORDER — METOPROLOL TARTRATE 50 MG/1
50 TABLET, FILM COATED ORAL
Qty: 180 | Refills: 1 | Status: ACTIVE | COMMUNITY
Start: 2018-03-17 | End: 1900-01-01

## 2018-04-17 ENCOUNTER — OTHER (OUTPATIENT)
Age: 53
End: 2018-04-17

## 2018-04-23 ENCOUNTER — OUTPATIENT (OUTPATIENT)
Dept: OUTPATIENT SERVICES | Facility: HOSPITAL | Age: 53
LOS: 1 days | Discharge: HOME | End: 2018-04-23

## 2018-04-23 DIAGNOSIS — E11.65 TYPE 2 DIABETES MELLITUS WITH HYPERGLYCEMIA: ICD-10-CM

## 2018-04-23 LAB
ALBUMIN SERPL ELPH-MCNC: 3.5 G/DL — SIGNIFICANT CHANGE UP (ref 3.3–5)
ALP SERPL-CCNC: 81 U/L — SIGNIFICANT CHANGE UP (ref 40–120)
ALT FLD-CCNC: 59 U/L — SIGNIFICANT CHANGE UP (ref 12–78)
ANION GAP SERPL CALC-SCNC: 6 MMOL/L — SIGNIFICANT CHANGE UP (ref 5–17)
AST SERPL-CCNC: 49 U/L — HIGH (ref 15–37)
BASOPHILS # BLD AUTO: 0.04 K/UL — SIGNIFICANT CHANGE UP (ref 0–0.2)
BASOPHILS NFR BLD AUTO: 0.4 % — SIGNIFICANT CHANGE UP (ref 0–2)
BILIRUB SERPL-MCNC: 0.4 MG/DL — SIGNIFICANT CHANGE UP (ref 0.2–1.2)
BUN SERPL-MCNC: 36 MG/DL — HIGH (ref 7–23)
CALCIUM SERPL-MCNC: 9.3 MG/DL — SIGNIFICANT CHANGE UP (ref 8.5–10.1)
CHLORIDE SERPL-SCNC: 104 MMOL/L — SIGNIFICANT CHANGE UP (ref 96–108)
CO2 SERPL-SCNC: 29 MMOL/L — SIGNIFICANT CHANGE UP (ref 22–31)
CREAT SERPL-MCNC: 1.3 MG/DL — SIGNIFICANT CHANGE UP (ref 0.5–1.3)
EOSINOPHIL # BLD AUTO: 0.21 K/UL — SIGNIFICANT CHANGE UP (ref 0–0.5)
EOSINOPHIL NFR BLD AUTO: 2 % — SIGNIFICANT CHANGE UP (ref 0–6)
GLUCOSE SERPL-MCNC: 109 MG/DL — HIGH (ref 70–99)
HCT VFR BLD CALC: 33 % — LOW (ref 34.5–45)
HGB BLD-MCNC: 10.6 G/DL — LOW (ref 11.5–15.5)
IMM GRANULOCYTES NFR BLD AUTO: 0.2 % — SIGNIFICANT CHANGE UP (ref 0–1.5)
LYMPHOCYTES # BLD AUTO: 4.22 K/UL — HIGH (ref 1–3.3)
LYMPHOCYTES # BLD AUTO: 40.1 % — SIGNIFICANT CHANGE UP (ref 13–44)
MCHC RBC-ENTMCNC: 28 PG — SIGNIFICANT CHANGE UP (ref 27–34)
MCHC RBC-ENTMCNC: 32.1 GM/DL — SIGNIFICANT CHANGE UP (ref 32–36)
MCV RBC AUTO: 87.3 FL — SIGNIFICANT CHANGE UP (ref 80–100)
MONOCYTES # BLD AUTO: 0.66 K/UL — SIGNIFICANT CHANGE UP (ref 0–0.9)
MONOCYTES NFR BLD AUTO: 6.3 % — SIGNIFICANT CHANGE UP (ref 2–14)
NEUTROPHILS # BLD AUTO: 5.38 K/UL — SIGNIFICANT CHANGE UP (ref 1.8–7.4)
NEUTROPHILS NFR BLD AUTO: 51 % — SIGNIFICANT CHANGE UP (ref 43–77)
NRBC # BLD: 0 /100 WBCS — SIGNIFICANT CHANGE UP (ref 0–0)
PLATELET # BLD AUTO: 292 K/UL — SIGNIFICANT CHANGE UP (ref 150–400)
POTASSIUM SERPL-MCNC: 4.8 MMOL/L — SIGNIFICANT CHANGE UP (ref 3.5–5.3)
POTASSIUM SERPL-SCNC: 4.8 MMOL/L — SIGNIFICANT CHANGE UP (ref 3.5–5.3)
PROT SERPL-MCNC: 8.6 GM/DL — HIGH (ref 6–8.3)
RBC # BLD: 3.78 M/UL — LOW (ref 3.8–5.2)
RBC # FLD: 14.5 % — SIGNIFICANT CHANGE UP (ref 10.3–14.5)
SODIUM SERPL-SCNC: 139 MMOL/L — SIGNIFICANT CHANGE UP (ref 135–145)
WBC # BLD: 10.53 K/UL — HIGH (ref 3.8–10.5)
WBC # FLD AUTO: 10.53 K/UL — HIGH (ref 3.8–10.5)

## 2018-04-24 LAB
CHOLEST SERPL-MCNC: 117 MG/DL — SIGNIFICANT CHANGE UP (ref 10–199)
HDLC SERPL-MCNC: 26 MG/DL — LOW (ref 40–125)
LIPID PNL WITH DIRECT LDL SERPL: 63 MG/DL — SIGNIFICANT CHANGE UP
TOTAL CHOLESTEROL/HDL RATIO MEASUREMENT: 4.5 RATIO — SIGNIFICANT CHANGE UP (ref 3.3–7.1)
TRIGL SERPL-MCNC: 142 MG/DL — SIGNIFICANT CHANGE UP (ref 10–149)

## 2018-05-02 LAB
CULTURE RESULTS: SIGNIFICANT CHANGE UP
SPECIMEN SOURCE: SIGNIFICANT CHANGE UP

## 2018-05-14 ENCOUNTER — APPOINTMENT (OUTPATIENT)
Dept: CARDIOLOGY | Facility: CLINIC | Age: 53
End: 2018-05-14

## 2018-07-01 ENCOUNTER — OUTPATIENT (OUTPATIENT)
Dept: OUTPATIENT SERVICES | Facility: HOSPITAL | Age: 53
LOS: 1 days | End: 2018-07-01
Payer: MEDICAID

## 2018-07-04 NOTE — PATIENT PROFILE ADULT. - --DESCRIBE SURGICAL SITE
Bedside report given to  Mraiela WESTBROOK.Pt  Resting in the bed.Safety precautions in place and all the lines remain patent.   Right radial s/p cath

## 2018-07-12 DIAGNOSIS — Z71.89 OTHER SPECIFIED COUNSELING: ICD-10-CM

## 2019-03-23 ENCOUNTER — OUTPATIENT (OUTPATIENT)
Dept: OUTPATIENT SERVICES | Facility: HOSPITAL | Age: 54
LOS: 1 days | End: 2019-03-23
Payer: MEDICAID

## 2019-03-23 ENCOUNTER — APPOINTMENT (OUTPATIENT)
Dept: MAMMOGRAPHY | Facility: CLINIC | Age: 54
End: 2019-03-23
Payer: MEDICAID

## 2019-03-23 DIAGNOSIS — Z00.8 ENCOUNTER FOR OTHER GENERAL EXAMINATION: ICD-10-CM

## 2019-03-23 PROCEDURE — 77063 BREAST TOMOSYNTHESIS BI: CPT | Mod: 26

## 2019-03-23 PROCEDURE — 77067 SCR MAMMO BI INCL CAD: CPT | Mod: 26

## 2019-03-23 PROCEDURE — 77067 SCR MAMMO BI INCL CAD: CPT

## 2019-03-23 PROCEDURE — 77063 BREAST TOMOSYNTHESIS BI: CPT

## 2019-04-01 ENCOUNTER — OUTPATIENT (OUTPATIENT)
Dept: OUTPATIENT SERVICES | Facility: HOSPITAL | Age: 54
LOS: 1 days | End: 2019-04-01

## 2019-04-01 ENCOUNTER — APPOINTMENT (OUTPATIENT)
Dept: MAMMOGRAPHY | Facility: CLINIC | Age: 54
End: 2019-04-01
Payer: MEDICAID

## 2019-04-01 ENCOUNTER — APPOINTMENT (OUTPATIENT)
Dept: ULTRASOUND IMAGING | Facility: CLINIC | Age: 54
End: 2019-04-01
Payer: MEDICAID

## 2019-04-01 DIAGNOSIS — Z00.8 ENCOUNTER FOR OTHER GENERAL EXAMINATION: ICD-10-CM

## 2019-04-01 PROCEDURE — 77061 BREAST TOMOSYNTHESIS UNI: CPT | Mod: 26,RT

## 2019-04-01 PROCEDURE — 77065 DX MAMMO INCL CAD UNI: CPT | Mod: 26,RT

## 2019-04-01 PROCEDURE — 77065 DX MAMMO INCL CAD UNI: CPT | Mod: 26

## 2019-04-01 PROCEDURE — 76642 ULTRASOUND BREAST LIMITED: CPT | Mod: 26,RT

## 2019-04-01 PROCEDURE — G0279: CPT | Mod: 26

## 2019-04-01 PROCEDURE — 77065 DX MAMMO INCL CAD UNI: CPT

## 2019-04-01 PROCEDURE — 76642 ULTRASOUND BREAST LIMITED: CPT

## 2019-04-01 PROCEDURE — G0279: CPT

## 2019-07-01 PROCEDURE — G9005: CPT

## 2019-07-11 ENCOUNTER — LABORATORY RESULT (OUTPATIENT)
Age: 54
End: 2019-07-11

## 2019-07-11 ENCOUNTER — APPOINTMENT (OUTPATIENT)
Age: 54
End: 2019-07-11
Payer: MEDICAID

## 2019-07-11 VITALS
DIASTOLIC BLOOD PRESSURE: 72 MMHG | TEMPERATURE: 98.2 F | HEIGHT: 64 IN | HEART RATE: 55 BPM | SYSTOLIC BLOOD PRESSURE: 118 MMHG | BODY MASS INDEX: 26.63 KG/M2 | RESPIRATION RATE: 12 BRPM | WEIGHT: 156 LBS

## 2019-07-11 DIAGNOSIS — Z78.9 OTHER SPECIFIED HEALTH STATUS: ICD-10-CM

## 2019-07-11 DIAGNOSIS — D72.829 ELEVATED WHITE BLOOD CELL COUNT, UNSPECIFIED: ICD-10-CM

## 2019-07-11 LAB
HCT VFR BLD CALC: 39.7 %
HGB BLD-MCNC: 12.8 G/DL
MCHC RBC-ENTMCNC: 30.3 PG
MCHC RBC-ENTMCNC: 32.2 GM/DL
MCV RBC AUTO: 94.1 FL
PLATELET # BLD AUTO: 187 K/UL
RBC # BLD: 4.22 M/UL
RBC # FLD: 11.9 %
WBC # FLD AUTO: 11 K/UL

## 2019-07-11 PROCEDURE — 99205 OFFICE O/P NEW HI 60 MIN: CPT | Mod: 25

## 2019-07-11 PROCEDURE — 36415 COLL VENOUS BLD VENIPUNCTURE: CPT

## 2019-07-11 PROCEDURE — 85025 COMPLETE CBC W/AUTO DIFF WBC: CPT

## 2019-07-11 RX ORDER — DOCUSATE SODIUM 100 MG/1
100 CAPSULE ORAL
Qty: 90 | Refills: 0 | Status: DISCONTINUED | COMMUNITY
Start: 2018-03-17 | End: 2019-07-11

## 2019-07-11 RX ORDER — POTASSIUM CHLORIDE 1500 MG/1
20 TABLET, EXTENDED RELEASE ORAL
Qty: 7 | Refills: 0 | Status: DISCONTINUED | COMMUNITY
Start: 2018-03-17 | End: 2019-07-11

## 2019-07-11 RX ORDER — BLOOD-GLUCOSE METER
W/DEVICE KIT MISCELLANEOUS
Qty: 1 | Refills: 0 | Status: DISCONTINUED | COMMUNITY
Start: 2018-03-17 | End: 2019-07-11

## 2019-07-11 RX ORDER — METFORMIN ER 500 MG 500 MG/1
500 TABLET ORAL
Qty: 60 | Refills: 0 | Status: DISCONTINUED | COMMUNITY
Start: 2017-09-19 | End: 2019-07-11

## 2019-07-11 RX ORDER — ACETAMINOPHEN 325 MG
325 TABLET ORAL
Qty: 112 | Refills: 0 | Status: DISCONTINUED | COMMUNITY
Start: 2018-03-17 | End: 2019-07-11

## 2019-07-11 RX ORDER — OXYCODONE AND ACETAMINOPHEN 5; 325 MG/1; MG/1
5-325 TABLET ORAL
Qty: 30 | Refills: 0 | Status: DISCONTINUED | COMMUNITY
Start: 2018-03-17 | End: 2019-07-11

## 2019-07-11 RX ORDER — PANTOPRAZOLE 40 MG/1
40 TABLET, DELAYED RELEASE ORAL
Qty: 1 | Refills: 0 | Status: DISCONTINUED | COMMUNITY
Start: 2018-03-17 | End: 2019-07-11

## 2019-07-11 RX ORDER — FUROSEMIDE 20 MG/1
20 TABLET ORAL
Qty: 90 | Refills: 0 | Status: DISCONTINUED | COMMUNITY
Start: 2018-03-17 | End: 2019-07-11

## 2019-07-11 RX ORDER — INSULIN GLARGINE 100 [IU]/ML
100 INJECTION, SOLUTION SUBCUTANEOUS AT BEDTIME
Qty: 45 | Refills: 1 | Status: DISCONTINUED | COMMUNITY
Start: 2018-03-26 | End: 2019-07-11

## 2019-07-11 RX ORDER — AMLODIPINE BESYLATE 10 MG/1
10 TABLET ORAL
Qty: 30 | Refills: 0 | Status: DISCONTINUED | COMMUNITY
Start: 2017-09-19 | End: 2019-07-11

## 2019-07-11 NOTE — OB HISTORY
[Currently In Menopause] : currently in menopause [Menopause Age: ____] : age at menopause was [unfilled] [___] :  Spontaneous: [unfilled]

## 2019-07-13 PROBLEM — D72.829 LEUKOCYTOSIS: Status: ACTIVE | Noted: 2019-07-05

## 2019-07-13 LAB
ALBUMIN SERPL ELPH-MCNC: 4.4 G/DL
ALP BLD-CCNC: 57 U/L
ALT SERPL-CCNC: 40 U/L
ANION GAP SERPL CALC-SCNC: 12 MMOL/L
AST SERPL-CCNC: 27 U/L
B2 MICROGLOB SERPL-MCNC: 1.9 MG/L
BILIRUB SERPL-MCNC: 0.8 MG/DL
BUN SERPL-MCNC: 17 MG/DL
CALCIUM SERPL-MCNC: 9.6 MG/DL
CHLORIDE SERPL-SCNC: 103 MMOL/L
CO2 SERPL-SCNC: 25 MMOL/L
CREAT SERPL-MCNC: 0.81 MG/DL
GLUCOSE SERPL-MCNC: 132 MG/DL
LDH SERPL-CCNC: 197 U/L
POTASSIUM SERPL-SCNC: 4.6 MMOL/L
PROT SERPL-MCNC: 7.5 G/DL
SODIUM SERPL-SCNC: 140 MMOL/L

## 2019-07-13 NOTE — HISTORY OF PRESENT ILLNESS
[de-identified] : 54 F with past medical history of diabetes, hypertension, multivessel CAD, status post CABG x4, referred for hematologic consultation due to elevated WBC. Patient is minimally symptomatic. Since August 2018, patient's WBC fluctuated between 11.6 and 12.8 K, and most recent differential significant for 65% lymphocytes.She states she had previous hematologic work up with bone marrow biopsy, but does not recall the name of her hematologist or the year of the procedure. Patient has significant cardiac history, and does not want another invasive procedure. Her hematologic picture (leukocytosis with lymphocytosis) is not new. Denies B symptoms except for fatigue, which is likely induced by recent cardiac procedure. Patient originally from [place]. Speaks English. [FreeTextEntry1] : expectant surveillance\par \par

## 2019-07-13 NOTE — CONSULT LETTER
[Dear  ___] : Dear  [unfilled], [Courtesy Letter:] : I had the pleasure of seeing your patient, [unfilled], in my office today. [Please see my note below.] : Please see my note below. [Consult Closing:] : Thank you very much for allowing me to participate in the care of this patient.  If you have any questions, please do not hesitate to contact me. [Sincerely,] : Sincerely, [FreeTextEntry3] : BOY JONES M.D.\par Hematology/ Oncology\par [facility]\par \par  [FreeTextEntry2] : Angelica Thayer M.D.

## 2019-07-13 NOTE — REVIEW OF SYSTEMS
[Negative] : Allergic/Immunologic [Fatigue] : fatigue [Recent Change In Weight] : ~T no recent weight change

## 2019-07-13 NOTE — REASON FOR VISIT
[Initial Consultation] : an initial consultation for [Leukocytosis] : leukocytosis [FreeTextEntry2] : with lymphocytosis

## 2019-07-13 NOTE — ASSESSMENT
[FreeTextEntry1] : Ms. WINKLER 's questions were answered to her satisfaction. She expressed her understanding and willingness to comply with the above recommendations, and  will return to the office to review the results of the blood tests in 3 months.

## 2019-07-15 ENCOUNTER — APPOINTMENT (OUTPATIENT)
Dept: GASTROENTEROLOGY | Facility: CLINIC | Age: 54
End: 2019-07-15

## 2019-07-16 LAB — JAK2 GENE MUT ANL BLD/T: NORMAL

## 2019-07-17 LAB — T(9;22)(ABL1,BCR)/CONTROL BLD/T: NORMAL

## 2020-02-19 ENCOUNTER — APPOINTMENT (OUTPATIENT)
Dept: ULTRASOUND IMAGING | Facility: CLINIC | Age: 55
End: 2020-02-19
Payer: MEDICAID

## 2020-02-19 ENCOUNTER — OUTPATIENT (OUTPATIENT)
Dept: OUTPATIENT SERVICES | Facility: HOSPITAL | Age: 55
LOS: 1 days | End: 2020-02-19
Payer: MEDICAID

## 2020-02-19 DIAGNOSIS — Z00.8 ENCOUNTER FOR OTHER GENERAL EXAMINATION: ICD-10-CM

## 2020-02-19 PROCEDURE — 76700 US EXAM ABDOM COMPLETE: CPT

## 2020-02-19 PROCEDURE — 76700 US EXAM ABDOM COMPLETE: CPT | Mod: 26

## 2020-07-07 ENCOUNTER — APPOINTMENT (OUTPATIENT)
Dept: MAMMOGRAPHY | Facility: CLINIC | Age: 55
End: 2020-07-07

## 2020-10-13 ENCOUNTER — OUTPATIENT (OUTPATIENT)
Dept: OUTPATIENT SERVICES | Facility: HOSPITAL | Age: 55
LOS: 1 days | End: 2020-10-13
Payer: MEDICAID

## 2020-10-13 ENCOUNTER — APPOINTMENT (OUTPATIENT)
Dept: MAMMOGRAPHY | Facility: CLINIC | Age: 55
End: 2020-10-13
Payer: MEDICAID

## 2020-10-13 DIAGNOSIS — Z00.8 ENCOUNTER FOR OTHER GENERAL EXAMINATION: ICD-10-CM

## 2020-10-13 PROCEDURE — 77063 BREAST TOMOSYNTHESIS BI: CPT

## 2020-10-13 PROCEDURE — 77067 SCR MAMMO BI INCL CAD: CPT | Mod: 26

## 2020-10-13 PROCEDURE — 77067 SCR MAMMO BI INCL CAD: CPT

## 2020-10-13 PROCEDURE — 77063 BREAST TOMOSYNTHESIS BI: CPT | Mod: 26

## 2020-10-20 NOTE — PROGRESS NOTE ADULT - PROBLEM/PLAN-2
[de-identified] Year Well Child Check    Silas Marin is a 2 y.o. female here for well child exam parent    Current Parental concerns    none    Chart elements reviewed    Immunizations, Growth Chart, Development    Hearing Screen  passed, see charting for complete results. Vision Screen  Plus Optix PASS    REVIEW OF LIFESTYLE  Completely toilet trained during the day?: Yes  Problems with sleeping: no  Does child snore?: no  Rides in a car seat?: Yes  Sees the dentist regularly?: Yes    Does child go to ?: Yes    Has working smoke alarms and carbon monoxide detectors at home?:  Yes  Secondhand smoke exposure?: no  Guns/weapons in the home?: no   setting:    : 5 days per week, 9 hrs per day    Diet    Eats a variety of food-fruit/meat/veg?:  yes  Drinks: water    Screen need for lipid panel:   Family history of high cholesterol?: Yes   Family history of heart attack before the age of 48 years?: No   Family history of obesity or type 2 diabetes?: Yes   Family history of heart disease?: Yes     Birth History    Birth     Weight: 6 lb 4.2 oz (2.84 kg)    Apgar     One: 9.0     Five: 9.0    Delivery Method: Vaginal, Spontaneous    Gestation Age: 44 1/7 wks       Chart elements reviewed by provider   Immunizations, Growth Chart, Development, Past Medical and Surgical History, Allergies, Family and Social History, and Medications      IMMUNIZATIONS  Immunization History   Administered Date(s) Administered    Hepatitis B (Recombivax HB) 2016    Influenza, Quadv, IM, PF (6 mo and older Fluzone, Flulaval, Fluarix, and 3 yrs and older Afluria) 10/23/2019         ROS  Constitutional:  Denies fever. Sleeping normally. Developmentally appropriate. Eyes:  Denies eye drainage or redness, no concerns for vision. HENT:  Denies nasal congestion or ear drainage, no concerns for hearing. Respiratory:  Denies cough or troubles breathing. Cardiovascular:  Denies cyanosis or extremity swelling.    GI: Denies vomiting, bloody stools, constipation, or diarrhea. Child is feeding well. :  Denies decrease in urination. Potty trained well during the day. No blood noted. Musculoskeletal:  Denies joint redness or swelling. Normal movement of extremities. Integument:  Denies rash  Neurologic:  Denies focal weakness, no altered level of consciousness  Endocrine:  Denies polyuria, no development of secondary sex characteristics  Lymphatic:  Denies swollen glands or edema. Behavior/Psych:  No signs of depression or mood disorder. Physical Exam      Vital Signs:  /58 (Site: Right Upper Arm, Position: Sitting, Cuff Size: Child)   Pulse 102   Temp 98.1 °F (36.7 °C) (Infrared)   Ht 40.16\" (102 cm)   Wt 38 lb 6.4 oz (17.4 kg)   BMI 16.74 kg/m²  84 %ile (Z= 1.00) based on [facility] (Girls, 2-20 Years) BMI-for-age based on BMI available as of 10/20/2020. 75 %ile (Z= 0.66) based on [facility] (Girls, 2-20 Years) weight-for-age data using vitals from 10/20/2020. 58 %ile (Z= 0.21) based on [facility] (Girls, 2-20 Years) Stature-for-age data based on Stature recorded on 10/20/2020. General:  Alert, interactive and appropriate, well nourished and well-appearing,  and Non-obese, BMI 84%  Head:  Normocephalic, atraumatic. Eyes:  No drainage. Conjunctiva clear. Bilateral red reflex present. EOMs intact, without strabismus. PERRL. Corneal light reflex symmetrical bilaterally, negative cover/uncover test bilaterally  Ears:  External ears normal, TM's normal.  Nose:  Nares normal, no drainage  Mouth:  Oropharynx normal, pink moist mucous membranes, skin intact, no lesions. Teeth intact without abscess or caries  Neck:  Symmetric, supple, full range of motion, no tenderness, no masses, thyroid normal.  Chest:  Symmetrical  Respiratory:  Breathing not labored. Normal respiratory rate. Chest clear to auscultation. Heart:  Regular rate and rhythm, normal S1 and S2, femoral pulses full and symmetric.  Brisk cap refill  Murmur:  no murmur noted  Abdomen:  Soft, nontender, nondistended, normal bowel sounds, no hepatosplenomegaly or abnormal masses. Genitals:  normal female external genitalia, pelvic not performed  Lymphatic:  No cervical, inguinal, or axillary adenopathy. Musculoskeletal:  Back straight and symmetric, no midline defects. Normal posture. Steady gait normal for age. Hips with normal and symmetric range of motion. Leg length symmetric. Skin:  No rashes, lesions, indurations, or cyanosis. Pink. Neuro:  Normal tone and movement bilaterally. CN 2-12 intact     Psychosocial: Parents interact well with child, interested, asking appropriate questions, loving toward child. Child  does interact appropriately with adults and other children, follow directions and rules within reason, has typical behavior and interaction for age. Developmental exam (objective)  Hop: Yes  Knows shapes: Yes  Knows colors: Yes  Jumps on one foot: Yes and not observed  Speech is 100% intelligible: Yes      IMPRESSION / PLAN   Diagnosis Orders   1. Encounter for routine child health examination without abnormal findings  MO INSTRUMENT BASED OCULAR SCR BI W/ONSITE ANALYSIS    MO PURE TONE HEARING TEST, AIR   2. Need for vaccination         Healthy, happy 2 y.o. female  Meeting milestones for gross motor, fine motor, language and socialization. [facility] well in  at [facility] 43 at next well visit: Influenza 2021    Return in about 1 year (around 10/20/2021) for well child exam.      Anticipatory guidance discussed or covered in handout given to family:     Dealing with strangers   Booster seat required until  8 yrs or 4'9\"   Helmet for bikes, skateboards, etc.   Street safety   Reading with child   Limit screen time to < 2 hours daily   Healthy eating habits   Adequate exercise   Discipline    There are no Patient Instructions on file for this visit.     I have reviewed and agree with documentation per clinical staff, and have made any necessary adjustments.   Electronically signed by WILBUR Carmona CNP on 10/21/2020 at 5:55 PM Please note that portions of this note were completed with a voice recognition program. Efforts were made to edit the dictations but occasionally words are mis-transcribed.) DISPLAY PLAN FREE TEXT

## 2020-12-08 ENCOUNTER — APPOINTMENT (OUTPATIENT)
Dept: GYNECOLOGIC ONCOLOGY | Facility: CLINIC | Age: 55
End: 2020-12-08
Payer: MEDICAID

## 2020-12-08 VITALS
BODY MASS INDEX: 26.46 KG/M2 | WEIGHT: 155 LBS | HEART RATE: 53 BPM | SYSTOLIC BLOOD PRESSURE: 147 MMHG | HEIGHT: 64 IN | DIASTOLIC BLOOD PRESSURE: 82 MMHG

## 2020-12-08 PROCEDURE — 99072 ADDL SUPL MATRL&STAF TM PHE: CPT

## 2020-12-08 PROCEDURE — 99203 OFFICE O/P NEW LOW 30 MIN: CPT | Mod: 25

## 2020-12-08 PROCEDURE — 57420 EXAM OF VAGINA W/SCOPE: CPT

## 2020-12-18 ENCOUNTER — OUTPATIENT (OUTPATIENT)
Dept: OUTPATIENT SERVICES | Facility: HOSPITAL | Age: 55
LOS: 1 days | End: 2020-12-18
Payer: MEDICAID

## 2020-12-18 VITALS
OXYGEN SATURATION: 100 % | HEIGHT: 64 IN | DIASTOLIC BLOOD PRESSURE: 66 MMHG | SYSTOLIC BLOOD PRESSURE: 130 MMHG | WEIGHT: 154.1 LBS | RESPIRATION RATE: 16 BRPM | HEART RATE: 49 BPM | TEMPERATURE: 98 F

## 2020-12-18 DIAGNOSIS — D06.9 CARCINOMA IN SITU OF CERVIX, UNSPECIFIED: ICD-10-CM

## 2020-12-18 DIAGNOSIS — Z01.818 ENCOUNTER FOR OTHER PREPROCEDURAL EXAMINATION: ICD-10-CM

## 2020-12-18 DIAGNOSIS — Z95.1 PRESENCE OF AORTOCORONARY BYPASS GRAFT: Chronic | ICD-10-CM

## 2020-12-18 LAB
A1C WITH ESTIMATED AVERAGE GLUCOSE RESULT: 6.1 % — HIGH (ref 4–5.6)
ANION GAP SERPL CALC-SCNC: 6 MMOL/L — SIGNIFICANT CHANGE UP (ref 5–17)
APTT BLD: 34.1 SEC — SIGNIFICANT CHANGE UP (ref 27.5–35.5)
BASOPHILS # BLD AUTO: 0.07 K/UL — SIGNIFICANT CHANGE UP (ref 0–0.2)
BASOPHILS NFR BLD AUTO: 0.5 % — SIGNIFICANT CHANGE UP (ref 0–2)
BUN SERPL-MCNC: 27 MG/DL — HIGH (ref 7–23)
CALCIUM SERPL-MCNC: 9.4 MG/DL — SIGNIFICANT CHANGE UP (ref 8.5–10.1)
CHLORIDE SERPL-SCNC: 106 MMOL/L — SIGNIFICANT CHANGE UP (ref 96–108)
CO2 SERPL-SCNC: 27 MMOL/L — SIGNIFICANT CHANGE UP (ref 22–31)
CREAT SERPL-MCNC: 0.86 MG/DL — SIGNIFICANT CHANGE UP (ref 0.5–1.3)
EOSINOPHIL # BLD AUTO: 0.21 K/UL — SIGNIFICANT CHANGE UP (ref 0–0.5)
EOSINOPHIL NFR BLD AUTO: 1.4 % — SIGNIFICANT CHANGE UP (ref 0–6)
ESTIMATED AVERAGE GLUCOSE: 128 MG/DL — HIGH (ref 68–114)
GLUCOSE SERPL-MCNC: 111 MG/DL — HIGH (ref 70–99)
HCT VFR BLD CALC: 41.9 % — SIGNIFICANT CHANGE UP (ref 34.5–45)
HGB BLD-MCNC: 13.4 G/DL — SIGNIFICANT CHANGE UP (ref 11.5–15.5)
IMM GRANULOCYTES NFR BLD AUTO: 0.3 % — SIGNIFICANT CHANGE UP (ref 0–1.5)
INR BLD: 0.99 RATIO — SIGNIFICANT CHANGE UP (ref 0.88–1.16)
LYMPHOCYTES # BLD AUTO: 52.2 % — HIGH (ref 13–44)
LYMPHOCYTES # BLD AUTO: 7.92 K/UL — HIGH (ref 1–3.3)
MCHC RBC-ENTMCNC: 29.6 PG — SIGNIFICANT CHANGE UP (ref 27–34)
MCHC RBC-ENTMCNC: 32 GM/DL — SIGNIFICANT CHANGE UP (ref 32–36)
MCV RBC AUTO: 92.7 FL — SIGNIFICANT CHANGE UP (ref 80–100)
MONOCYTES # BLD AUTO: 0.79 K/UL — SIGNIFICANT CHANGE UP (ref 0–0.9)
MONOCYTES NFR BLD AUTO: 5.2 % — SIGNIFICANT CHANGE UP (ref 2–14)
NEUTROPHILS # BLD AUTO: 6.12 K/UL — SIGNIFICANT CHANGE UP (ref 1.8–7.4)
NEUTROPHILS NFR BLD AUTO: 40.4 % — LOW (ref 43–77)
PLATELET # BLD AUTO: 221 K/UL — SIGNIFICANT CHANGE UP (ref 150–400)
POTASSIUM SERPL-MCNC: 4 MMOL/L — SIGNIFICANT CHANGE UP (ref 3.5–5.3)
POTASSIUM SERPL-SCNC: 4 MMOL/L — SIGNIFICANT CHANGE UP (ref 3.5–5.3)
PROTHROM AB SERPL-ACNC: 11.6 SEC — SIGNIFICANT CHANGE UP (ref 10.6–13.6)
RBC # BLD: 4.52 M/UL — SIGNIFICANT CHANGE UP (ref 3.8–5.2)
RBC # FLD: 12.6 % — SIGNIFICANT CHANGE UP (ref 10.3–14.5)
SARS-COV-2 RNA SPEC QL NAA+PROBE: SIGNIFICANT CHANGE UP
SODIUM SERPL-SCNC: 139 MMOL/L — SIGNIFICANT CHANGE UP (ref 135–145)
WBC # BLD: 15.16 K/UL — HIGH (ref 3.8–10.5)
WBC # FLD AUTO: 15.16 K/UL — HIGH (ref 3.8–10.5)

## 2020-12-18 PROCEDURE — 85025 COMPLETE CBC W/AUTO DIFF WBC: CPT

## 2020-12-18 PROCEDURE — 86900 BLOOD TYPING SEROLOGIC ABO: CPT

## 2020-12-18 PROCEDURE — 93005 ELECTROCARDIOGRAM TRACING: CPT

## 2020-12-18 PROCEDURE — 80048 BASIC METABOLIC PNL TOTAL CA: CPT

## 2020-12-18 PROCEDURE — 86901 BLOOD TYPING SEROLOGIC RH(D): CPT

## 2020-12-18 PROCEDURE — 85730 THROMBOPLASTIN TIME PARTIAL: CPT

## 2020-12-18 PROCEDURE — 83036 HEMOGLOBIN GLYCOSYLATED A1C: CPT

## 2020-12-18 PROCEDURE — G0463: CPT | Mod: 25

## 2020-12-18 PROCEDURE — 93010 ELECTROCARDIOGRAM REPORT: CPT

## 2020-12-18 PROCEDURE — 85610 PROTHROMBIN TIME: CPT

## 2020-12-18 PROCEDURE — 36415 COLL VENOUS BLD VENIPUNCTURE: CPT

## 2020-12-18 PROCEDURE — 86850 RBC ANTIBODY SCREEN: CPT

## 2020-12-18 PROCEDURE — U0003: CPT

## 2020-12-18 RX ORDER — FENTANYL CITRATE 50 UG/ML
50 INJECTION INTRAVENOUS
Refills: 0 | Status: DISCONTINUED | OUTPATIENT
Start: 2020-12-21 | End: 2020-12-21

## 2020-12-18 RX ORDER — SODIUM CHLORIDE 9 MG/ML
3 INJECTION INTRAMUSCULAR; INTRAVENOUS; SUBCUTANEOUS EVERY 8 HOURS
Refills: 0 | Status: DISCONTINUED | OUTPATIENT
Start: 2020-12-21 | End: 2020-12-21

## 2020-12-18 RX ORDER — SODIUM CHLORIDE 9 MG/ML
1000 INJECTION, SOLUTION INTRAVENOUS
Refills: 0 | Status: DISCONTINUED | OUTPATIENT
Start: 2020-12-21 | End: 2020-12-21

## 2020-12-18 RX ORDER — ONDANSETRON 8 MG/1
4 TABLET, FILM COATED ORAL EVERY 6 HOURS
Refills: 0 | Status: DISCONTINUED | OUTPATIENT
Start: 2020-12-21 | End: 2020-12-21

## 2020-12-18 NOTE — H&P PST ADULT - NSANTHOSAYNRD_GEN_A_CORE
No. ELVER screening performed.  STOP BANG Legend: 0-2 = LOW Risk; 3-4 = INTERMEDIATE Risk; 5-8 = HIGH Risk

## 2020-12-18 NOTE — H&P PST ADULT - NSICDXPASTMEDICALHX_GEN_ALL_CORE_FT
PAST MEDICAL HISTORY:  CAD (coronary artery disease)     Cervical carcinoma     DM (diabetes mellitus)     HTN (hypertension)     Hypothyroid      PAST MEDICAL HISTORY:  Abnormal Pap smear of cervix     CAD (coronary artery disease)     DM (diabetes mellitus)     HTN (hypertension)     Hypothyroid

## 2020-12-18 NOTE — H&P PST ADULT - HISTORY OF PRESENT ILLNESS
55 year old female with cervical cancer pt said she had abnormal pap smear denies abdominal pain or bloody vaginal discharge; she presents to PST for planned cold knife cone biopsy of cervix

## 2020-12-18 NOTE — H&P PST ADULT - ASSESSMENT
55 year old female she presents to PST for planned cold knife cone biopsy of cervix     Plan:  1. PST instructions given ; NPO status instructions to be given by ASU   2. Pt instructed to take following meds with sip of water : synthroid metoprolol  3. Pt instructed to take routine evening medications unless indicated   4. Stop NSAIDS ( Aspirin Alev Motrin Mobic Diclofenac), herbal supplements , MVI , Vitamin fish oil 7 days prior to surgery  unless   directed by surgeon or cardiologist;   5. Cardiac Optimization  with Dr Viveros  6. Pt denies covid symptoms shortness of breath fever cough   7. Labs EKG  as per surgeon request   8. Pt instructed to self quarantine   9. Covid Testing scheduled Pt notified and aware

## 2020-12-18 NOTE — H&P PST ADULT - OTHER CARE PROVIDERS
Please call patient's daughter back regarding previous discussion of tomorrows appointments.   Dr Viveros [phone]

## 2020-12-19 DIAGNOSIS — D06.9 CARCINOMA IN SITU OF CERVIX, UNSPECIFIED: ICD-10-CM

## 2020-12-19 DIAGNOSIS — Z01.818 ENCOUNTER FOR OTHER PREPROCEDURAL EXAMINATION: ICD-10-CM

## 2020-12-21 ENCOUNTER — RESULT REVIEW (OUTPATIENT)
Age: 55
End: 2020-12-21

## 2020-12-21 ENCOUNTER — OUTPATIENT (OUTPATIENT)
Dept: INPATIENT UNIT | Facility: HOSPITAL | Age: 55
LOS: 1 days | Discharge: HOME | End: 2020-12-21
Payer: MEDICAID

## 2020-12-21 ENCOUNTER — APPOINTMENT (OUTPATIENT)
Dept: GYNECOLOGIC ONCOLOGY | Facility: HOSPITAL | Age: 55
End: 2020-12-21

## 2020-12-21 VITALS
DIASTOLIC BLOOD PRESSURE: 87 MMHG | TEMPERATURE: 98 F | HEART RATE: 75 BPM | SYSTOLIC BLOOD PRESSURE: 139 MMHG | OXYGEN SATURATION: 98 % | RESPIRATION RATE: 16 BRPM

## 2020-12-21 VITALS
SYSTOLIC BLOOD PRESSURE: 129 MMHG | WEIGHT: 154.1 LBS | TEMPERATURE: 98 F | HEIGHT: 64 IN | DIASTOLIC BLOOD PRESSURE: 76 MMHG | HEART RATE: 54 BPM | RESPIRATION RATE: 196 BRPM | OXYGEN SATURATION: 99 %

## 2020-12-21 DIAGNOSIS — D06.9 CARCINOMA IN SITU OF CERVIX, UNSPECIFIED: ICD-10-CM

## 2020-12-21 DIAGNOSIS — Z95.1 PRESENCE OF AORTOCORONARY BYPASS GRAFT: Chronic | ICD-10-CM

## 2020-12-21 PROBLEM — I25.10 ATHEROSCLEROTIC HEART DISEASE OF NATIVE CORONARY ARTERY WITHOUT ANGINA PECTORIS: Chronic | Status: ACTIVE | Noted: 2020-12-18

## 2020-12-21 PROBLEM — R87.619 UNSPECIFIED ABNORMAL CYTOLOGICAL FINDINGS IN SPECIMENS FROM CERVIX UTERI: Chronic | Status: ACTIVE | Noted: 2020-12-18

## 2020-12-21 PROCEDURE — 57520 CONIZATION OF CERVIX: CPT

## 2020-12-21 PROCEDURE — 88307 TISSUE EXAM BY PATHOLOGIST: CPT | Mod: 26

## 2020-12-21 PROCEDURE — 82962 GLUCOSE BLOOD TEST: CPT

## 2020-12-21 PROCEDURE — 88305 TISSUE EXAM BY PATHOLOGIST: CPT | Mod: 26

## 2020-12-21 PROCEDURE — 88307 TISSUE EXAM BY PATHOLOGIST: CPT

## 2020-12-21 PROCEDURE — 88305 TISSUE EXAM BY PATHOLOGIST: CPT

## 2020-12-21 RX ORDER — INSULIN GLARGINE 100 [IU]/ML
0 INJECTION, SOLUTION SUBCUTANEOUS
Qty: 0 | Refills: 0 | DISCHARGE

## 2020-12-21 RX ORDER — ACETAMINOPHEN 500 MG
975 TABLET ORAL ONCE
Refills: 0 | Status: COMPLETED | OUTPATIENT
Start: 2020-12-21 | End: 2020-12-21

## 2020-12-21 RX ORDER — LISINOPRIL 2.5 MG/1
1 TABLET ORAL
Qty: 0 | Refills: 0 | DISCHARGE

## 2020-12-21 RX ORDER — SODIUM CHLORIDE 9 MG/ML
1000 INJECTION, SOLUTION INTRAVENOUS
Refills: 0 | Status: DISCONTINUED | OUTPATIENT
Start: 2020-12-21 | End: 2020-12-21

## 2020-12-21 RX ORDER — METOPROLOL TARTRATE 50 MG
1 TABLET ORAL
Qty: 0 | Refills: 0 | DISCHARGE

## 2020-12-21 RX ORDER — OXYCODONE HYDROCHLORIDE 5 MG/1
5 TABLET ORAL ONCE
Refills: 0 | Status: DISCONTINUED | OUTPATIENT
Start: 2020-12-21 | End: 2020-12-21

## 2020-12-21 RX ORDER — FAMOTIDINE 10 MG/ML
20 INJECTION INTRAVENOUS ONCE
Refills: 0 | Status: COMPLETED | OUTPATIENT
Start: 2020-12-21 | End: 2020-12-21

## 2020-12-21 RX ORDER — AMLODIPINE BESYLATE 2.5 MG/1
1 TABLET ORAL
Qty: 0 | Refills: 0 | DISCHARGE

## 2020-12-21 RX ADMIN — FENTANYL CITRATE 50 MICROGRAM(S): 50 INJECTION INTRAVENOUS at 08:49

## 2020-12-21 RX ADMIN — Medication 975 MILLIGRAM(S): at 07:17

## 2020-12-21 RX ADMIN — FAMOTIDINE 20 MILLIGRAM(S): 10 INJECTION INTRAVENOUS at 07:15

## 2020-12-21 RX ADMIN — FENTANYL CITRATE 50 MICROGRAM(S): 50 INJECTION INTRAVENOUS at 09:15

## 2020-12-21 RX ADMIN — OXYCODONE HYDROCHLORIDE 5 MILLIGRAM(S): 5 TABLET ORAL at 09:15

## 2020-12-21 RX ADMIN — OXYCODONE HYDROCHLORIDE 5 MILLIGRAM(S): 5 TABLET ORAL at 08:49

## 2020-12-21 RX ADMIN — Medication 975 MILLIGRAM(S): at 07:15

## 2020-12-21 NOTE — BRIEF OPERATIVE NOTE - NSICDXBRIEFPOSTOP_GEN_ALL_CORE_FT
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

## 2020-12-21 NOTE — ASU DISCHARGE PLAN (ADULT/PEDIATRIC) - CARE PROVIDER_API CALL
Yanira Noel)  Gynecologic Oncology; Obstetrics and Gynecology  [address] [place] [address]  Phone: [phone]  Fax: [phone]  Established Patient  Follow Up Time:

## 2020-12-21 NOTE — ASU PATIENT PROFILE, ADULT - PMH
Abnormal Pap smear of cervix    CAD (coronary artery disease)    DM (diabetes mellitus)    HTN (hypertension)    Hypothyroid

## 2020-12-21 NOTE — BRIEF OPERATIVE NOTE - NSICDXBRIEFPREOP_GEN_ALL_CORE_FT
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

## 2020-12-26 DIAGNOSIS — R87.613 HIGH GRADE SQUAMOUS INTRAEPITHELIAL LESION ON CYTOLOGIC SMEAR OF CERVIX (HGSIL): ICD-10-CM

## 2020-12-26 DIAGNOSIS — E11.9 TYPE 2 DIABETES MELLITUS WITHOUT COMPLICATIONS: ICD-10-CM

## 2020-12-26 DIAGNOSIS — Z79.82 LONG TERM (CURRENT) USE OF ASPIRIN: ICD-10-CM

## 2020-12-26 DIAGNOSIS — I25.10 ATHEROSCLEROTIC HEART DISEASE OF NATIVE CORONARY ARTERY WITHOUT ANGINA PECTORIS: ICD-10-CM

## 2020-12-26 DIAGNOSIS — D06.9 CARCINOMA IN SITU OF CERVIX, UNSPECIFIED: ICD-10-CM

## 2020-12-26 DIAGNOSIS — I10 ESSENTIAL (PRIMARY) HYPERTENSION: ICD-10-CM

## 2020-12-26 DIAGNOSIS — Z79.4 LONG TERM (CURRENT) USE OF INSULIN: ICD-10-CM

## 2020-12-26 DIAGNOSIS — Z79.01 LONG TERM (CURRENT) USE OF ANTICOAGULANTS: ICD-10-CM

## 2020-12-26 DIAGNOSIS — E03.9 HYPOTHYROIDISM, UNSPECIFIED: ICD-10-CM

## 2020-12-26 DIAGNOSIS — N72 INFLAMMATORY DISEASE OF CERVIX UTERI: ICD-10-CM

## 2020-12-26 DIAGNOSIS — R07.89 OTHER CHEST PAIN: ICD-10-CM

## 2020-12-26 DIAGNOSIS — Z77.22 CONTACT WITH AND (SUSPECTED) EXPOSURE TO ENVIRONMENTAL TOBACCO SMOKE (ACUTE) (CHRONIC): ICD-10-CM

## 2020-12-26 DIAGNOSIS — R06.09 OTHER FORMS OF DYSPNEA: ICD-10-CM

## 2020-12-26 DIAGNOSIS — E78.2 MIXED HYPERLIPIDEMIA: ICD-10-CM

## 2020-12-26 DIAGNOSIS — Z95.1 PRESENCE OF AORTOCORONARY BYPASS GRAFT: ICD-10-CM

## 2020-12-30 NOTE — H&P PST ADULT - NSANTHTIREDRD_ENT_A_CORE
No Otezla Counseling: The side effects of Otezla were discussed with the patient, including but not limited to worsening or new depression, weight loss, diarrhea, nausea, upper respiratory tract infection, and headache. Patient instructed to call the office should any adverse effect occur.  The patient verbalized understanding of the proper use and possible adverse effects of Otezla.  All the patient's questions and concerns were addressed.

## 2021-01-05 ENCOUNTER — APPOINTMENT (OUTPATIENT)
Dept: GYNECOLOGIC ONCOLOGY | Facility: CLINIC | Age: 56
End: 2021-01-05
Payer: MEDICAID

## 2021-01-05 PROCEDURE — 99024 POSTOP FOLLOW-UP VISIT: CPT

## 2021-01-14 ENCOUNTER — LABORATORY RESULT (OUTPATIENT)
Age: 56
End: 2021-01-14

## 2021-01-16 LAB
BASOPHILS # BLD AUTO: 0.06 K/UL
BASOPHILS NFR BLD AUTO: 0.4 %
EOSINOPHIL # BLD AUTO: 0.2 K/UL
EOSINOPHIL NFR BLD AUTO: 1.5 %
HCT VFR BLD CALC: 40.9 %
HGB BLD-MCNC: 12.9 G/DL
IMM GRANULOCYTES NFR BLD AUTO: 0.2 %
LYMPHOCYTES # BLD AUTO: 7.23 K/UL
LYMPHOCYTES NFR BLD AUTO: 53.4 %
MAN DIFF?: NORMAL
MCHC RBC-ENTMCNC: 30.1 PG
MCHC RBC-ENTMCNC: 31.5 GM/DL
MCV RBC AUTO: 95.6 FL
MONOCYTES # BLD AUTO: 0.78 K/UL
MONOCYTES NFR BLD AUTO: 5.8 %
NEUTROPHILS # BLD AUTO: 5.25 K/UL
NEUTROPHILS NFR BLD AUTO: 38.7 %
PLATELET # BLD AUTO: 299 K/UL
RBC # BLD: 4.28 M/UL
RBC # FLD: 13.4 %
WBC # FLD AUTO: 13.55 K/UL

## 2021-01-19 ENCOUNTER — APPOINTMENT (OUTPATIENT)
Dept: GYNECOLOGIC ONCOLOGY | Facility: CLINIC | Age: 56
End: 2021-01-19
Payer: MEDICAID

## 2021-01-19 DIAGNOSIS — Z87.42 PERSONAL HISTORY OF OTHER DISEASES OF THE FEMALE GENITAL TRACT: ICD-10-CM

## 2021-01-19 PROCEDURE — 99024 POSTOP FOLLOW-UP VISIT: CPT

## 2021-07-06 ENCOUNTER — NON-APPOINTMENT (OUTPATIENT)
Age: 56
End: 2021-07-06

## 2021-07-06 ENCOUNTER — APPOINTMENT (OUTPATIENT)
Dept: GYNECOLOGIC ONCOLOGY | Facility: CLINIC | Age: 56
End: 2021-07-06

## 2021-09-20 NOTE — ASU PATIENT PROFILE, ADULT - ANESTHESIA, PREVIOUS REACTION, PROFILE
Medical Social Work    MSW received a call from Cat with [facility] stating they have no female adolescent beds at this time.   none

## 2021-10-20 NOTE — ED PROVIDER NOTE - GASTROINTESTINAL, MLM
Assumed cares while primary nurse went on lunch break from 1140 to 1210.     Abdomen soft, non-tender, no guarding.

## 2021-11-23 ENCOUNTER — APPOINTMENT (OUTPATIENT)
Dept: GYNECOLOGIC ONCOLOGY | Facility: CLINIC | Age: 56
End: 2021-11-23
Payer: MEDICAID

## 2021-11-23 VITALS
HEART RATE: 55 BPM | WEIGHT: 154 LBS | DIASTOLIC BLOOD PRESSURE: 76 MMHG | BODY MASS INDEX: 26.29 KG/M2 | SYSTOLIC BLOOD PRESSURE: 134 MMHG | HEIGHT: 64 IN

## 2021-11-23 DIAGNOSIS — Z09 ENCOUNTER FOR FOLLOW-UP EXAMINATION AFTER COMPLETED TREATMENT FOR CONDITIONS OTHER THAN MALIGNANT NEOPLASM: ICD-10-CM

## 2021-11-23 DIAGNOSIS — D06.9 CARCINOMA IN SITU OF CERVIX, UNSPECIFIED: ICD-10-CM

## 2021-11-23 PROCEDURE — 57452 EXAM OF CERVIX W/SCOPE: CPT

## 2021-11-23 PROCEDURE — 99212 OFFICE O/P EST SF 10 MIN: CPT | Mod: 25

## 2021-11-29 LAB — HPV HIGH+LOW RISK DNA PNL CVX: NOT DETECTED

## 2021-12-16 LAB — CYTOLOGY CVX/VAG DOC THIN PREP: NORMAL

## 2021-12-17 ENCOUNTER — NON-APPOINTMENT (OUTPATIENT)
Age: 56
End: 2021-12-17

## 2022-02-22 ENCOUNTER — APPOINTMENT (OUTPATIENT)
Dept: MAMMOGRAPHY | Facility: CLINIC | Age: 57
End: 2022-02-22

## 2022-11-11 ENCOUNTER — OUTPATIENT (OUTPATIENT)
Dept: OUTPATIENT SERVICES | Facility: HOSPITAL | Age: 57
LOS: 1 days | End: 2022-11-11
Payer: MEDICAID

## 2022-11-11 ENCOUNTER — APPOINTMENT (OUTPATIENT)
Dept: MAMMOGRAPHY | Facility: CLINIC | Age: 57
End: 2022-11-11

## 2022-11-11 DIAGNOSIS — E11.9 TYPE 2 DIABETES MELLITUS WITHOUT COMPLICATIONS: ICD-10-CM

## 2022-11-11 DIAGNOSIS — Z95.1 PRESENCE OF AORTOCORONARY BYPASS GRAFT: Chronic | ICD-10-CM

## 2022-11-11 DIAGNOSIS — Z00.8 ENCOUNTER FOR OTHER GENERAL EXAMINATION: ICD-10-CM

## 2022-11-11 PROCEDURE — 77067 SCR MAMMO BI INCL CAD: CPT

## 2022-11-11 PROCEDURE — 77067 SCR MAMMO BI INCL CAD: CPT | Mod: 26

## 2022-11-11 PROCEDURE — 77063 BREAST TOMOSYNTHESIS BI: CPT

## 2022-11-11 PROCEDURE — 77063 BREAST TOMOSYNTHESIS BI: CPT | Mod: 26

## 2022-11-22 ENCOUNTER — APPOINTMENT (OUTPATIENT)
Dept: GYNECOLOGIC ONCOLOGY | Facility: CLINIC | Age: 57
End: 2022-11-22

## 2022-11-22 VITALS
DIASTOLIC BLOOD PRESSURE: 81 MMHG | WEIGHT: 152 LBS | SYSTOLIC BLOOD PRESSURE: 144 MMHG | HEIGHT: 64 IN | BODY MASS INDEX: 25.95 KG/M2 | HEART RATE: 62 BPM

## 2022-11-22 PROCEDURE — 57452 EXAM OF CERVIX W/SCOPE: CPT

## 2022-11-22 PROCEDURE — 99213 OFFICE O/P EST LOW 20 MIN: CPT | Mod: 25

## 2022-12-01 LAB — HPV HIGH+LOW RISK DNA PNL CVX: NOT DETECTED

## 2023-01-03 LAB — CYTOLOGY CVX/VAG DOC THIN PREP: NORMAL

## 2023-01-03 NOTE — PHYSICAL EXAM
[Chaperone Present] : A chaperone was present in the examining room during all aspects of the physical examination [Normal] : Anus and perineum: Normal sphincter tone, no masses, no prolapse. [Fully active, able to carry on all pre-disease performance without restriction] : Status 0 - Fully active, able to carry on all pre-disease performance without restriction [de-identified] : no liesions or nodules [de-identified] : b/l adnexa nonpalpable on bimanual exam.

## 2023-01-03 NOTE — LETTER BODY
[Dear  ___] : Dear  [unfilled], [I recently saw our patient [unfilled] for a follow-up visit.] : I recently saw our patient, [unfilled] for a follow-up visit. [FreeTextEntry2] : PAP was negative this visit. \par She will see me in 6 months for followup and will continue to see you for routine GYN care. I will be performing her PAP smears. I will keep you updated on her progress. Thank you again for referring this antione patient. [FreeTextEntry1] : pap/hpv

## 2023-01-03 NOTE — PROCEDURE
[Colposcopy] : colposcopy [Cervical Dysplasia] : cervical dysplasia [Patient] : the patient [Verbal Consent] : verbal consent was obtained prior to the procedure and is detailed in the patient's record [Site Verification] : site verification performed. [Time Out] : Time Out Procedure:The following was confirmed prior to the procedure-Correct patient identity, correct site, agreement on the procedure to be done and correct patient position. [Cervical Pap Performed] : a cervical pap smear was performed [No Abnormalities] : no abnormalities seen [Silver Nitrate] : silver nitrate [No Complications] : none [Tolerated Well] : the patient tolerated the procedure well [Post procedure instructions and information given] : post procedure instructions and information given and reviewed with patient. [1] : 1 [SCJ Fully Visualized] : the squamocolumnar junction was not fully visualized [de-identified] : epithelialized external os was again noted; incised slightly with an #11 blade to permit passage of endocervical brush.

## 2023-01-03 NOTE — HISTORY OF PRESENT ILLNESS
[FreeTextEntry1] : Ms. Javier is a   referred by Dr. Yanira Beltran, for high grade cervical dysplasia.\par She is postmenopausal, LMP 2017. \par \par She reports that she went for her first GYN exam in a long time, many years per patient, and denies any prior h/o abnormal PAPs or cervical procedures. \par \par Pap- 10/26/2020- HSIL, with glandular component\par \par Colposcopy- 2020- \par   Cervix at 8- at least HSIL; CIN3\par   ECC- benign\par   EMB- negative\par \par 2020 [facility] biopsy:  \par -  High grade squamous intraepithelial lesion (moderate-severe dysplasia, CATALINO II-III) with focal endocervical gland involvement.  Margins clear.  \par -  ECC:  Benign endocervical tissue and epithelium.\par \par She had two postop visits in 2021 and did not return for her scheduled followup in 2021. \par In 2021, PAP was negative/HRHPV (-).\par She didn’t return in 2022 as scheduled.\par She hasn’t seen anyone in the interim. 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 \par FH: no cancer history\par \par She denies any vaginal bleeding, postcoital spotting, abnormal discharge, pelvic pain or any other associated signs or symptoms. \par \par Health Maintenance:\par Mammogram: 22 BIRADS1\par Colonoscopy: many years ago per patient; aware due\par PAP:  as above\par \par Referred by Dr. Beltran (GYN)\par PCP: [facility]- Dr. Taj Thayer\par Cardiologist: Dr. Viveros ([facility])

## 2023-01-05 ENCOUNTER — NON-APPOINTMENT (OUTPATIENT)
Age: 58
End: 2023-01-05

## 2023-06-03 ENCOUNTER — NON-APPOINTMENT (OUTPATIENT)
Age: 58
End: 2023-06-03

## 2023-06-09 ENCOUNTER — EMERGENCY (EMERGENCY)
Facility: HOSPITAL | Age: 58
LOS: 0 days | Discharge: HOME | End: 2023-06-10
Attending: HOSPITALIST
Payer: MEDICAID

## 2023-06-09 VITALS
OXYGEN SATURATION: 97 % | HEART RATE: 67 BPM | RESPIRATION RATE: 16 BRPM | TEMPERATURE: 98 F | SYSTOLIC BLOOD PRESSURE: 133 MMHG | DIASTOLIC BLOOD PRESSURE: 65 MMHG

## 2023-06-09 DIAGNOSIS — Z95.1 PRESENCE OF AORTOCORONARY BYPASS GRAFT: Chronic | ICD-10-CM

## 2023-06-09 DIAGNOSIS — I10 ESSENTIAL (PRIMARY) HYPERTENSION: ICD-10-CM

## 2023-06-09 DIAGNOSIS — E78.5 HYPERLIPIDEMIA, UNSPECIFIED: ICD-10-CM

## 2023-06-09 DIAGNOSIS — E11.9 TYPE 2 DIABETES MELLITUS WITHOUT COMPLICATIONS: ICD-10-CM

## 2023-06-09 DIAGNOSIS — R06.02 SHORTNESS OF BREATH: ICD-10-CM

## 2023-06-09 DIAGNOSIS — J32.9 CHRONIC SINUSITIS, UNSPECIFIED: ICD-10-CM

## 2023-06-09 DIAGNOSIS — E03.9 HYPOTHYROIDISM, UNSPECIFIED: ICD-10-CM

## 2023-06-09 DIAGNOSIS — Z20.822 CONTACT WITH AND (SUSPECTED) EXPOSURE TO COVID-19: ICD-10-CM

## 2023-06-09 DIAGNOSIS — Z95.1 PRESENCE OF AORTOCORONARY BYPASS GRAFT: ICD-10-CM

## 2023-06-09 DIAGNOSIS — Z79.4 LONG TERM (CURRENT) USE OF INSULIN: ICD-10-CM

## 2023-06-09 DIAGNOSIS — Z79.02 LONG TERM (CURRENT) USE OF ANTITHROMBOTICS/ANTIPLATELETS: ICD-10-CM

## 2023-06-09 DIAGNOSIS — I25.10 ATHEROSCLEROTIC HEART DISEASE OF NATIVE CORONARY ARTERY WITHOUT ANGINA PECTORIS: ICD-10-CM

## 2023-06-09 DIAGNOSIS — Z79.82 LONG TERM (CURRENT) USE OF ASPIRIN: ICD-10-CM

## 2023-06-09 LAB
HCT VFR BLD CALC: 38.3 % — SIGNIFICANT CHANGE UP (ref 34.5–45)
HGB BLD-MCNC: 12.2 G/DL — SIGNIFICANT CHANGE UP (ref 11.5–15.5)
MCHC RBC-ENTMCNC: 28.5 PG — SIGNIFICANT CHANGE UP (ref 27–34)
MCHC RBC-ENTMCNC: 31.9 GM/DL — LOW (ref 32–36)
MCV RBC AUTO: 89.5 FL — SIGNIFICANT CHANGE UP (ref 80–100)
PLATELET # BLD AUTO: 258 K/UL — SIGNIFICANT CHANGE UP (ref 150–400)
RBC # BLD: 4.28 M/UL — SIGNIFICANT CHANGE UP (ref 3.8–5.2)
RBC # FLD: 13.3 % — SIGNIFICANT CHANGE UP (ref 10.3–14.5)
WBC # BLD: 15.33 K/UL — HIGH (ref 3.8–10.5)
WBC # FLD AUTO: 15.33 K/UL — HIGH (ref 3.8–10.5)

## 2023-06-09 PROCEDURE — 83880 ASSAY OF NATRIURETIC PEPTIDE: CPT

## 2023-06-09 PROCEDURE — 99284 EMERGENCY DEPT VISIT MOD MDM: CPT

## 2023-06-09 PROCEDURE — 99285 EMERGENCY DEPT VISIT HI MDM: CPT | Mod: 25

## 2023-06-09 PROCEDURE — 0225U NFCT DS DNA&RNA 21 SARSCOV2: CPT

## 2023-06-09 PROCEDURE — 71045 X-RAY EXAM CHEST 1 VIEW: CPT

## 2023-06-09 PROCEDURE — 71045 X-RAY EXAM CHEST 1 VIEW: CPT | Mod: 26

## 2023-06-09 PROCEDURE — 80053 COMPREHEN METABOLIC PANEL: CPT

## 2023-06-09 PROCEDURE — 96374 THER/PROPH/DIAG INJ IV PUSH: CPT

## 2023-06-09 PROCEDURE — 71250 CT THORAX DX C-: CPT | Mod: MA

## 2023-06-09 PROCEDURE — 93010 ELECTROCARDIOGRAM REPORT: CPT

## 2023-06-09 PROCEDURE — 84484 ASSAY OF TROPONIN QUANT: CPT

## 2023-06-09 PROCEDURE — 93005 ELECTROCARDIOGRAM TRACING: CPT

## 2023-06-09 PROCEDURE — 36415 COLL VENOUS BLD VENIPUNCTURE: CPT

## 2023-06-09 PROCEDURE — 94640 AIRWAY INHALATION TREATMENT: CPT

## 2023-06-09 PROCEDURE — 85025 COMPLETE CBC W/AUTO DIFF WBC: CPT

## 2023-06-09 RX ORDER — IPRATROPIUM/ALBUTEROL SULFATE 18-103MCG
3 AEROSOL WITH ADAPTER (GRAM) INHALATION ONCE
Refills: 0 | Status: COMPLETED | OUTPATIENT
Start: 2023-06-09 | End: 2023-06-09

## 2023-06-09 RX ADMIN — Medication 3 MILLILITER(S): at 23:37

## 2023-06-09 RX ADMIN — Medication 125 MILLIGRAM(S): at 23:37

## 2023-06-09 NOTE — ED STATDOCS - PROGRESS NOTE DETAILS
59 yo F with pmhx CABG, HTN, HLD, DM with c/o 3 weeks of cough, SOB and ankle edema. Pt went to  approx 1 week ago and was given tessalon and a nasal spray, no relief. No CP. No fever. Pt states sxs started when housework started that is creating dust in her home.  PMD Dr Thayer. Pt sent to [facility] for further eval. MD EDDIE

## 2023-06-09 NOTE — ED ADULT TRIAGE NOTE - CHIEF COMPLAINT QUOTE
Ambulatory to ER with c/o cough x 3 weeks patient denies fever. Seen in urgent care 1 week ago covid negative, patient given nasal spray and cough medicine with no releif.

## 2023-06-10 VITALS
DIASTOLIC BLOOD PRESSURE: 73 MMHG | OXYGEN SATURATION: 95 % | SYSTOLIC BLOOD PRESSURE: 143 MMHG | TEMPERATURE: 98 F | HEART RATE: 77 BPM | RESPIRATION RATE: 16 BRPM

## 2023-06-10 LAB
ALBUMIN SERPL ELPH-MCNC: 3.9 G/DL — SIGNIFICANT CHANGE UP (ref 3.3–5)
ALP SERPL-CCNC: 49 U/L — SIGNIFICANT CHANGE UP (ref 40–120)
ALT FLD-CCNC: 38 U/L — SIGNIFICANT CHANGE UP (ref 12–78)
ANION GAP SERPL CALC-SCNC: 2 MMOL/L — LOW (ref 5–17)
AST SERPL-CCNC: 19 U/L — SIGNIFICANT CHANGE UP (ref 15–37)
BASOPHILS # BLD AUTO: 0 K/UL — SIGNIFICANT CHANGE UP (ref 0–0.2)
BASOPHILS NFR BLD AUTO: 0 % — SIGNIFICANT CHANGE UP (ref 0–2)
BILIRUB SERPL-MCNC: 0.4 MG/DL — SIGNIFICANT CHANGE UP (ref 0.2–1.2)
BUN SERPL-MCNC: 32 MG/DL — HIGH (ref 7–23)
CALCIUM SERPL-MCNC: 9.8 MG/DL — SIGNIFICANT CHANGE UP (ref 8.5–10.1)
CHLORIDE SERPL-SCNC: 110 MMOL/L — HIGH (ref 96–108)
CO2 SERPL-SCNC: 29 MMOL/L — SIGNIFICANT CHANGE UP (ref 22–31)
CREAT SERPL-MCNC: 1.01 MG/DL — SIGNIFICANT CHANGE UP (ref 0.5–1.3)
EGFR: 64 ML/MIN/1.73M2 — SIGNIFICANT CHANGE UP
EOSINOPHIL # BLD AUTO: 0.15 K/UL — SIGNIFICANT CHANGE UP (ref 0–0.5)
EOSINOPHIL NFR BLD AUTO: 1 % — SIGNIFICANT CHANGE UP (ref 0–6)
GLUCOSE SERPL-MCNC: 83 MG/DL — SIGNIFICANT CHANGE UP (ref 70–99)
LYMPHOCYTES # BLD AUTO: 57 % — HIGH (ref 13–44)
LYMPHOCYTES # BLD AUTO: 8.74 K/UL — HIGH (ref 1–3.3)
MANUAL SMEAR VERIFICATION: SIGNIFICANT CHANGE UP
MONOCYTES # BLD AUTO: 1.38 K/UL — HIGH (ref 0–0.9)
MONOCYTES NFR BLD AUTO: 9 % — SIGNIFICANT CHANGE UP (ref 2–14)
NEUTROPHILS # BLD AUTO: 5.06 K/UL — SIGNIFICANT CHANGE UP (ref 1.8–7.4)
NEUTROPHILS NFR BLD AUTO: 33 % — LOW (ref 43–77)
NRBC # BLD: 0 /100 — SIGNIFICANT CHANGE UP (ref 0–0)
NRBC # BLD: SIGNIFICANT CHANGE UP /100 WBCS (ref 0–0)
NT-PROBNP SERPL-SCNC: 226 PG/ML — HIGH (ref 0–125)
PLAT MORPH BLD: NORMAL — SIGNIFICANT CHANGE UP
POTASSIUM SERPL-MCNC: 4.3 MMOL/L — SIGNIFICANT CHANGE UP (ref 3.5–5.3)
POTASSIUM SERPL-SCNC: 4.3 MMOL/L — SIGNIFICANT CHANGE UP (ref 3.5–5.3)
PROT SERPL-MCNC: 8.3 GM/DL — SIGNIFICANT CHANGE UP (ref 6–8.3)
RAPID RVP RESULT: SIGNIFICANT CHANGE UP
RBC BLD AUTO: NORMAL — SIGNIFICANT CHANGE UP
SARS-COV-2 RNA SPEC QL NAA+PROBE: SIGNIFICANT CHANGE UP
SMUDGE CELLS # BLD: PRESENT — SIGNIFICANT CHANGE UP
SODIUM SERPL-SCNC: 141 MMOL/L — SIGNIFICANT CHANGE UP (ref 135–145)
TROPONIN I, HIGH SENSITIVITY RESULT: 8.31 NG/L — SIGNIFICANT CHANGE UP

## 2023-06-10 PROCEDURE — 71250 CT THORAX DX C-: CPT | Mod: 26,MA

## 2023-06-10 RX ORDER — SODIUM CHLORIDE 9 MG/ML
500 INJECTION INTRAMUSCULAR; INTRAVENOUS; SUBCUTANEOUS ONCE
Refills: 0 | Status: COMPLETED | OUTPATIENT
Start: 2023-06-10 | End: 2023-06-10

## 2023-06-10 RX ADMIN — SODIUM CHLORIDE 500 MILLILITER(S): 9 INJECTION INTRAMUSCULAR; INTRAVENOUS; SUBCUTANEOUS at 01:16

## 2023-06-10 RX ADMIN — Medication 1 TABLET(S): at 02:54

## 2023-06-10 RX ADMIN — Medication 100 MILLIGRAM(S): at 01:16

## 2023-06-10 NOTE — ED PROVIDER NOTE - PATIENT PORTAL LINK FT
You can access the FollowMyHealth Patient Portal offered by [facility] by registering at the following website: [email or web address]. By joining [employer]’s FollowMyHealth portal, you will also be able to view your health information using other applications (apps) compatible with our system.

## 2023-06-10 NOTE — ED ADULT NURSE NOTE - NS_ED_NURSE_TEACHING_TOPIC_ED_A_ED
Pt called back yesterday and pathology discussed. Will plan to repeat colonoscopy in 2 years.    Deepika Palacios MD     Medications

## 2023-06-10 NOTE — ED PROVIDER NOTE - PHYSICAL EXAMINATION
***GEN - NAD; well appearing; A+O x3 ***HEAD - NC/AT ***EYES/NOSE - + nasal congestion, PERRL, EOMI, mucous membranes moist, no discharge ***THROAT: Oral cavity and pharynx normal. No inflammation, swelling, exudate, or lesions.  ***NECK: Neck supple, non-tender   ***PULMONARY - CTA b/l, symmetric breath sounds. ***CARDIAC -s1s2, RRR, no M,G,R  ***ABDOMEN - +BS, ND, NT, soft, no guarding, no rebound, no masses   ***BACK - no CVA tenderness, Normal  spine ***EXTREMITIES - symmetric pulses, 2+ dp, capillary refill < 2 seconds  ***SKIN - no rash or bruising   ***NEUROLOGIC - alert, CN 2-12 intact

## 2023-06-10 NOTE — ED PROVIDER NOTE - OBJECTIVE STATEMENT
cough for 3 weeks with sinus pressure 59 yo F p/w cough for 3 weeks with sob and sinus pressure.  Patient with PMHx of CABG, HTN, HLD, DM. no fever. sx worse when cleaning in crispin areas of her home. no chest pain.

## 2023-06-10 NOTE — ED PROVIDER NOTE - NSICDXPASTMEDICALHX_GEN_ALL_CORE_FT
PAST MEDICAL HISTORY:  Abnormal Pap smear of cervix     CAD (coronary artery disease)     DM (diabetes mellitus)     HTN (hypertension)     Hypothyroid

## 2023-06-10 NOTE — ED PROVIDER NOTE - CLINICAL SUMMARY MEDICAL DECISION MAKING FREE TEXT BOX
58-year-old female with cough and sinus pressure and some shortness of breath for the past few weeks.  Worse when dusting her home.  Also has noted some trace ankle edema on some occasions.  No chest pain.  No difficulty laying flat.  Will obtain labs and chest x-ray and viral panel.  Work-up reviewed.  Will give Augmentin for sinusitis.

## 2023-07-11 ENCOUNTER — APPOINTMENT (OUTPATIENT)
Dept: GYNECOLOGIC ONCOLOGY | Facility: CLINIC | Age: 58
End: 2023-07-11

## 2023-07-11 ENCOUNTER — NON-APPOINTMENT (OUTPATIENT)
Age: 58
End: 2023-07-11

## 2023-11-10 ENCOUNTER — APPOINTMENT (OUTPATIENT)
Dept: NEPHROLOGY | Facility: CLINIC | Age: 58
End: 2023-11-10
Payer: MEDICAID

## 2023-11-10 VITALS
HEART RATE: 57 BPM | OXYGEN SATURATION: 98 % | TEMPERATURE: 96.7 F | HEIGHT: 64 IN | DIASTOLIC BLOOD PRESSURE: 72 MMHG | SYSTOLIC BLOOD PRESSURE: 128 MMHG | WEIGHT: 158 LBS | BODY MASS INDEX: 26.98 KG/M2

## 2023-11-10 PROCEDURE — 99205 OFFICE O/P NEW HI 60 MIN: CPT

## 2023-11-10 RX ORDER — ATORVASTATIN CALCIUM 40 MG/1
40 TABLET, FILM COATED ORAL DAILY
Qty: 90 | Refills: 1 | Status: DISCONTINUED | COMMUNITY
Start: 2018-03-17 | End: 2023-11-10

## 2023-11-10 RX ORDER — ERGOCALCIFEROL 1.25 MG/1
1.25 MG CAPSULE, LIQUID FILLED ORAL
Qty: 4 | Refills: 0 | Status: DISCONTINUED | COMMUNITY
Start: 2019-01-18 | End: 2023-11-10

## 2023-11-10 RX ORDER — LISINOPRIL 10 MG/1
10 TABLET ORAL
Qty: 30 | Refills: 0 | Status: DISCONTINUED | COMMUNITY
Start: 2019-02-20 | End: 2023-11-10

## 2023-11-10 RX ORDER — LISINOPRIL 40 MG/1
40 TABLET ORAL DAILY
Qty: 1 | Refills: 0 | Status: ACTIVE | COMMUNITY
Start: 2023-11-10

## 2023-11-10 RX ORDER — LISINOPRIL 20 MG/1
20 TABLET ORAL
Qty: 30 | Refills: 0 | Status: DISCONTINUED | COMMUNITY
Start: 2019-03-19 | End: 2023-11-10

## 2023-11-10 RX ORDER — PEN NEEDLE, DIABETIC 29 G X1/2"
32G X 4 MM NEEDLE, DISPOSABLE MISCELLANEOUS
Qty: 100 | Refills: 0 | Status: DISCONTINUED | COMMUNITY
Start: 2018-03-17 | End: 2023-11-10

## 2023-11-10 RX ORDER — AMLODIPINE BESYLATE 5 MG/1
5 TABLET ORAL
Qty: 30 | Refills: 0 | Status: DISCONTINUED | COMMUNITY
Start: 2018-11-13 | End: 2023-11-10

## 2023-11-10 RX ORDER — METOPROLOL TARTRATE 25 MG/1
25 TABLET, FILM COATED ORAL TWICE DAILY
Qty: 60 | Refills: 0 | Status: ACTIVE | COMMUNITY
Start: 2023-11-10

## 2023-11-10 RX ORDER — ATORVASTATIN CALCIUM 80 MG/1
80 TABLET, FILM COATED ORAL
Qty: 30 | Refills: 0 | Status: DISCONTINUED | COMMUNITY
Start: 2019-01-15 | End: 2023-11-10

## 2023-11-10 RX ORDER — BLOOD SUGAR DIAGNOSTIC
STRIP MISCELLANEOUS
Qty: 100 | Refills: 0 | Status: DISCONTINUED | COMMUNITY
Start: 2019-03-19 | End: 2023-11-10

## 2023-11-10 RX ORDER — ROSUVASTATIN CALCIUM 40 MG/1
40 TABLET, FILM COATED ORAL DAILY
Qty: 30 | Refills: 0 | Status: ACTIVE | COMMUNITY
Start: 2023-11-10

## 2023-11-10 RX ORDER — CLOPIDOGREL BISULFATE 75 MG/1
75 TABLET, FILM COATED ORAL
Qty: 30 | Refills: 3 | Status: DISCONTINUED | COMMUNITY
Start: 2018-03-17 | End: 2023-11-10

## 2023-11-10 RX ORDER — AMLODIPINE BESYLATE 10 MG/1
10 TABLET ORAL
Qty: 90 | Refills: 3 | Status: ACTIVE | COMMUNITY
Start: 2023-11-10

## 2023-11-10 RX ORDER — METOPROLOL TARTRATE 100 MG/1
100 TABLET, FILM COATED ORAL
Qty: 60 | Refills: 0 | Status: DISCONTINUED | COMMUNITY
Start: 2019-01-15 | End: 2023-11-10

## 2023-11-10 RX ORDER — DAPAGLIFLOZIN 10 MG/1
10 TABLET, FILM COATED ORAL DAILY
Qty: 30 | Refills: 5 | Status: ACTIVE | COMMUNITY
Start: 2023-11-10

## 2023-11-10 RX ORDER — BLOOD-GLUCOSE METER
W/DEVICE KIT MISCELLANEOUS
Qty: 1 | Refills: 0 | Status: DISCONTINUED | COMMUNITY
Start: 2018-03-17 | End: 2023-11-10

## 2023-11-10 RX ORDER — FERROUS SULFATE TAB EC 325 MG (65 MG FE EQUIVALENT) 325 (65 FE) MG
325 (65 FE) TABLET DELAYED RESPONSE ORAL
Qty: 60 | Refills: 0 | Status: DISCONTINUED | COMMUNITY
Start: 2019-05-07 | End: 2023-11-10

## 2023-11-10 RX ORDER — LANCETS 33 GAUGE
EACH MISCELLANEOUS
Qty: 100 | Refills: 0 | Status: DISCONTINUED | COMMUNITY
Start: 2018-03-17 | End: 2023-11-10

## 2023-11-13 NOTE — PROGRESS NOTE ADULT - NSHPATTENDINGPLANDISCUSS_GEN_ALL_CORE
11/13 AV- SPOKE WITH PATIENT, COORDINATED  FROM [facility]  
patient, , team
pt. , Dr. Wood (left voicemail), and Staff,

## 2024-04-08 NOTE — H&P ADULT - NSHPOUTPATIENTPROVIDERS_GEN_ALL_CORE
HCC coding opportunities          Chart Reviewed number of suggestions sent to Provider: 2     Patients Insurance     Medicare Insurance: Medicare        E11.51  E11.36     Jf

## 2024-05-03 DIAGNOSIS — R80.9 PROTEINURIA, UNSPECIFIED: ICD-10-CM

## 2024-05-13 PROBLEM — D06.9 CIN III WITH SEVERE DYSPLASIA: Status: ACTIVE | Noted: 2020-12-07

## 2024-05-13 NOTE — ASSESSMENT
[FreeTextEntry1] : 57 y/o with h/o HSIL s/p CKC 12/2020, poor compliance with followup, benign exam.

## 2024-05-13 NOTE — HISTORY OF PRESENT ILLNESS
[FreeTextEntry1] : Ms. Javier is a   referred by Dr. Yanira Beltran, for high grade cervical dysplasia.\par She is postmenopausal, LMP 2017. \par \par She reports that she went for her first GYN exam in a long time, many years per patient, and denies any prior h/o abnormal PAPs or cervical procedures. \par \par Pap- 10/26/2020- HSIL, with glandular component\par \par Colposcopy- 2020- \par   Cervix at 9- at least HSIL; CIN3\par   ECC- benign\par   EMB- negative\par \par 2020 [facility] biopsy:  \par -  High grade squamous intraepithelial lesion (moderate-severe dysplasia, CATALINO II-III) with focal endocervical gland involvement.  Margins clear.  \par -  ECC:  Benign endocervical tissue and epithelium.\par \par She had two postop visits in 2021 and did not return for her scheduled followup in 2021. 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 \par FH: no cancer history\par \par She denies any vaginal bleeding, postcoital spotting, abnormal discharge, pelvic pain or any other associated signs or symptoms. \par \par Health Maintenance:\par Mammogram: 22 BIRADS1\par Colonoscopy: many years ago per patient; aware due\par PAP:  as above\par \par Referred by Dr. Beltran (GYN)\par PCP: [facility]- Dr. Taj Thayer\par Cardiologist: Dr. Viveros ([facility])

## 2024-05-13 NOTE — DISCUSSION/SUMMARY
[FreeTextEntry1] : - f/u PAP/HPV; instructions reviewed -  The risk of recurrent dysplasia, signs and symptoms and surveillance plan were reviewed. -  HM reviewed.  -  She was advised to likely see me in 6 months, pending results. -  All questions were answered to her apparent satisfaction.

## 2024-05-13 NOTE — PHYSICAL EXAM
[de-identified] : no liesions or nodules [de-identified] : b/l adnexa nonpalpable on bimanual exam. [Chaperone Present] : A chaperone was present in the examining room during all aspects of the physical examination [Normal] : Anus and perineum: Normal sphincter tone, no masses, no prolapse. [Fully active, able to carry on all pre-disease performance without restriction] : Status 0 - Fully active, able to carry on all pre-disease performance without restriction

## 2024-05-13 NOTE — PROCEDURE
[SCJ Fully Visualized] : the squamocolumnar junction was not fully visualized [de-identified] : epithelialized external os was again noted; incised slightly with an #11 blade to permit passage of endocervical brush.  [Colposcopy] : colposcopy [Cervical Dysplasia] : cervical dysplasia [Patient] : the patient [Verbal Consent] : verbal consent was obtained prior to the procedure and is detailed in the patient's record [Site Verification] : site verification performed. [Time Out] : Time Out Procedure:The following was confirmed prior to the procedure-Correct patient identity, correct site, agreement on the procedure to be done and correct patient position. [Cervical Pap Performed] : a cervical pap smear was performed [No Abnormalities] : no abnormalities seen [Silver Nitrate] : silver nitrate [No Complications] : none [Tolerated Well] : the patient tolerated the procedure well [Post procedure instructions and information given] : post procedure instructions and information given and reviewed with patient. [1] : 1

## 2024-05-13 NOTE — LETTER BODY
[FreeTextEntry2] : PAP was negative this visit. \par She will see me in 6 months for followup and will continue to see you for routine GYN care. I will be performing her PAP smears. I will keep you updated on her progress. Thank you again for referring this antione patient. [FreeTextEntry1] : pap/hpv

## 2024-05-14 ENCOUNTER — APPOINTMENT (OUTPATIENT)
Dept: GYNECOLOGIC ONCOLOGY | Facility: CLINIC | Age: 59
End: 2024-05-14
Payer: MEDICAID

## 2024-05-14 VITALS
HEART RATE: 56 BPM | BODY MASS INDEX: 26.46 KG/M2 | WEIGHT: 155 LBS | SYSTOLIC BLOOD PRESSURE: 125 MMHG | DIASTOLIC BLOOD PRESSURE: 77 MMHG | HEIGHT: 64 IN

## 2024-05-14 DIAGNOSIS — R10.2 PELVIC AND PERINEAL PAIN: ICD-10-CM

## 2024-05-14 DIAGNOSIS — R93.89 ABNORMAL FINDINGS ON DIAGNOSTIC IMAGING OF OTHER SPECIFIED BODY STRUCTURES: ICD-10-CM

## 2024-05-14 DIAGNOSIS — N88.2 STRICTURE AND STENOSIS OF CERVIX UTERI: ICD-10-CM

## 2024-05-14 DIAGNOSIS — D06.9 CARCINOMA IN SITU OF CERVIX, UNSPECIFIED: ICD-10-CM

## 2024-05-14 PROCEDURE — 99213 OFFICE O/P EST LOW 20 MIN: CPT | Mod: 25

## 2024-05-14 PROCEDURE — 99459 PELVIC EXAMINATION: CPT

## 2024-05-14 PROCEDURE — 57452 EXAM OF CERVIX W/SCOPE: CPT

## 2024-05-18 ENCOUNTER — APPOINTMENT (OUTPATIENT)
Dept: ULTRASOUND IMAGING | Facility: CLINIC | Age: 59
End: 2024-05-18
Payer: MEDICAID

## 2024-05-18 ENCOUNTER — OUTPATIENT (OUTPATIENT)
Dept: OUTPATIENT SERVICES | Facility: HOSPITAL | Age: 59
LOS: 1 days | End: 2024-05-18
Payer: MEDICAID

## 2024-05-18 ENCOUNTER — RESULT REVIEW (OUTPATIENT)
Age: 59
End: 2024-05-18

## 2024-05-18 ENCOUNTER — APPOINTMENT (OUTPATIENT)
Dept: MAMMOGRAPHY | Facility: CLINIC | Age: 59
End: 2024-05-18
Payer: MEDICAID

## 2024-05-18 DIAGNOSIS — N88.2 STRICTURE AND STENOSIS OF CERVIX UTERI: ICD-10-CM

## 2024-05-18 DIAGNOSIS — Z95.1 PRESENCE OF AORTOCORONARY BYPASS GRAFT: Chronic | ICD-10-CM

## 2024-05-18 DIAGNOSIS — R10.2 PELVIC AND PERINEAL PAIN: ICD-10-CM

## 2024-05-18 DIAGNOSIS — Z00.8 ENCOUNTER FOR OTHER GENERAL EXAMINATION: ICD-10-CM

## 2024-05-18 DIAGNOSIS — Z00.00 ENCOUNTER FOR GENERAL ADULT MEDICAL EXAMINATION WITHOUT ABNORMAL FINDINGS: ICD-10-CM

## 2024-05-18 DIAGNOSIS — D06.9 CARCINOMA IN SITU OF CERVIX, UNSPECIFIED: ICD-10-CM

## 2024-05-18 PROCEDURE — 77067 SCR MAMMO BI INCL CAD: CPT | Mod: 26

## 2024-05-18 PROCEDURE — 76830 TRANSVAGINAL US NON-OB: CPT

## 2024-05-18 PROCEDURE — 77067 SCR MAMMO BI INCL CAD: CPT

## 2024-05-18 PROCEDURE — 77063 BREAST TOMOSYNTHESIS BI: CPT | Mod: 26

## 2024-05-18 PROCEDURE — 76856 US EXAM PELVIC COMPLETE: CPT

## 2024-05-18 PROCEDURE — 76830 TRANSVAGINAL US NON-OB: CPT | Mod: 26

## 2024-05-18 PROCEDURE — 77063 BREAST TOMOSYNTHESIS BI: CPT

## 2024-05-18 PROCEDURE — 76856 US EXAM PELVIC COMPLETE: CPT | Mod: 26

## 2024-05-29 ENCOUNTER — LABORATORY RESULT (OUTPATIENT)
Age: 59
End: 2024-05-29

## 2024-05-29 PROBLEM — R93.89 THICKENED ENDOMETRIUM: Status: ACTIVE | Noted: 2024-05-29

## 2024-05-29 LAB
CYTOLOGY CVX/VAG DOC THIN PREP: NORMAL
HPV HIGH+LOW RISK DNA PNL CVX: NOT DETECTED

## 2024-05-29 NOTE — PROCEDURE
[SCJ Fully Visualized] : the squamocolumnar junction was not fully visualized [Biopsies Taken: # ___] : no biopsies were taken of the cervix [ECC Done] : an endocervical curettage was not performed [de-identified] : epithelialized external os was again noted; incised slightly with an #11 blade to permit passage of endocervical brush.

## 2024-05-29 NOTE — LETTER BODY
[Dear  ___] : Dear  [unfilled], [I recently saw our patient [unfilled] for a follow-up visit.] : I recently saw our patient, [unfilled] for a follow-up visit. [Attached please find my note.] : Attached please find my note. [FreeTextEntry2] : PAP was negative this visit.  She will see me in 6 months for followup and will continue to see you for routine GYN care. I will be performing her PAP smears. I will keep you updated on her progress. Thank you again for referring this antione patient. [FreeTextEntry1] : pap/hpv, sono, mammo

## 2024-05-29 NOTE — ASSESSMENT
[FreeTextEntry1] : 60 y/o with h/o HSIL s/p CKC 12/2020, poor compliance with followup, benign exam.

## 2024-05-29 NOTE — HISTORY OF PRESENT ILLNESS
[FreeTextEntry1] : Ms. Javier is a   referred by Dr. Yanira Beltran, for high grade cervical dysplasia. She is postmenopausal, LMP 2017.   She reports that she went for her first GYN exam in a long time, many years per patient, and denies any prior h/o abnormal PAPs or cervical procedures.   Pap- 10/26/2020- HSIL, with glandular component  Colposcopy- 2020-    Cervix at 8- at least HSIL; CIN3   ECC- benign   EMB- negative  2020 [facility] biopsy:   -  High grade squamous intraepithelial lesion (moderate-severe dysplasia, CATALINO II-III) with focal endocervical gland involvement.  Margins clear.   -  ECC:  Benign endocervical tissue and epithelium.  She had two postop visits in 2021 and did not return for her scheduled followup in 2021.  In 2021, PAP was negative/HRHPV (-). 2022- Pap/HRHPV (-) She has missed appts for two years.   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  FH: no cancer history  She had a few episodes of pelvic discomfort viral past few weeks that resolved. She denies any vaginal bleeding, postcoital spotting, abnormal discharge or any other associated signs or symptoms.   Health Maintenance: Mammogram: 22 BIRADS1; ordered today Colonoscopy: many years ago per patient; aware due  Referred by Dr. Beltran (GYN) PCP: [facility]- Dr. Taj Thayer Cardiologist: Dr. Viveros ([facility])

## 2024-05-29 NOTE — DISCUSSION/SUMMARY
[FreeTextEntry1] : - f/u PAP/HPV; instructions reviewed - pelvic sono ordered to evaluate her intermittent pelvic discomfort --  The risk of recurrent dysplasia, signs and symptoms and surveillance plan were reviewed. -  HM reviewed. Mammogram ordered. -  She was advised to likely see me in 6 months, pending results. -  All questions were answered to her apparent satisfaction.  addendum: PAP negative/HRHPV (-); mammogram BIRADS1; Pelvic sono: 5/18/24 Uterus: 5.9 cm x 4.1 cm x 5.9 cm. fibroids measuring up to 1.2 cm, decreased in size. Endometrium: 9 mm. thickened with cystic changes Trace fluid in the cavity. Right ovary: 3.4 cm x 1.9 cm x 2.2 cm. Within normal limits. Left ovary: 3.2 cm x 2.1 cm x 2.8 cm. heterogeneous with calcifications. Fluid: None. IMPRESSION: Overall decreased size of fibroids. 9 mm thickened endometrium with cystic changes. Follow-up sonohysterography versus tissue sampling is advised.  I reveiwed these results with Amber by phone 5/29/24 and recommended EUA with cervical dilation, HSC/D&C; diff dx discussed, including benign polyp, precancer, cancer; also discussed heterogenous ovary; she declined to have any procedure during viral summer and wishes to defer to fall; I therefore recommended close sonographic f/u in 2 months and she agreed to schedule it (order placed). If she cahnges her mind and wants the surgery she will call . All questions were answered. LYN

## 2024-05-29 NOTE — PHYSICAL EXAM
[80176] : A chaperone was present during the pelvic exam. [FreeTextEntry2] : Perri [de-identified] : no liesions or nodules [de-identified] : b/l adnexa nonpalpable on bimanual exam.

## 2024-05-31 LAB
ALBUMIN SERPL ELPH-MCNC: 4.9 G/DL
ANION GAP SERPL CALC-SCNC: 14 MMOL/L
APPEARANCE: CLEAR
BACTERIA: NEGATIVE /HPF
BASOPHILS # BLD AUTO: 0.07 K/UL
BASOPHILS NFR BLD AUTO: 0.5 %
BILIRUBIN URINE: NEGATIVE
BLOOD URINE: ABNORMAL
BUN SERPL-MCNC: 28 MG/DL
CALCIUM SERPL-MCNC: 10.5 MG/DL
CAST: 0 /LPF
CHLORIDE SERPL-SCNC: 101 MMOL/L
CO2 SERPL-SCNC: 25 MMOL/L
COLOR: YELLOW
CREAT SERPL-MCNC: 1.13 MG/DL
CREAT SPEC-SCNC: 75 MG/DL
CREAT/PROT UR: 0.3 RATIO
EGFR: 56 ML/MIN/1.73M2
EOSINOPHIL # BLD AUTO: 0.36 K/UL
EOSINOPHIL NFR BLD AUTO: 2.5 %
EPITHELIAL CELLS: 5 /HPF
GLUCOSE QUALITATIVE U: >=1000 MG/DL
GLUCOSE SERPL-MCNC: 175 MG/DL
HCT VFR BLD CALC: 43.9 %
HGB BLD-MCNC: 13.8 G/DL
IMM GRANULOCYTES NFR BLD AUTO: 0.2 %
KETONES URINE: NEGATIVE MG/DL
LEUKOCYTE ESTERASE URINE: NEGATIVE
LYMPHOCYTES # BLD AUTO: 8.64 K/UL
LYMPHOCYTES NFR BLD AUTO: 60.1 %
MAN DIFF?: NORMAL
MCHC RBC-ENTMCNC: 27.3 PG
MCHC RBC-ENTMCNC: 31.4 GM/DL
MCV RBC AUTO: 86.9 FL
MICROSCOPIC-UA: NORMAL
MONOCYTES # BLD AUTO: 0.68 K/UL
MONOCYTES NFR BLD AUTO: 4.7 %
NEUTROPHILS # BLD AUTO: 4.59 K/UL
NEUTROPHILS NFR BLD AUTO: 32 %
NITRITE URINE: NEGATIVE
PH URINE: 6
PHOSPHATE SERPL-MCNC: 4.3 MG/DL
PLATELET # BLD AUTO: 259 K/UL
POTASSIUM SERPL-SCNC: 5 MMOL/L
PROT UR-MCNC: 23 MG/DL
PROTEIN URINE: 30 MG/DL
RBC # BLD: 5.05 M/UL
RBC # FLD: 15.7 %
RED BLOOD CELLS URINE: 2 /HPF
REVIEW: NORMAL
SODIUM SERPL-SCNC: 140 MMOL/L
SPECIFIC GRAVITY URINE: 1.03
UROBILINOGEN URINE: 0.2 MG/DL
WBC # FLD AUTO: 14.37 K/UL
WHITE BLOOD CELLS URINE: 1 /HPF

## 2024-07-22 ENCOUNTER — APPOINTMENT (OUTPATIENT)
Dept: ULTRASOUND IMAGING | Facility: CLINIC | Age: 59
End: 2024-07-22
Payer: MEDICAID

## 2024-07-22 PROCEDURE — 76830 TRANSVAGINAL US NON-OB: CPT | Mod: 26

## 2024-07-22 PROCEDURE — 76856 US EXAM PELVIC COMPLETE: CPT | Mod: 26

## 2024-07-31 ENCOUNTER — APPOINTMENT (OUTPATIENT)
Dept: NEPHROLOGY | Facility: CLINIC | Age: 59
End: 2024-07-31
Payer: MEDICAID

## 2024-07-31 VITALS
SYSTOLIC BLOOD PRESSURE: 116 MMHG | HEART RATE: 62 BPM | OXYGEN SATURATION: 97 % | WEIGHT: 160 LBS | BODY MASS INDEX: 27.31 KG/M2 | HEIGHT: 64 IN | TEMPERATURE: 95.4 F | DIASTOLIC BLOOD PRESSURE: 62 MMHG

## 2024-07-31 DIAGNOSIS — R80.9 PROTEINURIA, UNSPECIFIED: ICD-10-CM

## 2024-07-31 PROCEDURE — 99214 OFFICE O/P EST MOD 30 MIN: CPT

## 2024-07-31 NOTE — ASSESSMENT
[FreeTextEntry1] : 57 yo woman with DM, HTN, CAD/CABG x 4 and CATALINO stage III. Noted for proteinuria with Urine prot/creat of 0.3 ( 4/10/2023) and well preserved renal function, most likely due to DM nephropathy. Pt is on appropriate rx with ACEI and now addition of SGLT2 inhibitor. Explained to pt re : importance of strict blood glucose and pressure control and follow up with GYN. Will reevaluate with repeat labs and renal USG in 6 months. Consider adding Kerendia at next visit.  --Renal function : stable.   BUN/CREAT :  24/0.87 ( 7/10/2024)  23/0.79 ( 3/29/2023)  --Proteinuria  : stable, continue  ACEI and SGLT2i.  Urine prot/creat : alb/creat , 256mg/g( 7/10/2024)  0.3 ( 4/10/2023)  --K : 5.2 : will hold adding MRA/Kerendia for now.

## 2024-07-31 NOTE — HISTORY OF PRESENT ILLNESS
[FreeTextEntry1] : Ms. Javier presents for follow up of proteinuria.  Pmhx of DM ( hx of gestational DM as well) and HTN x 15 years with some periods of poor control. S/p CABG x 4  3/2018 CATALINO stage III dx 11/2020 with [facility] biopsy with clear margins. Pt feels well overall without any recent events or changes. Notes recent rx of Farxiga by Cardiology.  Denies any new events or complaints since initial evaluation in 11/2023, Has been tolerating meds well.

## 2024-07-31 NOTE — CONSULT LETTER
[Courtesy Letter:] : I had the pleasure of seeing your patient, [unfilled], in my office today. [Please see my note below.] : Please see my note below. [Consult Closing:] : Thank you very much for allowing me to participate in the care of this patient.  If you have any questions, please do not hesitate to contact me. [Sincerely,] : Sincerely, [DrTerrence  ___] : Dr. GONCALVES

## 2024-07-31 NOTE — PHYSICAL EXAM
[General Appearance - Alert] : alert [Sclera] : the sclera and conjunctiva were normal [PERRL With Normal Accommodation] : pupils were equal in size, round, and reactive to light [Both Tympanic Membranes Were Examined] : both tympanic membranes were normal [Outer Ear] : the ears and nose were normal in appearance [Neck Appearance] : the appearance of the neck was normal [] : the neck was supple [Auscultation Breath Sounds / Voice Sounds] : lungs were clear to auscultation bilaterally [Heart Sounds] : normal S1 and S2 [Heart Rate And Rhythm] : heart rate was normal and rhythm regular [Edema] : there was no peripheral edema [Bowel Sounds] : normal bowel sounds [Abdomen Soft] : soft [Abdomen Tenderness] : non-tender

## 2024-08-20 ENCOUNTER — OUTPATIENT (OUTPATIENT)
Dept: OUTPATIENT SERVICES | Facility: HOSPITAL | Age: 59
LOS: 1 days | End: 2024-08-20

## 2024-08-20 VITALS
WEIGHT: 159.61 LBS | OXYGEN SATURATION: 97 % | RESPIRATION RATE: 16 BRPM | TEMPERATURE: 98 F | DIASTOLIC BLOOD PRESSURE: 79 MMHG | HEIGHT: 64 IN | SYSTOLIC BLOOD PRESSURE: 122 MMHG | HEART RATE: 50 BPM

## 2024-08-20 DIAGNOSIS — Z98.890 OTHER SPECIFIED POSTPROCEDURAL STATES: Chronic | ICD-10-CM

## 2024-08-20 DIAGNOSIS — I10 ESSENTIAL (PRIMARY) HYPERTENSION: ICD-10-CM

## 2024-08-20 DIAGNOSIS — G47.33 OBSTRUCTIVE SLEEP APNEA (ADULT) (PEDIATRIC): ICD-10-CM

## 2024-08-20 DIAGNOSIS — E11.9 TYPE 2 DIABETES MELLITUS WITHOUT COMPLICATIONS: ICD-10-CM

## 2024-08-20 DIAGNOSIS — E03.9 HYPOTHYROIDISM, UNSPECIFIED: ICD-10-CM

## 2024-08-20 DIAGNOSIS — R93.89 ABNORMAL FINDINGS ON DIAGNOSTIC IMAGING OF OTHER SPECIFIED BODY STRUCTURES: ICD-10-CM

## 2024-08-20 DIAGNOSIS — Z95.1 PRESENCE OF AORTOCORONARY BYPASS GRAFT: Chronic | ICD-10-CM

## 2024-08-20 RX ORDER — EZETIMIBE 10 MG/1
1 TABLET ORAL
Refills: 0 | DISCHARGE

## 2024-08-20 NOTE — H&P PST ADULT - LAST ECHOCARDIOGRAM
Dr Dillon July 2024 Dr Dillon July 2024, EF 45-50%, Abnormal septal wall motion due to postoperative CABG, Mild to Mod MR, Mild TR

## 2024-08-20 NOTE — H&P PST ADULT - PROBLEM SELECTOR PLAN 2
Instructed to take Amlodipine, Lisinopril, Metoprolol DOS  Check with Cardiologist about aspirin plan. Patient verbalized understanding. Instructed to take Amlodipine, Lisinopril, Metoprolol, HCTZ  DOS  Check with Cardiologist about aspirin plan. Patient verbalized understanding.

## 2024-08-20 NOTE — H&P PST ADULT - NSICDXPASTMEDICALHX_GEN_ALL_CORE_FT
PAST MEDICAL HISTORY:  Abnormal findings on diagnostic imaging of body structures     Abnormal Pap smear of cervix     CAD (coronary artery disease)     DM (diabetes mellitus)     HTN (hypertension)     Hyperlipidemia     Hypothyroid      PAST MEDICAL HISTORY:  Abnormal findings on diagnostic imaging of body structures     Abnormal Pap smear of cervix     CAD (coronary artery disease)     DM (diabetes mellitus)     H/O leukocytosis     History of lymphocytosis     HTN (hypertension)     Hyperlipidemia     Hypothyroid

## 2024-08-20 NOTE — H&P PST ADULT - ENDOCRINE COMMENTS
Type II DM, FS ranging from  mg/dl Type II DM, FS ranging from  mg/dl, maintained on meds Type II DM, FS ranging from  mg/dl, On Farxiga, Admelog, Basaglar, Hgba1c on 5/29/24 7.7

## 2024-08-20 NOTE — H&P PST ADULT - PROBLEM SELECTOR PLAN 1
Scheduled for Diagnostic hysteroscopy dilation and curettage on 8/28/24.  Preop instructions provided and pt verbalized understanding.  Famotidine provided with instructions. Scheduled for Diagnostic hysteroscopy dilation and curettage on 8/28/24.  Preop instructions provided and pt verbalized understanding.  Famotidine provided with instructions.  CBC result in HIE Scheduled for Diagnostic hysteroscopy dilation and curettage on 8/28/24.  Preop instructions provided and pt verbalized understanding.  Famotidine provided with instructions.  CBC result in HIE noted with elevated WBC.   In 2019 patient was seen by hematologist for leukocytosis and lymphocytosis.  As per pt prior to that she had bone marrow biopsy and hematologic workup. Patient did not want further followup. Expectant Surveillance was recommended. Note in HIE.

## 2024-08-20 NOTE — H&P PST ADULT - HISTORY OF PRESENT ILLNESS
59 yr old female with h/o CAD, CABG x 4 (2018), Hypothyroidism, HTN, Hyperlipidemia, Type II DM and preop dx of abnormal findings on diagnostic imaging presents to have [facility] eval for Diagnostic hysteroscopy dilation and curettage.    Patient  referred by Dr. Yanira Beltran, for high grade cervical dysplasia.  She is postmenopausal, LMP 2017.  ?

## 2024-08-20 NOTE — H&P PST ADULT - PROBLEM SELECTOR PLAN 3
Instructed to hold Humalog AM of surgery  Instructed to take Basaglar 19 units night before surgery. Last Hgba1c  FS on admit Instructed to hold Humalog AM of surgery. Instructed to hold Farxiga 3 days prior to surgery, Last dose 6/24/24  Instructed to take Basaglar 19 units night before surgery. Last Hgba1c  FS on admit Instructed to hold Admelog AM of surgery. Instructed to hold Farxiga 3 days prior to surgery, Last dose 6/24/24  Instructed to take Basaglar 19 units night before surgery. Last Hgba1c 7.7 on 5/29/24  FS on admit

## 2024-08-20 NOTE — H&P PST ADULT - MAMMOGRAM, LAST, PROFILE
[Menstruating] : menstruating [Normal Amount/Duration] : it was of a normal amount and duration [Regular Cycle Intervals] : have been regular 2024

## 2024-08-20 NOTE — H&P PST ADULT - PRO INTERPRETER NEED 2
NO KNOWN LATEX ALLERGY  medication verified per patient.  Weight taken with shoes on  Cough x 2 1/2 weeks, phlegm greenish, no nausea, vomiting, diarrhea, fever or chills.   98.0   English

## 2024-08-27 ENCOUNTER — TRANSCRIPTION ENCOUNTER (OUTPATIENT)
Age: 59
End: 2024-08-27

## 2024-08-27 NOTE — ASU PATIENT PROFILE, ADULT - FALL HARM RISK - UNIVERSAL INTERVENTIONS
Bed in lowest position, wheels locked, appropriate side rails in place/Call bell, personal items and telephone in reach/Instruct patient to call for assistance before getting out of bed or chair/Non-slip footwear when patient is out of bed/[place]ille to call system/Physically safe environment - no spills, clutter or unnecessary equipment/Purposeful Proactive Rounding/Room/bathroom lighting operational, light cord in reach

## 2024-08-27 NOTE — ASU PATIENT PROFILE, ADULT - NSICDXPASTMEDICALHX_GEN_ALL_CORE_FT
PAST MEDICAL HISTORY:  Abnormal findings on diagnostic imaging of body structures     Abnormal Pap smear of cervix     CAD (coronary artery disease)     DM (diabetes mellitus)     H/O leukocytosis     History of lymphocytosis     HTN (hypertension)     Hyperlipidemia     Hypothyroid

## 2024-08-28 ENCOUNTER — APPOINTMENT (OUTPATIENT)
Dept: GYNECOLOGIC ONCOLOGY | Facility: HOSPITAL | Age: 59
End: 2024-08-28

## 2024-08-28 ENCOUNTER — TRANSCRIPTION ENCOUNTER (OUTPATIENT)
Age: 59
End: 2024-08-28

## 2024-08-28 ENCOUNTER — OUTPATIENT (OUTPATIENT)
Dept: INPATIENT UNIT | Facility: HOSPITAL | Age: 59
LOS: 1 days | Discharge: HOME | End: 2024-08-28
Payer: MEDICAID

## 2024-08-28 VITALS
DIASTOLIC BLOOD PRESSURE: 70 MMHG | OXYGEN SATURATION: 99 % | SYSTOLIC BLOOD PRESSURE: 130 MMHG | TEMPERATURE: 98 F | HEART RATE: 76 BPM | RESPIRATION RATE: 12 BRPM

## 2024-08-28 VITALS
SYSTOLIC BLOOD PRESSURE: 114 MMHG | OXYGEN SATURATION: 97 % | TEMPERATURE: 98 F | HEIGHT: 64 IN | RESPIRATION RATE: 15 BRPM | DIASTOLIC BLOOD PRESSURE: 59 MMHG | WEIGHT: 159.61 LBS

## 2024-08-28 DIAGNOSIS — Z98.890 OTHER SPECIFIED POSTPROCEDURAL STATES: Chronic | ICD-10-CM

## 2024-08-28 DIAGNOSIS — R93.89 ABNORMAL FINDINGS ON DIAGNOSTIC IMAGING OF OTHER SPECIFIED BODY STRUCTURES: ICD-10-CM

## 2024-08-28 DIAGNOSIS — Z95.1 PRESENCE OF AORTOCORONARY BYPASS GRAFT: Chronic | ICD-10-CM

## 2024-08-28 LAB — GLUCOSE BLDC GLUCOMTR-MCNC: 143 MG/DL — HIGH (ref 70–99)

## 2024-08-28 PROCEDURE — 58555 HYSTEROSCOPY DX SEP PROC: CPT

## 2024-08-28 RX ORDER — AMLODIPINE BESYLATE 10 MG/1
1 TABLET ORAL
Refills: 0 | DISCHARGE

## 2024-08-28 RX ORDER — LISINOPRIL 10 MG/1
1 TABLET ORAL
Refills: 0 | DISCHARGE

## 2024-08-28 RX ORDER — METOPROLOL TARTRATE 100 MG/1
1.5 TABLET ORAL
Refills: 0 | DISCHARGE

## 2024-08-28 RX ORDER — ROSUVASTATIN CALCIUM 10 MG/1
1 TABLET ORAL
Refills: 0 | DISCHARGE

## 2024-08-28 RX ORDER — DAPAGLIFLOZIN 10 MG/1
1 TABLET, FILM COATED ORAL
Refills: 0 | DISCHARGE

## 2024-08-28 RX ORDER — ACETAMINOPHEN 325 MG/1
3 TABLET ORAL
Qty: 0 | Refills: 0 | DISCHARGE

## 2024-08-28 RX ORDER — ASPIRIN 81 MG
1 TABLET, DELAYED RELEASE (ENTERIC COATED) ORAL
Refills: 0 | DISCHARGE

## 2024-08-28 RX ORDER — HYDROCHLOROTHIAZIDE 12.5 MG/1
1 CAPSULE ORAL
Refills: 0 | DISCHARGE

## 2024-08-28 RX ORDER — INSULIN GLARGINE 100 [IU]/ML
24 INJECTION, SOLUTION SUBCUTANEOUS
Refills: 0 | DISCHARGE

## 2024-08-28 RX ORDER — LEVOTHYROXINE SODIUM 100 MCG
1 TABLET ORAL
Refills: 0 | DISCHARGE

## 2024-08-28 RX ORDER — FENOFIBRATE 145 MG/1
1 TABLET, FILM COATED ORAL
Refills: 0 | DISCHARGE

## 2024-08-28 RX ORDER — IBUPROFEN 600 MG
1 TABLET ORAL
Qty: 0 | Refills: 0 | DISCHARGE

## 2024-08-28 NOTE — BRIEF OPERATIVE NOTE - NSICDXBRIEFPREOP_GEN_ALL_CORE_FT
PRE-OP DIAGNOSIS:  Right ovarian cyst 28-Aug-2024 16:59:26  Felicia Calabrese   PRE-OP DIAGNOSIS:  Thickened endometrium 28-Aug-2024 17:02:00  Felicia Calabrese

## 2024-08-28 NOTE — ASU DISCHARGE PLAN (ADULT/PEDIATRIC) - CARE PROVIDER_API CALL
Yanira Noel  Gynecologic Oncology  [address] [place] [address]  Phone: [phone]  Fax: [phone]  Scheduled Appointment: 09/17/2024 10:15 AM

## 2024-08-28 NOTE — ASU DISCHARGE PLAN (ADULT/PEDIATRIC) - NS MD DC FALL RISK RISK
For information on Fall & Injury Prevention, visit: [email or web address] OR  [email or web address] OR  https://www.cdc.gov/steadi/patient.html

## 2024-08-28 NOTE — ASU DISCHARGE PLAN (ADULT/PEDIATRIC) - NURSING INSTRUCTIONS
ALTERNATE TYLENOL AND MOTRIN EVERY 3 HOURS MAKING SURE THAT EACH DOSE OF TYLENOL IS 6 HRS FROM PREVIOUS TYLENOL DOSE AND EACH DOSE OF MOTRIN IS 6 HOURS FROM PREVIOUS MOTRIN DOSE.  THE FIRST DOSE OF MOTRIN/IBUPROFEN CAN BE WHEN NEEDED AND FIRST DOSE OF TYLENOL/ACETAMINOPHEN INCLUDING PERCOCET/VICODIN AND COLD MEDICINE CAN BE NO EARLIER THAN _10:15 PM__. THE MAXIMIUM AMOUNT OF DAILY TYLENOL IS 4000MG. PLEASE KEEP THAT IN MIND.

## 2024-08-28 NOTE — BRIEF OPERATIVE NOTE - OPERATION/FINDINGS
Normal external genitalia. 6 week mobile uterus on manual exam. Epithelialized external cervical os. Obliterated endocervical canal.  Normal external genitalia. mobile uterus on manual exam. Epithelialized external cervical os. Obliterated endocervical canal.

## 2024-08-28 NOTE — ASU DISCHARGE PLAN (ADULT/PEDIATRIC) - ASU DC SPECIAL INSTRUCTIONSFT
Alternate Tylenol 975mg q6 hrs and Motrin 600mg q6hrs. Vaginal spotting for the next couple of days is normal.  If heavy vaginal bleeding, foul smelling discharge, fevers, or pain not controlled with oral pain meds, please contact your provider or go to the emergency room.  Nothing in the vagina for the next two weeks. Please follow up with Dr. Noel in office for a 2 week follow-up on 9/17 @10:15AM.

## 2024-08-28 NOTE — BRIEF OPERATIVE NOTE - FIRST ASSIST
Resident/Fellow Topical Steroids Applications Pregnancy And Lactation Text: Most topical steroids are considered safe to use during pregnancy and lactation.  Any topical steroid applied to the breast or nipple should be washed off before breastfeeding.

## 2024-09-03 ENCOUNTER — NON-APPOINTMENT (OUTPATIENT)
Age: 59
End: 2024-09-03

## 2024-09-16 ENCOUNTER — NON-APPOINTMENT (OUTPATIENT)
Age: 59
End: 2024-09-16

## 2024-09-17 ENCOUNTER — APPOINTMENT (OUTPATIENT)
Dept: GYNECOLOGIC ONCOLOGY | Facility: CLINIC | Age: 59
End: 2024-09-17
Payer: MEDICAID

## 2024-09-17 VITALS
DIASTOLIC BLOOD PRESSURE: 78 MMHG | RESPIRATION RATE: 14 BRPM | SYSTOLIC BLOOD PRESSURE: 128 MMHG | HEART RATE: 54 BPM | OXYGEN SATURATION: 98 %

## 2024-09-17 DIAGNOSIS — R10.2 PELVIC AND PERINEAL PAIN: ICD-10-CM

## 2024-09-17 DIAGNOSIS — R93.89 ABNORMAL FINDINGS ON DIAGNOSTIC IMAGING OF OTHER SPECIFIED BODY STRUCTURES: ICD-10-CM

## 2024-09-17 DIAGNOSIS — N88.2 STRICTURE AND STENOSIS OF CERVIX UTERI: ICD-10-CM

## 2024-09-17 DIAGNOSIS — D06.9 CARCINOMA IN SITU OF CERVIX, UNSPECIFIED: ICD-10-CM

## 2024-09-17 PROBLEM — E78.5 HYPERLIPIDEMIA, UNSPECIFIED: Chronic | Status: ACTIVE | Noted: 2024-08-20

## 2024-09-17 PROBLEM — Z86.2 PERSONAL HISTORY OF DISEASES OF THE BLOOD AND BLOOD-FORMING ORGANS AND CERTAIN DISORDERS INVOLVING THE IMMUNE MECHANISM: Chronic | Status: ACTIVE | Noted: 2024-08-20

## 2024-09-17 PROCEDURE — 99212 OFFICE O/P EST SF 10 MIN: CPT

## 2024-09-17 NOTE — ASSESSMENT
[FreeTextEntry1] : 58y/o s/p attempted HSC/D&C with complete cervical canal obliteration precluing access.   PLAN: - pelvic MRI ordered Surgical findings were again reviewed in detail with the patient . Despite a normal-appearing ectocervix and external os, there was unexpected complete obliteration of the endocervical canal at 1cm.  Instructions were reviewed. I explained that a hysterectomy is viral only way to remove the endometrium fo rpathologic evaluation and viral diff dx was again discussed- benign polyp, hyperplasia/precancer, cancer.  She agreed to have viral MRI (to evaluate for occult cervical mass and for suspicion level of endometrium) and then we will have a telehealth discussion re results and surgery. SHe is quite reluctant to have further surgery, is concerned about hyperglycemia she had following procedure- I advised her to discuss this with her PCP asap.   All questions were answered to her apparent satisfaction.

## 2024-09-17 NOTE — REASON FOR VISIT
[Post Op] : post op visit [de-identified] : 8/28/24 [de-identified] : diagnostic hysteroscopy, attempted endometrial cavity access failed. [de-identified] : She reports that she has had a smooth recovery with no issues. She denies abnl vaginal bleeding, pelvic or abdominal pain or urinary or bowel complaints. No longer requiring pain medication. Returning to usual activities and maintaining pelvic rest.

## 2024-09-17 NOTE — DISCUSSION/SUMMARY
[Doing Well] : is doing well [Excellent Pain Control] : has excellent pain control [No Sign of Infection] : is showing no signs of infection [0] : 0 [Firm] : soft [Tender] : nontender [External Genitalia Abnormal] : normal external genitalia [Vaginal Exam Abnormal] : normal vaginal exam [de-identified] : HUI healing well. There is no patent cerix.  [de-identified] : normal [de-identified] : no restrictions [FreeTextEntry1] : No pathology specimens were collected at procedure.

## 2024-10-03 ENCOUNTER — APPOINTMENT (OUTPATIENT)
Dept: MRI IMAGING | Facility: CLINIC | Age: 59
End: 2024-10-03

## 2024-10-03 PROCEDURE — 72197 MRI PELVIS W/O & W/DYE: CPT | Mod: 26

## 2024-10-14 ENCOUNTER — APPOINTMENT (OUTPATIENT)
Dept: GYNECOLOGIC ONCOLOGY | Facility: CLINIC | Age: 59
End: 2024-10-14
Payer: MEDICAID

## 2024-10-14 DIAGNOSIS — N83.201 UNSPECIFIED OVARIAN CYST, RIGHT SIDE: ICD-10-CM

## 2024-10-14 DIAGNOSIS — R93.89 ABNORMAL FINDINGS ON DIAGNOSTIC IMAGING OF OTHER SPECIFIED BODY STRUCTURES: ICD-10-CM

## 2024-10-14 DIAGNOSIS — N83.202 UNSPECIFIED OVARIAN CYST, RIGHT SIDE: ICD-10-CM

## 2024-10-14 DIAGNOSIS — D06.9 CARCINOMA IN SITU OF CERVIX, UNSPECIFIED: ICD-10-CM

## 2024-10-14 DIAGNOSIS — N88.2 STRICTURE AND STENOSIS OF CERVIX UTERI: ICD-10-CM

## 2024-10-14 PROCEDURE — 99212 OFFICE O/P EST SF 10 MIN: CPT

## 2024-10-21 NOTE — H&P PST ADULT - NS PRO FEM REPRO PAP RESULTS
Your Care Transitions Nurse is Liss Engel RN and can be directly reached at [phone].      MEDICATIONS:  See Medication List (bring to your doctor appointments).    VACCINES:  Most Recent Immunizations   Administered Date(s) Administered    COVID Pfizer 12Y+ (Requires Dilution) 05/27/2021    Influenza, split virus, trivalent 10/01/2012    Influenza, split virus, trivalent, PF 10/01/2012    Pneumococcal Polysaccharide PPV23 11/30/2016    Pneumococcal conjugate PCV20 03/30/2023    Respiratory Syncytial Virus, bivalent 03/05/2024    Tdap 06/03/2013    Zoster recombinant 08/19/2023       ACTIVITY:  Weigh yourself daily (first thing in the morning, with same amount of clothes on) unless told otherwise by your doctor.  Continue activity as you were in the hospital, slowly increase to what you were doing previously.  Up as desired / no restrictions.    SMOKING:  Avoid all tobacco products and secondhand smoke.  Smoking Cessation Counseling offered.  [place] Toll Free Quit Line: [phone]    DIET:  Limit salt and salty foods unless told otherwise by your doctor.  No Restrictions    Trouble breathing or chest pain - CALL 911    CONTACT YOUR DOCTOR IF:  You have symptoms that are not \"normal\" for you.  You have new or worse symptoms or pain, not relieved by medicine or rest.  Temperature greater than 101 degrees F, chills or flu like symptoms.  You gain more than 3 pounds in 2 days.  Increased swelling, redness or drainage.      
unknown

## 2024-11-19 ENCOUNTER — APPOINTMENT (OUTPATIENT)
Dept: GYNECOLOGIC ONCOLOGY | Facility: CLINIC | Age: 59
End: 2024-11-19

## 2025-02-18 NOTE — ED ADULT NURSE NOTE - BREATH SOUNDS, LEFT
ELA Sanchez states meds are okay to give together, stating this is how pt takes them at home.
Ortho team rounded in AM and changed dressing. pt care ongoing, no further orders.
clear

## 2025-02-21 NOTE — H&P PST ADULT - TEACHING/LEARNING FACTORS INFLUENCE READINESS TO LEARN
Render Risk Assessment In Note?: no Detail Level: Simple Comment: Nose remains clear from 5FU treatment Comment: Over lap with stasis none Comment: Sunita ln2 x3

## 2025-07-25 ENCOUNTER — APPOINTMENT (OUTPATIENT)
Dept: NEPHROLOGY | Facility: CLINIC | Age: 60
End: 2025-07-25

## (undated) DEVICE — CABLE DAC ACTIVE CORD

## (undated) DEVICE — DRSG TELFA 3 X 8

## (undated) DEVICE — TUBING SUCTION 20FT

## (undated) DEVICE — SOL IRR POUR NS 0.9% 500ML

## (undated) DEVICE — DRAPE MAYO STAND 23"

## (undated) DEVICE — GLV 6 PROTEXIS (WHITE)

## (undated) DEVICE — PACK MAJOR CHEST BREAST

## (undated) DEVICE — GLV 6.5 PROTEXIS (BLUE)

## (undated) DEVICE — GOWN LG

## (undated) DEVICE — DRAPE 3/4 SHEET 52X76"

## (undated) DEVICE — ELCTR CUTTING LOOP 24FR 12 DEG 0.35 WIRE

## (undated) DEVICE — WARMING BLANKET UPPER ADULT

## (undated) DEVICE — SPECIMEN CONTAINER 100ML

## (undated) DEVICE — DRAPE TOWEL BLUE 17" X 24"

## (undated) DEVICE — STAPLER SKIN PROXIMATE ROTATING WIDE

## (undated) DEVICE — CANISTER SUCTION 2000CC

## (undated) DEVICE — PREP BETADINE KIT

## (undated) DEVICE — FOLEY TRAY 16FR LF URINE METER SURESTEP

## (undated) DEVICE — DRAPE UNDER BUTTOCKS W SCREEN

## (undated) DEVICE — DRAPE LAVH 124" X 30" X125"

## (undated) DEVICE — ELCTR CUTTING LOOP 24FR RIGHT ANGLE

## (undated) DEVICE — SOL IRR POUR H2O 250ML

## (undated) DEVICE — LAP PAD 18 X 18"

## (undated) DEVICE — POSITIONER FOAM EGG CRATE ULNAR 2PCS (PINK)

## (undated) DEVICE — DRAPE 1/2 SHEET 40X57"

## (undated) DEVICE — VENODYNE/SCD SLEEVE CALF MEDIUM

## (undated) DEVICE — PACK D&C

## (undated) DEVICE — SOL ANTI FOG

## (undated) DEVICE — DRAPE INSTRUMENT POUCH 6.75" X 11"